# Patient Record
Sex: MALE | Race: BLACK OR AFRICAN AMERICAN | Employment: OTHER | URBAN - METROPOLITAN AREA
[De-identification: names, ages, dates, MRNs, and addresses within clinical notes are randomized per-mention and may not be internally consistent; named-entity substitution may affect disease eponyms.]

---

## 2017-01-01 ENCOUNTER — HOSPITAL ENCOUNTER (OUTPATIENT)
Dept: INFUSION THERAPY | Age: 77
Discharge: HOME | End: 2017-09-08
Payer: MEDICARE

## 2017-01-01 ENCOUNTER — HOSPITAL ENCOUNTER (OUTPATIENT)
Dept: CT IMAGING | Age: 77
Discharge: HOME | End: 2017-07-31
Attending: INTERNAL MEDICINE
Payer: MEDICARE

## 2017-01-01 ENCOUNTER — HOSPITAL ENCOUNTER (OUTPATIENT)
Dept: INFUSION THERAPY | Age: 77
Discharge: HOME | End: 2017-06-16
Payer: MEDICARE

## 2017-01-01 ENCOUNTER — HOSPITAL ENCOUNTER (OUTPATIENT)
Dept: INFUSION THERAPY | Age: 77
Discharge: HOME | End: 2017-10-06
Payer: MEDICARE

## 2017-01-01 ENCOUNTER — TELEPHONE (OUTPATIENT)
Dept: CARDIOLOGY CLINIC | Age: 77
End: 2017-01-01

## 2017-01-01 ENCOUNTER — HOSPITAL ENCOUNTER (OUTPATIENT)
Dept: INFUSION THERAPY | Age: 77
Discharge: HOME | End: 2017-09-01
Payer: MEDICARE

## 2017-01-01 ENCOUNTER — CLINICAL SUPPORT (OUTPATIENT)
Dept: CARDIOLOGY CLINIC | Age: 77
End: 2017-01-01

## 2017-01-01 ENCOUNTER — HOSPITAL ENCOUNTER (OUTPATIENT)
Dept: INFUSION THERAPY | Age: 77
Discharge: HOME | End: 2017-08-25
Payer: MEDICARE

## 2017-01-01 ENCOUNTER — HOSPITAL ENCOUNTER (EMERGENCY)
Age: 77
Discharge: HOME | End: 2017-05-22
Attending: EMERGENCY MEDICINE | Admitting: EMERGENCY MEDICINE
Payer: MEDICARE

## 2017-01-01 ENCOUNTER — HOSPITAL ENCOUNTER (OUTPATIENT)
Dept: INFUSION THERAPY | Age: 77
End: 2017-01-01
Payer: MEDICARE

## 2017-01-01 ENCOUNTER — HOSPITAL ENCOUNTER (OUTPATIENT)
Dept: INFUSION THERAPY | Age: 77
Discharge: HOME | End: 2017-08-18
Payer: MEDICARE

## 2017-01-01 ENCOUNTER — HOSPITAL ENCOUNTER (OUTPATIENT)
Dept: INFUSION THERAPY | Age: 77
Discharge: HOME | End: 2017-10-20
Payer: MEDICARE

## 2017-01-01 ENCOUNTER — HOSPITAL ENCOUNTER (OUTPATIENT)
Dept: GENERAL RADIOLOGY | Age: 77
Discharge: HOME | End: 2017-05-17
Payer: MEDICARE

## 2017-01-01 ENCOUNTER — OFFICE VISIT (OUTPATIENT)
Dept: CARDIOLOGY CLINIC | Age: 77
End: 2017-01-01

## 2017-01-01 ENCOUNTER — HOSPITAL ENCOUNTER (OUTPATIENT)
Dept: CT IMAGING | Age: 77
Discharge: HOME | End: 2017-11-01
Attending: INTERNAL MEDICINE
Payer: MEDICARE

## 2017-01-01 ENCOUNTER — APPOINTMENT (OUTPATIENT)
Dept: INFUSION THERAPY | Age: 77
End: 2017-01-01
Payer: MEDICARE

## 2017-01-01 ENCOUNTER — PATIENT OUTREACH (OUTPATIENT)
Dept: CARDIOLOGY CLINIC | Age: 77
End: 2017-01-01

## 2017-01-01 ENCOUNTER — HOSPITAL ENCOUNTER (OUTPATIENT)
Dept: INFUSION THERAPY | Age: 77
Discharge: HOME | End: 2017-07-28
Payer: MEDICARE

## 2017-01-01 ENCOUNTER — HOSPITAL ENCOUNTER (OUTPATIENT)
Dept: INFUSION THERAPY | Age: 77
Discharge: HOME | End: 2017-10-27
Payer: MEDICARE

## 2017-01-01 ENCOUNTER — HOSPITAL ENCOUNTER (OUTPATIENT)
Dept: ULTRASOUND IMAGING | Age: 77
Discharge: HOME | End: 2017-01-04
Attending: INTERNAL MEDICINE

## 2017-01-01 ENCOUNTER — APPOINTMENT (OUTPATIENT)
Dept: GENERAL RADIOLOGY | Age: 77
DRG: 291 | End: 2017-01-01
Attending: EMERGENCY MEDICINE
Payer: MEDICARE

## 2017-01-01 ENCOUNTER — HOSPITAL ENCOUNTER (OUTPATIENT)
Dept: INFUSION THERAPY | Age: 77
Discharge: HOME | End: 2017-08-11
Payer: MEDICARE

## 2017-01-01 ENCOUNTER — APPOINTMENT (OUTPATIENT)
Dept: GENERAL RADIOLOGY | Age: 77
End: 2017-01-01
Attending: EMERGENCY MEDICINE
Payer: MEDICARE

## 2017-01-01 ENCOUNTER — HOSPITAL ENCOUNTER (EMERGENCY)
Age: 77
Discharge: HOME | End: 2017-05-11
Attending: EMERGENCY MEDICINE
Payer: MEDICARE

## 2017-01-01 ENCOUNTER — HOSPITAL ENCOUNTER (OUTPATIENT)
Dept: INFUSION THERAPY | Age: 77
Discharge: HOME | End: 2017-09-29
Payer: MEDICARE

## 2017-01-01 ENCOUNTER — HOSPITAL ENCOUNTER (OUTPATIENT)
Dept: INFUSION THERAPY | Age: 77
Discharge: HOME | End: 2017-06-30
Payer: MEDICARE

## 2017-01-01 ENCOUNTER — APPOINTMENT (OUTPATIENT)
Dept: CT IMAGING | Age: 77
End: 2017-01-01
Attending: EMERGENCY MEDICINE
Payer: MEDICARE

## 2017-01-01 ENCOUNTER — HOSPITAL ENCOUNTER (OUTPATIENT)
Dept: CT IMAGING | Age: 77
Discharge: HOME | End: 2017-01-18
Attending: INTERNAL MEDICINE
Payer: MEDICARE

## 2017-01-01 ENCOUNTER — HOSPITAL ENCOUNTER (OUTPATIENT)
Dept: GENERAL RADIOLOGY | Age: 77
Discharge: HOME | End: 2017-10-27
Payer: MEDICARE

## 2017-01-01 ENCOUNTER — HOSPITAL ENCOUNTER (OUTPATIENT)
Dept: INFUSION THERAPY | Age: 77
Discharge: HOME | End: 2017-11-03
Payer: MEDICARE

## 2017-01-01 ENCOUNTER — HOSPITAL ENCOUNTER (INPATIENT)
Age: 77
LOS: 7 days | Discharge: HOSPICE | DRG: 291 | End: 2017-11-13
Attending: EMERGENCY MEDICINE | Admitting: INTERNAL MEDICINE
Payer: MEDICARE

## 2017-01-01 ENCOUNTER — HOSPITAL ENCOUNTER (OUTPATIENT)
Dept: INFUSION THERAPY | Age: 77
Discharge: HOME | End: 2017-09-15
Payer: MEDICARE

## 2017-01-01 ENCOUNTER — HOSPITAL ENCOUNTER (OUTPATIENT)
Dept: INFUSION THERAPY | Age: 77
Discharge: HOME | End: 2017-10-13
Payer: MEDICARE

## 2017-01-01 ENCOUNTER — ANESTHESIA (OUTPATIENT)
Dept: SURGERY | Age: 77
End: 2017-01-01
Payer: MEDICARE

## 2017-01-01 ENCOUNTER — HOSPITAL ENCOUNTER (OUTPATIENT)
Age: 77
Setting detail: OUTPATIENT SURGERY
Discharge: HOME | End: 2017-08-28
Attending: INTERNAL MEDICINE | Admitting: INTERNAL MEDICINE
Payer: MEDICARE

## 2017-01-01 ENCOUNTER — HOSPITAL ENCOUNTER (OUTPATIENT)
Dept: INFUSION THERAPY | Age: 77
Discharge: HOME | End: 2017-09-22
Payer: MEDICARE

## 2017-01-01 ENCOUNTER — HOSPITAL ENCOUNTER (EMERGENCY)
Age: 77
Discharge: HOME | End: 2017-03-21
Attending: EMERGENCY MEDICINE | Admitting: EMERGENCY MEDICINE
Payer: MEDICARE

## 2017-01-01 ENCOUNTER — HOSPITAL ENCOUNTER (OUTPATIENT)
Dept: INFUSION THERAPY | Age: 77
Discharge: HOME | End: 2017-07-14
Payer: MEDICARE

## 2017-01-01 ENCOUNTER — ANESTHESIA EVENT (OUTPATIENT)
Dept: SURGERY | Age: 77
End: 2017-01-01
Payer: MEDICARE

## 2017-01-01 VITALS
HEIGHT: 69 IN | DIASTOLIC BLOOD PRESSURE: 31 MMHG | TEMPERATURE: 97.8 F | WEIGHT: 208 LBS | OXYGEN SATURATION: 95 % | BODY MASS INDEX: 30.81 KG/M2 | HEART RATE: 71 BPM | RESPIRATION RATE: 16 BRPM | SYSTOLIC BLOOD PRESSURE: 100 MMHG

## 2017-01-01 VITALS
RESPIRATION RATE: 18 BRPM | HEIGHT: 68 IN | BODY MASS INDEX: 33.18 KG/M2 | WEIGHT: 207.6 LBS | SYSTOLIC BLOOD PRESSURE: 106 MMHG | DIASTOLIC BLOOD PRESSURE: 62 MMHG | WEIGHT: 218.9 LBS | HEART RATE: 69 BPM | TEMPERATURE: 97.2 F | RESPIRATION RATE: 18 BRPM | TEMPERATURE: 97 F | SYSTOLIC BLOOD PRESSURE: 110 MMHG | BODY MASS INDEX: 31.57 KG/M2 | OXYGEN SATURATION: 98 % | HEART RATE: 68 BPM | DIASTOLIC BLOOD PRESSURE: 62 MMHG

## 2017-01-01 VITALS
SYSTOLIC BLOOD PRESSURE: 123 MMHG | DIASTOLIC BLOOD PRESSURE: 65 MMHG | TEMPERATURE: 97.8 F | WEIGHT: 208 LBS | HEIGHT: 69 IN | OXYGEN SATURATION: 99 % | HEART RATE: 70 BPM | RESPIRATION RATE: 18 BRPM | BODY MASS INDEX: 30.81 KG/M2

## 2017-01-01 VITALS
DIASTOLIC BLOOD PRESSURE: 64 MMHG | BODY MASS INDEX: 30.81 KG/M2 | RESPIRATION RATE: 16 BRPM | SYSTOLIC BLOOD PRESSURE: 102 MMHG | HEIGHT: 69 IN | HEART RATE: 70 BPM | WEIGHT: 208 LBS | OXYGEN SATURATION: 98 %

## 2017-01-01 VITALS
SYSTOLIC BLOOD PRESSURE: 85 MMHG | HEART RATE: 71 BPM | OXYGEN SATURATION: 98 % | DIASTOLIC BLOOD PRESSURE: 52 MMHG | TEMPERATURE: 97.6 F | RESPIRATION RATE: 18 BRPM

## 2017-01-01 VITALS
WEIGHT: 223.5 LBS | SYSTOLIC BLOOD PRESSURE: 116 MMHG | RESPIRATION RATE: 16 BRPM | HEIGHT: 68 IN | OXYGEN SATURATION: 96 % | BODY MASS INDEX: 33.87 KG/M2 | DIASTOLIC BLOOD PRESSURE: 60 MMHG | HEART RATE: 70 BPM

## 2017-01-01 VITALS
HEART RATE: 68 BPM | DIASTOLIC BLOOD PRESSURE: 57 MMHG | SYSTOLIC BLOOD PRESSURE: 107 MMHG | TEMPERATURE: 97.5 F | RESPIRATION RATE: 18 BRPM

## 2017-01-01 VITALS
WEIGHT: 207.4 LBS | TEMPERATURE: 97.5 F | DIASTOLIC BLOOD PRESSURE: 60 MMHG | HEART RATE: 71 BPM | BODY MASS INDEX: 31.43 KG/M2 | SYSTOLIC BLOOD PRESSURE: 109 MMHG | HEIGHT: 68 IN | RESPIRATION RATE: 18 BRPM | OXYGEN SATURATION: 100 %

## 2017-01-01 VITALS
HEART RATE: 70 BPM | SYSTOLIC BLOOD PRESSURE: 111 MMHG | RESPIRATION RATE: 18 BRPM | DIASTOLIC BLOOD PRESSURE: 66 MMHG | TEMPERATURE: 97.9 F

## 2017-01-01 VITALS
TEMPERATURE: 97.6 F | OXYGEN SATURATION: 99 % | RESPIRATION RATE: 22 BRPM | HEART RATE: 70 BPM | DIASTOLIC BLOOD PRESSURE: 60 MMHG | SYSTOLIC BLOOD PRESSURE: 100 MMHG

## 2017-01-01 VITALS
DIASTOLIC BLOOD PRESSURE: 62 MMHG | OXYGEN SATURATION: 100 % | HEART RATE: 70 BPM | SYSTOLIC BLOOD PRESSURE: 119 MMHG | RESPIRATION RATE: 18 BRPM | TEMPERATURE: 98.1 F

## 2017-01-01 VITALS
SYSTOLIC BLOOD PRESSURE: 103 MMHG | HEIGHT: 68 IN | WEIGHT: 222.06 LBS | DIASTOLIC BLOOD PRESSURE: 61 MMHG | OXYGEN SATURATION: 99 % | TEMPERATURE: 97.5 F | BODY MASS INDEX: 33.65 KG/M2 | HEART RATE: 70 BPM | RESPIRATION RATE: 18 BRPM

## 2017-01-01 VITALS
DIASTOLIC BLOOD PRESSURE: 54 MMHG | RESPIRATION RATE: 18 BRPM | HEART RATE: 69 BPM | OXYGEN SATURATION: 99 % | SYSTOLIC BLOOD PRESSURE: 90 MMHG | TEMPERATURE: 97.2 F

## 2017-01-01 VITALS
OXYGEN SATURATION: 98 % | SYSTOLIC BLOOD PRESSURE: 107 MMHG | HEART RATE: 70 BPM | RESPIRATION RATE: 18 BRPM | DIASTOLIC BLOOD PRESSURE: 61 MMHG | TEMPERATURE: 97.5 F | WEIGHT: 204.56 LBS | BODY MASS INDEX: 31 KG/M2 | HEIGHT: 68 IN

## 2017-01-01 VITALS
WEIGHT: 207 LBS | HEART RATE: 70 BPM | RESPIRATION RATE: 19 BRPM | HEIGHT: 69 IN | BODY MASS INDEX: 30.66 KG/M2 | DIASTOLIC BLOOD PRESSURE: 68 MMHG | OXYGEN SATURATION: 98 % | SYSTOLIC BLOOD PRESSURE: 108 MMHG

## 2017-01-01 VITALS
DIASTOLIC BLOOD PRESSURE: 74 MMHG | BODY MASS INDEX: 30.51 KG/M2 | RESPIRATION RATE: 16 BRPM | WEIGHT: 206 LBS | HEIGHT: 69 IN | SYSTOLIC BLOOD PRESSURE: 126 MMHG | HEART RATE: 73 BPM | OXYGEN SATURATION: 100 %

## 2017-01-01 VITALS
OXYGEN SATURATION: 99 % | BODY MASS INDEX: 31.64 KG/M2 | TEMPERATURE: 98 F | DIASTOLIC BLOOD PRESSURE: 53 MMHG | RESPIRATION RATE: 18 BRPM | HEART RATE: 70 BPM | SYSTOLIC BLOOD PRESSURE: 104 MMHG | HEIGHT: 69 IN | WEIGHT: 213.63 LBS

## 2017-01-01 VITALS
BODY MASS INDEX: 34.55 KG/M2 | SYSTOLIC BLOOD PRESSURE: 111 MMHG | OXYGEN SATURATION: 97 % | RESPIRATION RATE: 20 BRPM | HEART RATE: 78 BPM | WEIGHT: 227.2 LBS | DIASTOLIC BLOOD PRESSURE: 63 MMHG | TEMPERATURE: 97.5 F

## 2017-01-01 VITALS — BODY MASS INDEX: 33.82 KG/M2 | WEIGHT: 229 LBS

## 2017-01-01 VITALS
DIASTOLIC BLOOD PRESSURE: 64 MMHG | OXYGEN SATURATION: 99 % | BODY MASS INDEX: 31.77 KG/M2 | TEMPERATURE: 98.3 F | HEART RATE: 70 BPM | RESPIRATION RATE: 18 BRPM | SYSTOLIC BLOOD PRESSURE: 109 MMHG | WEIGHT: 209.6 LBS | HEIGHT: 68 IN

## 2017-01-01 VITALS
OXYGEN SATURATION: 97 % | HEART RATE: 70 BPM | SYSTOLIC BLOOD PRESSURE: 112 MMHG | RESPIRATION RATE: 20 BRPM | DIASTOLIC BLOOD PRESSURE: 62 MMHG | TEMPERATURE: 97.4 F

## 2017-01-01 VITALS
WEIGHT: 205.3 LBS | RESPIRATION RATE: 20 BRPM | BODY MASS INDEX: 30.41 KG/M2 | HEIGHT: 69 IN | HEART RATE: 70 BPM | SYSTOLIC BLOOD PRESSURE: 110 MMHG | OXYGEN SATURATION: 97 % | DIASTOLIC BLOOD PRESSURE: 58 MMHG

## 2017-01-01 VITALS
HEIGHT: 68 IN | WEIGHT: 229.28 LBS | BODY MASS INDEX: 34.75 KG/M2 | DIASTOLIC BLOOD PRESSURE: 80 MMHG | SYSTOLIC BLOOD PRESSURE: 129 MMHG | HEART RATE: 70 BPM | RESPIRATION RATE: 19 BRPM | OXYGEN SATURATION: 97 %

## 2017-01-01 VITALS
OXYGEN SATURATION: 96 % | DIASTOLIC BLOOD PRESSURE: 58 MMHG | SYSTOLIC BLOOD PRESSURE: 116 MMHG | TEMPERATURE: 96.9 F | HEART RATE: 71 BPM | RESPIRATION RATE: 20 BRPM

## 2017-01-01 VITALS
RESPIRATION RATE: 16 BRPM | DIASTOLIC BLOOD PRESSURE: 60 MMHG | OXYGEN SATURATION: 98 % | HEIGHT: 68 IN | HEART RATE: 71 BPM | SYSTOLIC BLOOD PRESSURE: 112 MMHG | BODY MASS INDEX: 31.49 KG/M2 | WEIGHT: 207.8 LBS

## 2017-01-01 VITALS
DIASTOLIC BLOOD PRESSURE: 62 MMHG | SYSTOLIC BLOOD PRESSURE: 104 MMHG | TEMPERATURE: 97.8 F | HEART RATE: 70 BPM | RESPIRATION RATE: 18 BRPM

## 2017-01-01 VITALS
DIASTOLIC BLOOD PRESSURE: 58 MMHG | RESPIRATION RATE: 18 BRPM | TEMPERATURE: 97.7 F | OXYGEN SATURATION: 97 % | HEART RATE: 70 BPM | SYSTOLIC BLOOD PRESSURE: 95 MMHG

## 2017-01-01 DIAGNOSIS — I48.92 ATRIAL FLUTTER, UNSPECIFIED TYPE (HCC): ICD-10-CM

## 2017-01-01 DIAGNOSIS — I50.22 CHRONIC SYSTOLIC HEART FAILURE (HCC): Primary | ICD-10-CM

## 2017-01-01 DIAGNOSIS — R19.04 ABDOMINAL SWELLING, LEFT LOWER QUADRANT: ICD-10-CM

## 2017-01-01 DIAGNOSIS — I50.22 CHRONIC SYSTOLIC HEART FAILURE (HCC): ICD-10-CM

## 2017-01-01 DIAGNOSIS — Z95.1 POSTSURGICAL AORTOCORONARY BYPASS STATUS: ICD-10-CM

## 2017-01-01 DIAGNOSIS — C79.52 SECONDARY MALIGNANT NEOPLASM OF BONE AND BONE MARROW (HCC): ICD-10-CM

## 2017-01-01 DIAGNOSIS — R19.04 ABDOMINAL OR PELVIC SWELLING, MASS, OR LUMP, LEFT LOWER QUADRANT: ICD-10-CM

## 2017-01-01 DIAGNOSIS — Z95.810 PRESENCE OF BIVENTRICULAR AUTOMATIC CARDIOVERTER/DEFIBRILLATOR (AICD): Primary | ICD-10-CM

## 2017-01-01 DIAGNOSIS — E16.2 HYPOGLYCEMIA: Primary | ICD-10-CM

## 2017-01-01 DIAGNOSIS — R53.81 DEBILITY: ICD-10-CM

## 2017-01-01 DIAGNOSIS — E11.8 TYPE 2 DIABETES MELLITUS WITH COMPLICATION, UNSPECIFIED LONG TERM INSULIN USE STATUS: ICD-10-CM

## 2017-01-01 DIAGNOSIS — R22.2 SWELLING, MASS, OR LUMP IN CHEST: ICD-10-CM

## 2017-01-01 DIAGNOSIS — E86.0 DEHYDRATION: ICD-10-CM

## 2017-01-01 DIAGNOSIS — R06.09 DOE (DYSPNEA ON EXERTION): ICD-10-CM

## 2017-01-01 DIAGNOSIS — C79.9 METASTATIC CANCER (HCC): ICD-10-CM

## 2017-01-01 DIAGNOSIS — C77.5 SECONDARY AND UNSPECIFIED MALIGNANT NEOPLASM OF INTRAPELVIC LYMPH NODES (HCC): ICD-10-CM

## 2017-01-01 DIAGNOSIS — I10 ESSENTIAL HYPERTENSION, BENIGN: ICD-10-CM

## 2017-01-01 DIAGNOSIS — C79.51 SECONDARY MALIGNANT NEOPLASM OF BONE AND BONE MARROW (HCC): ICD-10-CM

## 2017-01-01 DIAGNOSIS — R22.2 MASS IN CHEST: ICD-10-CM

## 2017-01-01 DIAGNOSIS — C65.9 MALIGNANT NEOPLASM OF RENAL PELVIS (HCC): ICD-10-CM

## 2017-01-01 DIAGNOSIS — R06.02 SHORTNESS OF BREATH: ICD-10-CM

## 2017-01-01 DIAGNOSIS — E78.2 MIXED HYPERLIPIDEMIA: ICD-10-CM

## 2017-01-01 DIAGNOSIS — Z98.890 S/P ABLATION OF ATRIAL FIBRILLATION: ICD-10-CM

## 2017-01-01 DIAGNOSIS — Z95.810 PRESENCE OF BIVENTRICULAR AUTOMATIC CARDIOVERTER/DEFIBRILLATOR (AICD): ICD-10-CM

## 2017-01-01 DIAGNOSIS — Z71.89 COUNSELING REGARDING ADVANCED CARE PLANNING AND GOALS OF CARE: ICD-10-CM

## 2017-01-01 DIAGNOSIS — R06.02 SOB (SHORTNESS OF BREATH): ICD-10-CM

## 2017-01-01 DIAGNOSIS — Z86.79 S/P ABLATION OF ATRIAL FIBRILLATION: ICD-10-CM

## 2017-01-01 DIAGNOSIS — I50.22 CHRONIC SYSTOLIC HEART FAILURE (HCC): Primary | Chronic | ICD-10-CM

## 2017-01-01 DIAGNOSIS — I48.0 PAROXYSMAL ATRIAL FIBRILLATION (HCC): ICD-10-CM

## 2017-01-01 DIAGNOSIS — E87.70 HYPERVOLEMIA, UNSPECIFIED HYPERVOLEMIA TYPE: ICD-10-CM

## 2017-01-01 DIAGNOSIS — I50.22 CHRONIC SYSTOLIC HEART FAILURE (HCC): Chronic | ICD-10-CM

## 2017-01-01 DIAGNOSIS — R06.02 SHORTNESS OF BREATH: Primary | ICD-10-CM

## 2017-01-01 DIAGNOSIS — D50.9 NORMOCYTIC HYPOCHROMIC ANEMIA: ICD-10-CM

## 2017-01-01 DIAGNOSIS — N18.4 CHRONIC KIDNEY DISEASE, STAGE IV (SEVERE) (HCC): ICD-10-CM

## 2017-01-01 DIAGNOSIS — D63.0 ANEMIA IN NEOPLASTIC DISEASE: ICD-10-CM

## 2017-01-01 DIAGNOSIS — I48.91 ATRIAL FIBRILLATION, UNSPECIFIED TYPE (HCC): ICD-10-CM

## 2017-01-01 DIAGNOSIS — I48.3 TYPICAL ATRIAL FLUTTER (HCC): ICD-10-CM

## 2017-01-01 DIAGNOSIS — I25.10 ATHEROSCLEROSIS OF NATIVE CORONARY ARTERY OF NATIVE HEART WITHOUT ANGINA PECTORIS: ICD-10-CM

## 2017-01-01 DIAGNOSIS — E88.09 HYPOALBUMINEMIA: ICD-10-CM

## 2017-01-01 DIAGNOSIS — R05.9 COUGH: ICD-10-CM

## 2017-01-01 LAB
ALBUMIN SERPL BCP-MCNC: 3 G/DL (ref 3.5–5)
ALBUMIN SERPL BCP-MCNC: 3.1 G/DL (ref 3.5–5)
ALBUMIN SERPL BCP-MCNC: 3.2 G/DL (ref 3.5–5)
ALBUMIN SERPL BCP-MCNC: 3.2 G/DL (ref 3.5–5)
ALBUMIN SERPL BCP-MCNC: 3.3 G/DL (ref 3.5–5)
ALBUMIN SERPL-MCNC: 1.8 G/DL (ref 3.5–5)
ALBUMIN SERPL-MCNC: 2.2 G/DL (ref 3.5–5)
ALBUMIN SERPL-MCNC: 2.3 G/DL (ref 3.5–5)
ALBUMIN SERPL-MCNC: 2.6 G/DL (ref 3.5–5)
ALBUMIN SERPL-MCNC: 2.8 G/DL (ref 3.5–5)
ALBUMIN SERPL-MCNC: 3 G/DL (ref 3.5–5)
ALBUMIN SERPL-MCNC: 3.5 G/DL (ref 3.5–4.8)
ALBUMIN/GLOB SERPL: 0.5 {RATIO} (ref 1.1–2.2)
ALBUMIN/GLOB SERPL: 0.5 {RATIO} (ref 1.1–2.2)
ALBUMIN/GLOB SERPL: 0.6 {RATIO} (ref 1.1–2.2)
ALBUMIN/GLOB SERPL: 0.7 {RATIO} (ref 1.1–2.2)
ALBUMIN/GLOB SERPL: 0.8 {RATIO} (ref 1.1–2.2)
ALBUMIN/GLOB SERPL: 1.1 {RATIO} (ref 1.2–2.2)
ALP SERPL-CCNC: 153 U/L (ref 45–117)
ALP SERPL-CCNC: 175 U/L (ref 45–117)
ALP SERPL-CCNC: 178 IU/L (ref 39–117)
ALP SERPL-CCNC: 182 U/L (ref 45–117)
ALP SERPL-CCNC: 188 U/L (ref 45–117)
ALP SERPL-CCNC: 191 U/L (ref 45–117)
ALP SERPL-CCNC: 195 U/L (ref 45–117)
ALP SERPL-CCNC: 210 U/L (ref 45–117)
ALP SERPL-CCNC: 224 U/L (ref 45–117)
ALP SERPL-CCNC: 243 U/L (ref 45–117)
ALP SERPL-CCNC: 261 U/L (ref 45–117)
ALP SERPL-CCNC: 292 U/L (ref 45–117)
ALP SERPL-CCNC: 304 U/L (ref 45–117)
ALP SERPL-CCNC: 372 U/L (ref 45–117)
ALT SERPL-CCNC: 10 U/L (ref 12–78)
ALT SERPL-CCNC: 11 IU/L (ref 0–44)
ALT SERPL-CCNC: 11 U/L (ref 12–78)
ALT SERPL-CCNC: 12 U/L (ref 12–78)
ALT SERPL-CCNC: 13 U/L (ref 12–78)
ALT SERPL-CCNC: 14 U/L (ref 12–78)
ALT SERPL-CCNC: 14 U/L (ref 12–78)
ALT SERPL-CCNC: 16 U/L (ref 12–78)
ALT SERPL-CCNC: 19 U/L (ref 12–78)
ALT SERPL-CCNC: 28 U/L (ref 12–78)
ALT SERPL-CCNC: 9 U/L (ref 12–78)
ALT SERPL-CCNC: 9 U/L (ref 12–78)
ANION GAP BLD CALC-SCNC: 7 MMOL/L (ref 5–15)
ANION GAP BLD CALC-SCNC: 8 MMOL/L (ref 5–15)
ANION GAP BLD CALC-SCNC: 9 MMOL/L (ref 5–15)
ANION GAP SERPL CALC-SCNC: 10 MMOL/L (ref 5–15)
ANION GAP SERPL CALC-SCNC: 11 MMOL/L (ref 5–15)
ANION GAP SERPL CALC-SCNC: 6 MMOL/L (ref 5–15)
ANION GAP SERPL CALC-SCNC: 7 MMOL/L (ref 5–15)
ANION GAP SERPL CALC-SCNC: 8 MMOL/L (ref 5–15)
ANION GAP SERPL CALC-SCNC: 9 MMOL/L (ref 5–15)
APPEARANCE UR: ABNORMAL
APPEARANCE UR: CLEAR
AST SERPL W P-5'-P-CCNC: 11 U/L (ref 15–37)
AST SERPL W P-5'-P-CCNC: 14 U/L (ref 15–37)
AST SERPL W P-5'-P-CCNC: 15 U/L (ref 15–37)
AST SERPL W P-5'-P-CCNC: 15 U/L (ref 15–37)
AST SERPL W P-5'-P-CCNC: 16 U/L (ref 15–37)
AST SERPL W P-5'-P-CCNC: 16 U/L (ref 15–37)
AST SERPL W P-5'-P-CCNC: 20 U/L (ref 15–37)
AST SERPL-CCNC: 10 U/L (ref 15–37)
AST SERPL-CCNC: 11 U/L (ref 15–37)
AST SERPL-CCNC: 15 IU/L (ref 0–40)
AST SERPL-CCNC: 20 U/L (ref 15–37)
AST SERPL-CCNC: 23 U/L (ref 15–37)
ATRIAL RATE: 59 BPM
ATRIAL RATE: 90 BPM
BACTERIA URNS QL MICRO: NEGATIVE /HPF
BACTERIA URNS QL MICRO: NEGATIVE /HPF
BASO+EOS+MONOS # BLD AUTO: 0.9 K/UL (ref 0.2–1.2)
BASO+EOS+MONOS # BLD AUTO: 1.1 K/UL (ref 0.2–1.2)
BASO+EOS+MONOS # BLD AUTO: 1.1 K/UL (ref 0.2–1.2)
BASO+EOS+MONOS # BLD AUTO: 1.3 K/UL (ref 0.2–1.2)
BASO+EOS+MONOS # BLD AUTO: 15 % (ref 3.2–16.9)
BASO+EOS+MONOS # BLD AUTO: 5 % (ref 3.2–16.9)
BASO+EOS+MONOS # BLD AUTO: 5 % (ref 3.2–16.9)
BASO+EOS+MONOS # BLD AUTO: 9 % (ref 3.2–16.9)
BASOPHILS # BLD AUTO: 0 K/UL (ref 0–0.1)
BASOPHILS # BLD: 0 % (ref 0–1)
BASOPHILS # BLD: 0 K/UL (ref 0–0.1)
BASOPHILS NFR BLD: 0 % (ref 0–1)
BILIRUB SERPL-MCNC: 1.4 MG/DL (ref 0.2–1)
BILIRUB SERPL-MCNC: 1.5 MG/DL (ref 0.2–1)
BILIRUB SERPL-MCNC: 1.6 MG/DL (ref 0–1.2)
BILIRUB SERPL-MCNC: 1.7 MG/DL (ref 0.2–1)
BILIRUB SERPL-MCNC: 1.7 MG/DL (ref 0.2–1)
BILIRUB SERPL-MCNC: 1.8 MG/DL (ref 0.2–1)
BILIRUB SERPL-MCNC: 1.9 MG/DL (ref 0.2–1)
BILIRUB SERPL-MCNC: 1.9 MG/DL (ref 0.2–1)
BILIRUB SERPL-MCNC: 2.1 MG/DL (ref 0.2–1)
BILIRUB SERPL-MCNC: 2.1 MG/DL (ref 0.2–1)
BILIRUB SERPL-MCNC: 2.2 MG/DL (ref 0.2–1)
BILIRUB SERPL-MCNC: 2.4 MG/DL (ref 0.2–1)
BILIRUB SERPL-MCNC: 2.6 MG/DL (ref 0.2–1)
BILIRUB SERPL-MCNC: 2.7 MG/DL (ref 0.2–1)
BILIRUB UR QL CFM: NEGATIVE
BILIRUB UR QL: NEGATIVE
BNP SERPL-MCNC: 6973 PG/ML (ref 0–125)
BNP SERPL-MCNC: ABNORMAL PG/ML (ref 0–450)
BUN SERPL-MCNC: 15 MG/DL (ref 6–20)
BUN SERPL-MCNC: 18 MG/DL (ref 6–20)
BUN SERPL-MCNC: 18 MG/DL (ref 8–27)
BUN SERPL-MCNC: 21 MG/DL (ref 6–20)
BUN SERPL-MCNC: 23 MG/DL (ref 6–20)
BUN SERPL-MCNC: 25 MG/DL (ref 6–20)
BUN SERPL-MCNC: 28 MG/DL (ref 6–20)
BUN SERPL-MCNC: 30 MG/DL (ref 8–27)
BUN SERPL-MCNC: 33 MG/DL (ref 6–20)
BUN SERPL-MCNC: 33 MG/DL (ref 6–20)
BUN SERPL-MCNC: 34 MG/DL (ref 6–20)
BUN SERPL-MCNC: 38 MG/DL (ref 6–20)
BUN SERPL-MCNC: 44 MG/DL (ref 6–20)
BUN SERPL-MCNC: 44 MG/DL (ref 6–20)
BUN SERPL-MCNC: 47 MG/DL (ref 6–20)
BUN SERPL-MCNC: 49 MG/DL (ref 6–20)
BUN SERPL-MCNC: 49 MG/DL (ref 6–20)
BUN SERPL-MCNC: 52 MG/DL (ref 6–20)
BUN SERPL-MCNC: 54 MG/DL (ref 6–20)
BUN SERPL-MCNC: 54 MG/DL (ref 6–20)
BUN SERPL-MCNC: 56 MG/DL (ref 6–20)
BUN/CREAT SERPL: 10 (ref 10–24)
BUN/CREAT SERPL: 10 (ref 12–20)
BUN/CREAT SERPL: 10 (ref 12–20)
BUN/CREAT SERPL: 12 (ref 12–20)
BUN/CREAT SERPL: 13 (ref 12–20)
BUN/CREAT SERPL: 14 (ref 12–20)
BUN/CREAT SERPL: 15 (ref 12–20)
BUN/CREAT SERPL: 16 (ref 10–24)
BUN/CREAT SERPL: 19 (ref 12–20)
BUN/CREAT SERPL: 23 (ref 12–20)
BUN/CREAT SERPL: 25 (ref 12–20)
BUN/CREAT SERPL: 25 (ref 12–20)
BUN/CREAT SERPL: 26 (ref 12–20)
BUN/CREAT SERPL: 27 (ref 12–20)
BUN/CREAT SERPL: 9 (ref 12–20)
CALCIUM SERPL-MCNC: 8 MG/DL (ref 8.5–10.1)
CALCIUM SERPL-MCNC: 8.2 MG/DL (ref 8.5–10.1)
CALCIUM SERPL-MCNC: 8.3 MG/DL (ref 8.5–10.1)
CALCIUM SERPL-MCNC: 8.4 MG/DL (ref 8.5–10.1)
CALCIUM SERPL-MCNC: 8.4 MG/DL (ref 8.5–10.1)
CALCIUM SERPL-MCNC: 8.5 MG/DL (ref 8.5–10.1)
CALCIUM SERPL-MCNC: 8.5 MG/DL (ref 8.5–10.1)
CALCIUM SERPL-MCNC: 8.6 MG/DL (ref 8.5–10.1)
CALCIUM SERPL-MCNC: 8.7 MG/DL (ref 8.5–10.1)
CALCIUM SERPL-MCNC: 8.7 MG/DL (ref 8.5–10.1)
CALCIUM SERPL-MCNC: 8.8 MG/DL (ref 8.5–10.1)
CALCIUM SERPL-MCNC: 8.9 MG/DL (ref 8.5–10.1)
CALCIUM SERPL-MCNC: 9 MG/DL (ref 8.5–10.1)
CALCIUM SERPL-MCNC: 9.2 MG/DL (ref 8.5–10.1)
CALCIUM SERPL-MCNC: 9.4 MG/DL (ref 8.5–10.1)
CALCIUM SERPL-MCNC: 9.4 MG/DL (ref 8.6–10.2)
CALCIUM SERPL-MCNC: 9.6 MG/DL (ref 8.6–10.2)
CALCULATED R AXIS, ECG10: -110 DEGREES
CALCULATED R AXIS, ECG10: -24 DEGREES
CALCULATED T AXIS, ECG11: 60 DEGREES
CALCULATED T AXIS, ECG11: 63 DEGREES
CANCER AG19-9 SERPL-ACNC: 1436 U/ML (ref 0–35)
CHLORIDE SERPL-SCNC: 100 MMOL/L (ref 97–108)
CHLORIDE SERPL-SCNC: 92 MMOL/L (ref 96–106)
CHLORIDE SERPL-SCNC: 93 MMOL/L (ref 97–108)
CHLORIDE SERPL-SCNC: 94 MMOL/L (ref 96–106)
CHLORIDE SERPL-SCNC: 94 MMOL/L (ref 97–108)
CHLORIDE SERPL-SCNC: 95 MMOL/L (ref 97–108)
CHLORIDE SERPL-SCNC: 96 MMOL/L (ref 97–108)
CHLORIDE SERPL-SCNC: 96 MMOL/L (ref 97–108)
CHLORIDE SERPL-SCNC: 97 MMOL/L (ref 97–108)
CHLORIDE SERPL-SCNC: 98 MMOL/L (ref 97–108)
CHLORIDE SERPL-SCNC: 99 MMOL/L (ref 97–108)
CHOLEST SERPL-MCNC: 74 MG/DL
CK MB CFR SERPL CALC: NORMAL % (ref 0–2.5)
CK MB SERPL-MCNC: <1 NG/ML (ref 5–25)
CK SERPL-CCNC: 45 U/L (ref 39–308)
CO2 SERPL-SCNC: 23 MMOL/L (ref 21–32)
CO2 SERPL-SCNC: 24 MMOL/L (ref 18–29)
CO2 SERPL-SCNC: 24 MMOL/L (ref 21–32)
CO2 SERPL-SCNC: 24 MMOL/L (ref 21–32)
CO2 SERPL-SCNC: 26 MMOL/L (ref 21–32)
CO2 SERPL-SCNC: 27 MMOL/L (ref 21–32)
CO2 SERPL-SCNC: 28 MMOL/L (ref 18–29)
CO2 SERPL-SCNC: 28 MMOL/L (ref 21–32)
CO2 SERPL-SCNC: 29 MMOL/L (ref 21–32)
CO2 SERPL-SCNC: 31 MMOL/L (ref 21–32)
CO2 SERPL-SCNC: 31 MMOL/L (ref 21–32)
CO2 SERPL-SCNC: 34 MMOL/L (ref 21–32)
COLOR UR: ABNORMAL
COLOR UR: ABNORMAL
CREAT SERPL-MCNC: 1.64 MG/DL (ref 0.7–1.3)
CREAT SERPL-MCNC: 1.65 MG/DL (ref 0.7–1.3)
CREAT SERPL-MCNC: 1.73 MG/DL (ref 0.7–1.3)
CREAT SERPL-MCNC: 1.8 MG/DL (ref 0.7–1.3)
CREAT SERPL-MCNC: 1.81 MG/DL (ref 0.7–1.3)
CREAT SERPL-MCNC: 1.85 MG/DL (ref 0.76–1.27)
CREAT SERPL-MCNC: 1.88 MG/DL (ref 0.7–1.3)
CREAT SERPL-MCNC: 1.9 MG/DL (ref 0.7–1.3)
CREAT SERPL-MCNC: 1.91 MG/DL (ref 0.76–1.27)
CREAT SERPL-MCNC: 2.02 MG/DL (ref 0.7–1.3)
CREAT SERPL-MCNC: 2.06 MG/DL (ref 0.7–1.3)
CREAT SERPL-MCNC: 2.11 MG/DL (ref 0.7–1.3)
CREAT SERPL-MCNC: 2.22 MG/DL (ref 0.7–1.3)
CREAT SERPL-MCNC: 2.31 MG/DL (ref 0.7–1.3)
CREAT SERPL-MCNC: 2.34 MG/DL (ref 0.7–1.3)
CREAT SERPL-MCNC: 2.34 MG/DL (ref 0.7–1.3)
CREAT SERPL-MCNC: 2.44 MG/DL (ref 0.7–1.3)
CREAT SERPL-MCNC: 2.55 MG/DL (ref 0.7–1.3)
CREAT SERPL-MCNC: 2.63 MG/DL (ref 0.7–1.3)
CREAT SERPL-MCNC: 3.24 MG/DL (ref 0.7–1.3)
CREAT SERPL-MCNC: 3.43 MG/DL (ref 0.7–1.3)
DIAGNOSIS, 93000: NORMAL
DIAGNOSIS, 93000: NORMAL
DIFFERENTIAL METHOD BLD: ABNORMAL
EOSINOPHIL # BLD: 0 K/UL (ref 0–0.4)
EOSINOPHIL # BLD: 0.1 K/UL (ref 0–0.4)
EOSINOPHIL NFR BLD: 0 % (ref 0–7)
EOSINOPHIL NFR BLD: 1 % (ref 0–7)
EPITH CASTS URNS QL MICRO: ABNORMAL /LPF
EPITH CASTS URNS QL MICRO: ABNORMAL /LPF
ERYTHROCYTE [DISTWIDTH] IN BLOOD BY AUTOMATED COUNT: 15.7 % (ref 11.8–15.8)
ERYTHROCYTE [DISTWIDTH] IN BLOOD BY AUTOMATED COUNT: 16.1 % (ref 11.5–14.5)
ERYTHROCYTE [DISTWIDTH] IN BLOOD BY AUTOMATED COUNT: 16.1 % (ref 11.5–14.5)
ERYTHROCYTE [DISTWIDTH] IN BLOOD BY AUTOMATED COUNT: 16.1 % (ref 11.8–15.8)
ERYTHROCYTE [DISTWIDTH] IN BLOOD BY AUTOMATED COUNT: 16.4 % (ref 11.5–14.5)
ERYTHROCYTE [DISTWIDTH] IN BLOOD BY AUTOMATED COUNT: 16.6 % (ref 11.8–15.8)
ERYTHROCYTE [DISTWIDTH] IN BLOOD BY AUTOMATED COUNT: 16.7 % (ref 11.5–14.5)
ERYTHROCYTE [DISTWIDTH] IN BLOOD BY AUTOMATED COUNT: 16.9 % (ref 11.5–14.5)
ERYTHROCYTE [DISTWIDTH] IN BLOOD BY AUTOMATED COUNT: 17.1 % (ref 11.5–14.5)
ERYTHROCYTE [DISTWIDTH] IN BLOOD BY AUTOMATED COUNT: 17.2 % (ref 11.5–14.5)
ERYTHROCYTE [DISTWIDTH] IN BLOOD BY AUTOMATED COUNT: 17.3 % (ref 11.5–14.5)
ERYTHROCYTE [DISTWIDTH] IN BLOOD BY AUTOMATED COUNT: 17.5 % (ref 11.5–14.5)
ERYTHROCYTE [DISTWIDTH] IN BLOOD BY AUTOMATED COUNT: 17.5 % (ref 11.5–14.5)
ERYTHROCYTE [DISTWIDTH] IN BLOOD BY AUTOMATED COUNT: 17.6 % (ref 11.5–14.5)
ERYTHROCYTE [DISTWIDTH] IN BLOOD BY AUTOMATED COUNT: 17.6 % (ref 11.5–14.5)
ERYTHROCYTE [DISTWIDTH] IN BLOOD BY AUTOMATED COUNT: 17.7 % (ref 11.5–14.5)
ERYTHROCYTE [DISTWIDTH] IN BLOOD BY AUTOMATED COUNT: 17.9 % (ref 11.8–15.8)
ERYTHROCYTE [DISTWIDTH] IN BLOOD BY AUTOMATED COUNT: 18 % (ref 11.5–14.5)
ERYTHROCYTE [DISTWIDTH] IN BLOOD BY AUTOMATED COUNT: 18.2 % (ref 11.5–14.5)
ERYTHROCYTE [DISTWIDTH] IN BLOOD BY AUTOMATED COUNT: 18.3 % (ref 11.5–14.5)
FERRITIN SERPL-MCNC: 173 NG/ML (ref 26–388)
FERRITIN SERPL-MCNC: 92 NG/ML (ref 26–388)
FERRITIN SERPL-MCNC: 98 NG/ML (ref 26–388)
GLOBULIN SER CALC-MCNC: 3.3 G/DL (ref 1.5–4.5)
GLOBULIN SER CALC-MCNC: 3.5 G/DL (ref 2–4)
GLOBULIN SER CALC-MCNC: 3.6 G/DL (ref 2–4)
GLOBULIN SER CALC-MCNC: 3.7 G/DL (ref 2–4)
GLOBULIN SER CALC-MCNC: 3.9 G/DL (ref 2–4)
GLOBULIN SER CALC-MCNC: 4 G/DL (ref 2–4)
GLOBULIN SER CALC-MCNC: 4 G/DL (ref 2–4)
GLOBULIN SER CALC-MCNC: 4.1 G/DL (ref 2–4)
GLOBULIN SER CALC-MCNC: 4.1 G/DL (ref 2–4)
GLOBULIN SER CALC-MCNC: 4.2 G/DL (ref 2–4)
GLOBULIN SER CALC-MCNC: 4.3 G/DL (ref 2–4)
GLOBULIN SER CALC-MCNC: 4.4 G/DL (ref 2–4)
GLOBULIN SER CALC-MCNC: 4.4 G/DL (ref 2–4)
GLOBULIN SER CALC-MCNC: 4.7 G/DL (ref 2–4)
GLUCOSE BLD STRIP.AUTO-MCNC: 102 MG/DL (ref 65–100)
GLUCOSE BLD STRIP.AUTO-MCNC: 108 MG/DL (ref 65–100)
GLUCOSE BLD STRIP.AUTO-MCNC: 109 MG/DL (ref 65–100)
GLUCOSE BLD STRIP.AUTO-MCNC: 113 MG/DL (ref 65–100)
GLUCOSE BLD STRIP.AUTO-MCNC: 114 MG/DL (ref 65–100)
GLUCOSE BLD STRIP.AUTO-MCNC: 114 MG/DL (ref 65–100)
GLUCOSE BLD STRIP.AUTO-MCNC: 119 MG/DL (ref 65–100)
GLUCOSE BLD STRIP.AUTO-MCNC: 121 MG/DL (ref 65–100)
GLUCOSE BLD STRIP.AUTO-MCNC: 124 MG/DL (ref 65–100)
GLUCOSE BLD STRIP.AUTO-MCNC: 128 MG/DL (ref 65–100)
GLUCOSE BLD STRIP.AUTO-MCNC: 131 MG/DL (ref 65–100)
GLUCOSE BLD STRIP.AUTO-MCNC: 131 MG/DL (ref 65–100)
GLUCOSE BLD STRIP.AUTO-MCNC: 142 MG/DL (ref 65–100)
GLUCOSE BLD STRIP.AUTO-MCNC: 143 MG/DL (ref 65–100)
GLUCOSE BLD STRIP.AUTO-MCNC: 146 MG/DL (ref 65–100)
GLUCOSE BLD STRIP.AUTO-MCNC: 146 MG/DL (ref 65–100)
GLUCOSE BLD STRIP.AUTO-MCNC: 159 MG/DL (ref 65–100)
GLUCOSE BLD STRIP.AUTO-MCNC: 161 MG/DL (ref 65–100)
GLUCOSE BLD STRIP.AUTO-MCNC: 175 MG/DL (ref 65–100)
GLUCOSE BLD STRIP.AUTO-MCNC: 187 MG/DL (ref 65–100)
GLUCOSE BLD STRIP.AUTO-MCNC: 194 MG/DL (ref 65–100)
GLUCOSE BLD STRIP.AUTO-MCNC: 204 MG/DL (ref 65–100)
GLUCOSE BLD STRIP.AUTO-MCNC: 249 MG/DL (ref 65–100)
GLUCOSE BLD STRIP.AUTO-MCNC: 420 MG/DL (ref 65–100)
GLUCOSE BLD STRIP.AUTO-MCNC: 427 MG/DL (ref 65–100)
GLUCOSE BLD STRIP.AUTO-MCNC: 63 MG/DL (ref 65–100)
GLUCOSE BLD STRIP.AUTO-MCNC: 66 MG/DL (ref 65–100)
GLUCOSE BLD STRIP.AUTO-MCNC: 70 MG/DL (ref 65–100)
GLUCOSE BLD STRIP.AUTO-MCNC: 78 MG/DL (ref 65–100)
GLUCOSE BLD STRIP.AUTO-MCNC: 81 MG/DL (ref 65–100)
GLUCOSE BLD STRIP.AUTO-MCNC: 82 MG/DL (ref 65–100)
GLUCOSE BLD STRIP.AUTO-MCNC: 85 MG/DL (ref 65–100)
GLUCOSE BLD STRIP.AUTO-MCNC: 86 MG/DL (ref 65–100)
GLUCOSE BLD STRIP.AUTO-MCNC: 87 MG/DL (ref 65–100)
GLUCOSE BLD STRIP.AUTO-MCNC: 89 MG/DL (ref 65–100)
GLUCOSE BLD STRIP.AUTO-MCNC: 93 MG/DL (ref 65–100)
GLUCOSE BLD STRIP.AUTO-MCNC: 95 MG/DL (ref 65–100)
GLUCOSE BLD STRIP.AUTO-MCNC: 96 MG/DL (ref 65–100)
GLUCOSE BLD STRIP.AUTO-MCNC: 99 MG/DL (ref 65–100)
GLUCOSE BLD STRIP.AUTO-MCNC: 99 MG/DL (ref 65–100)
GLUCOSE SERPL-MCNC: 114 MG/DL (ref 65–100)
GLUCOSE SERPL-MCNC: 125 MG/DL (ref 65–100)
GLUCOSE SERPL-MCNC: 140 MG/DL (ref 65–100)
GLUCOSE SERPL-MCNC: 142 MG/DL (ref 65–100)
GLUCOSE SERPL-MCNC: 152 MG/DL (ref 65–100)
GLUCOSE SERPL-MCNC: 156 MG/DL (ref 65–100)
GLUCOSE SERPL-MCNC: 178 MG/DL (ref 65–100)
GLUCOSE SERPL-MCNC: 180 MG/DL (ref 65–100)
GLUCOSE SERPL-MCNC: 182 MG/DL (ref 65–99)
GLUCOSE SERPL-MCNC: 190 MG/DL (ref 65–100)
GLUCOSE SERPL-MCNC: 191 MG/DL (ref 65–100)
GLUCOSE SERPL-MCNC: 191 MG/DL (ref 65–100)
GLUCOSE SERPL-MCNC: 196 MG/DL (ref 65–100)
GLUCOSE SERPL-MCNC: 203 MG/DL (ref 65–99)
GLUCOSE SERPL-MCNC: 240 MG/DL (ref 65–100)
GLUCOSE SERPL-MCNC: 358 MG/DL (ref 65–100)
GLUCOSE SERPL-MCNC: 53 MG/DL (ref 65–100)
GLUCOSE SERPL-MCNC: 68 MG/DL (ref 65–100)
GLUCOSE SERPL-MCNC: 68 MG/DL (ref 65–100)
GLUCOSE SERPL-MCNC: 76 MG/DL (ref 65–100)
GLUCOSE SERPL-MCNC: 85 MG/DL (ref 65–100)
GLUCOSE UR STRIP.AUTO-MCNC: NEGATIVE MG/DL
GLUCOSE UR STRIP.AUTO-MCNC: NEGATIVE MG/DL
HCT VFR BLD AUTO: 24.5 % (ref 36.6–50.3)
HCT VFR BLD AUTO: 25.6 % (ref 36.6–50.3)
HCT VFR BLD AUTO: 25.8 % (ref 36.6–50.3)
HCT VFR BLD AUTO: 26.8 % (ref 36.6–50.3)
HCT VFR BLD AUTO: 27.1 % (ref 36.6–50.3)
HCT VFR BLD AUTO: 27.8 % (ref 36.6–50.3)
HCT VFR BLD AUTO: 28.6 % (ref 36.6–50.3)
HCT VFR BLD AUTO: 29 % (ref 36.6–50.3)
HCT VFR BLD AUTO: 29.1 % (ref 36.6–50.3)
HCT VFR BLD AUTO: 29.2 % (ref 36.6–50.3)
HCT VFR BLD AUTO: 29.3 % (ref 36.6–50.3)
HCT VFR BLD AUTO: 29.9 % (ref 36.6–50.3)
HCT VFR BLD AUTO: 30 % (ref 36.6–50.3)
HCT VFR BLD AUTO: 30.2 % (ref 36.6–50.3)
HCT VFR BLD AUTO: 30.4 % (ref 36.6–50.3)
HCT VFR BLD AUTO: 30.5 % (ref 36.6–50.3)
HCT VFR BLD AUTO: 31 % (ref 36.6–50.3)
HCT VFR BLD AUTO: 31.4 % (ref 36.6–50.3)
HCT VFR BLD AUTO: 34.9 % (ref 36.6–50.3)
HCT VFR BLD AUTO: 35.1 % (ref 36.6–50.3)
HDLC SERPL-MCNC: 32 MG/DL
HDLC SERPL: 2.3 {RATIO} (ref 0–5)
HGB BLD-MCNC: 10.5 G/DL (ref 12.1–17)
HGB BLD-MCNC: 11 G/DL (ref 12.1–17)
HGB BLD-MCNC: 8.1 G/DL (ref 12.1–17)
HGB BLD-MCNC: 8.4 G/DL (ref 12.1–17)
HGB BLD-MCNC: 8.5 G/DL (ref 12.1–17)
HGB BLD-MCNC: 8.5 G/DL (ref 12.1–17)
HGB BLD-MCNC: 8.7 G/DL (ref 12.1–17)
HGB BLD-MCNC: 8.8 G/DL (ref 12.1–17)
HGB BLD-MCNC: 8.9 G/DL (ref 12.1–17)
HGB BLD-MCNC: 9.1 G/DL (ref 12.1–17)
HGB BLD-MCNC: 9.2 G/DL (ref 12.1–17)
HGB BLD-MCNC: 9.4 G/DL (ref 12.1–17)
HGB BLD-MCNC: 9.5 G/DL (ref 12.1–17)
HGB BLD-MCNC: 9.6 G/DL (ref 12.1–17)
HGB BLD-MCNC: 9.7 G/DL (ref 12.1–17)
HGB UR QL STRIP: ABNORMAL
HGB UR QL STRIP: NEGATIVE
HYALINE CASTS URNS QL MICRO: ABNORMAL /LPF (ref 0–5)
HYALINE CASTS URNS QL MICRO: ABNORMAL /LPF (ref 0–5)
INR PPP: 1.5 (ref 0.9–1.1)
INR PPP: 1.6 (ref 0.9–1.1)
INTERPRETATION: NORMAL
INTERPRETATION: NORMAL
IRON SATN MFR SERPL: 10 % (ref 20–50)
IRON SATN MFR SERPL: 11 % (ref 20–50)
IRON SATN MFR SERPL: 15 % (ref 20–50)
IRON SERPL-MCNC: 20 UG/DL (ref 35–150)
IRON SERPL-MCNC: 24 UG/DL (ref 35–150)
IRON SERPL-MCNC: 43 UG/DL (ref 35–150)
KETONES UR QL STRIP.AUTO: NEGATIVE MG/DL
KETONES UR QL STRIP.AUTO: NEGATIVE MG/DL
LDLC SERPL CALC-MCNC: 26.4 MG/DL (ref 0–100)
LEUKOCYTE ESTERASE UR QL STRIP.AUTO: ABNORMAL
LEUKOCYTE ESTERASE UR QL STRIP.AUTO: NEGATIVE
LIPID PROFILE,FLP: NORMAL
LYMPHOCYTES # BLD AUTO: 17 % (ref 12–49)
LYMPHOCYTES # BLD AUTO: 17 % (ref 12–49)
LYMPHOCYTES # BLD AUTO: 24 % (ref 12–49)
LYMPHOCYTES # BLD AUTO: 25 % (ref 12–49)
LYMPHOCYTES # BLD AUTO: 26 % (ref 12–49)
LYMPHOCYTES # BLD AUTO: 27 % (ref 12–49)
LYMPHOCYTES # BLD AUTO: 28 % (ref 12–49)
LYMPHOCYTES # BLD: 1.1 K/UL (ref 0.8–3.5)
LYMPHOCYTES # BLD: 1.1 K/UL (ref 0.8–3.5)
LYMPHOCYTES # BLD: 1.2 K/UL (ref 0.8–3.5)
LYMPHOCYTES # BLD: 1.4 K/UL (ref 0.8–3.5)
LYMPHOCYTES # BLD: 1.7 K/UL (ref 0.8–3.5)
LYMPHOCYTES # BLD: 1.8 K/UL (ref 0.8–3.5)
LYMPHOCYTES # BLD: 1.8 K/UL (ref 0.8–3.5)
LYMPHOCYTES # BLD: 1.9 K/UL (ref 0.8–3.5)
LYMPHOCYTES # BLD: 2 K/UL (ref 0.8–3.5)
LYMPHOCYTES # BLD: 2.2 K/UL (ref 0.8–3.5)
LYMPHOCYTES # BLD: 2.3 K/UL (ref 0.8–3.5)
LYMPHOCYTES NFR BLD: 15 % (ref 12–49)
LYMPHOCYTES NFR BLD: 17 % (ref 12–49)
LYMPHOCYTES NFR BLD: 21 % (ref 12–49)
LYMPHOCYTES NFR BLD: 4 % (ref 12–49)
LYMPHOCYTES NFR BLD: 5 % (ref 12–49)
LYMPHOCYTES NFR BLD: 7 % (ref 12–49)
LYMPHOCYTES NFR BLD: 8 % (ref 12–49)
LYMPHOCYTES NFR BLD: 8 % (ref 12–49)
MAGNESIUM SERPL-MCNC: 2.1 MG/DL (ref 1.6–2.4)
MAGNESIUM SERPL-MCNC: 2.2 MG/DL (ref 1.6–2.3)
MAGNESIUM SERPL-MCNC: 2.3 MG/DL (ref 1.6–2.4)
MCH RBC QN AUTO: 24.2 PG (ref 26–34)
MCH RBC QN AUTO: 24.2 PG (ref 26–34)
MCH RBC QN AUTO: 24.3 PG (ref 26–34)
MCH RBC QN AUTO: 24.7 PG (ref 26–34)
MCH RBC QN AUTO: 24.8 PG (ref 26–34)
MCH RBC QN AUTO: 24.9 PG (ref 26–34)
MCH RBC QN AUTO: 25.6 PG (ref 26–34)
MCH RBC QN AUTO: 25.7 PG (ref 26–34)
MCH RBC QN AUTO: 25.8 PG (ref 26–34)
MCH RBC QN AUTO: 26 PG (ref 26–34)
MCH RBC QN AUTO: 26.3 PG (ref 26–34)
MCH RBC QN AUTO: 26.4 PG (ref 26–34)
MCH RBC QN AUTO: 26.6 PG (ref 26–34)
MCH RBC QN AUTO: 26.8 PG (ref 26–34)
MCH RBC QN AUTO: 26.9 PG (ref 26–34)
MCH RBC QN AUTO: 27.1 PG (ref 26–34)
MCH RBC QN AUTO: 27.9 PG (ref 26–34)
MCHC RBC AUTO-ENTMCNC: 30.1 G/DL (ref 30–36.5)
MCHC RBC AUTO-ENTMCNC: 30.1 G/DL (ref 30–36.5)
MCHC RBC AUTO-ENTMCNC: 30.2 G/DL (ref 30–36.5)
MCHC RBC AUTO-ENTMCNC: 30.3 G/DL (ref 30–36.5)
MCHC RBC AUTO-ENTMCNC: 30.3 G/DL (ref 30–36.5)
MCHC RBC AUTO-ENTMCNC: 30.4 G/DL (ref 30–36.5)
MCHC RBC AUTO-ENTMCNC: 30.6 G/DL (ref 30–36.5)
MCHC RBC AUTO-ENTMCNC: 30.6 G/DL (ref 30–36.5)
MCHC RBC AUTO-ENTMCNC: 30.7 G/DL (ref 30–36.5)
MCHC RBC AUTO-ENTMCNC: 30.8 G/DL (ref 30–36.5)
MCHC RBC AUTO-ENTMCNC: 30.9 G/DL (ref 30–36.5)
MCHC RBC AUTO-ENTMCNC: 31 G/DL (ref 30–36.5)
MCHC RBC AUTO-ENTMCNC: 31.1 G/DL (ref 30–36.5)
MCHC RBC AUTO-ENTMCNC: 31.1 G/DL (ref 30–36.5)
MCHC RBC AUTO-ENTMCNC: 31.2 G/DL (ref 30–36.5)
MCHC RBC AUTO-ENTMCNC: 31.3 G/DL (ref 30–36.5)
MCHC RBC AUTO-ENTMCNC: 31.4 G/DL (ref 30–36.5)
MCHC RBC AUTO-ENTMCNC: 31.6 G/DL (ref 30–36.5)
MCHC RBC AUTO-ENTMCNC: 32.4 G/DL (ref 30–36.5)
MCHC RBC AUTO-ENTMCNC: 33.1 G/DL (ref 30–36.5)
MCV RBC AUTO: 77.7 FL (ref 80–99)
MCV RBC AUTO: 77.7 FL (ref 80–99)
MCV RBC AUTO: 78.3 FL (ref 80–99)
MCV RBC AUTO: 78.5 FL (ref 80–99)
MCV RBC AUTO: 79.4 FL (ref 80–99)
MCV RBC AUTO: 79.9 FL (ref 80–99)
MCV RBC AUTO: 80.3 FL (ref 80–99)
MCV RBC AUTO: 82 FL (ref 80–99)
MCV RBC AUTO: 83.3 FL (ref 80–99)
MCV RBC AUTO: 84.2 FL (ref 80–99)
MCV RBC AUTO: 84.3 FL (ref 80–99)
MCV RBC AUTO: 84.7 FL (ref 80–99)
MCV RBC AUTO: 85 FL (ref 80–99)
MCV RBC AUTO: 85.5 FL (ref 80–99)
MCV RBC AUTO: 85.8 FL (ref 80–99)
MCV RBC AUTO: 85.9 FL (ref 80–99)
MCV RBC AUTO: 85.9 FL (ref 80–99)
MCV RBC AUTO: 86.3 FL (ref 80–99)
MCV RBC AUTO: 86.4 FL (ref 80–99)
MCV RBC AUTO: 86.9 FL (ref 80–99)
MCV RBC AUTO: 86.9 FL (ref 80–99)
MCV RBC AUTO: 87.5 FL (ref 80–99)
MONOCYTES # BLD: 0.4 K/UL (ref 0–1)
MONOCYTES # BLD: 0.5 K/UL (ref 0–1)
MONOCYTES # BLD: 0.7 K/UL (ref 0–1)
MONOCYTES # BLD: 0.8 K/UL (ref 0–1)
MONOCYTES # BLD: 0.8 K/UL (ref 0–1)
MONOCYTES # BLD: 0.9 K/UL (ref 0–1)
MONOCYTES # BLD: 1 K/UL (ref 0–1)
MONOCYTES # BLD: 1.1 K/UL (ref 0–1)
MONOCYTES # BLD: 1.2 K/UL (ref 0–1)
MONOCYTES # BLD: 1.4 K/UL (ref 0–1)
MONOCYTES # BLD: 1.4 K/UL (ref 0–1)
MONOCYTES # BLD: 1.5 K/UL (ref 0–1)
MONOCYTES # BLD: 1.7 K/UL (ref 0–1)
MONOCYTES NFR BLD AUTO: 5 % (ref 5–13)
MONOCYTES NFR BLD AUTO: 7 % (ref 5–13)
MONOCYTES NFR BLD AUTO: 9 % (ref 5–13)
MONOCYTES NFR BLD: 10 % (ref 5–13)
MONOCYTES NFR BLD: 4 % (ref 5–13)
MONOCYTES NFR BLD: 5 % (ref 5–13)
MONOCYTES NFR BLD: 6 % (ref 5–13)
NEUTS SEG # BLD: 13.7 K/UL (ref 1.8–8)
NEUTS SEG # BLD: 15.3 K/UL (ref 1.8–8)
NEUTS SEG # BLD: 22.6 K/UL (ref 1.8–8)
NEUTS SEG # BLD: 24.8 K/UL (ref 1.8–8)
NEUTS SEG # BLD: 25.6 K/UL (ref 1.8–8)
NEUTS SEG # BLD: 26.5 K/UL (ref 1.8–8)
NEUTS SEG # BLD: 26.9 K/UL (ref 1.8–8)
NEUTS SEG # BLD: 30.9 K/UL (ref 1.8–8)
NEUTS SEG # BLD: 4.6 K/UL (ref 1.8–8)
NEUTS SEG # BLD: 4.6 K/UL (ref 1.8–8)
NEUTS SEG # BLD: 5 K/UL (ref 1.8–8)
NEUTS SEG # BLD: 5.3 K/UL (ref 1.8–8)
NEUTS SEG # BLD: 5.3 K/UL (ref 1.8–8)
NEUTS SEG # BLD: 5.4 K/UL (ref 1.8–8)
NEUTS SEG # BLD: 5.9 K/UL (ref 1.8–8)
NEUTS SEG # BLD: 6.1 K/UL (ref 1.8–8)
NEUTS SEG # BLD: 7.2 K/UL (ref 1.8–8)
NEUTS SEG # BLD: 7.8 K/UL (ref 1.8–8)
NEUTS SEG # BLD: 7.9 K/UL (ref 1.8–8)
NEUTS SEG # BLD: 9.3 K/UL (ref 1.8–8)
NEUTS SEG NFR BLD AUTO: 60 % (ref 32–75)
NEUTS SEG NFR BLD AUTO: 63 % (ref 32–75)
NEUTS SEG NFR BLD AUTO: 64 % (ref 32–75)
NEUTS SEG NFR BLD AUTO: 65 % (ref 32–75)
NEUTS SEG NFR BLD AUTO: 70 % (ref 32–75)
NEUTS SEG NFR BLD AUTO: 73 % (ref 32–75)
NEUTS SEG NFR BLD AUTO: 74 % (ref 32–75)
NEUTS SEG NFR BLD: 68 % (ref 32–75)
NEUTS SEG NFR BLD: 72 % (ref 32–75)
NEUTS SEG NFR BLD: 76 % (ref 32–75)
NEUTS SEG NFR BLD: 86 % (ref 32–75)
NEUTS SEG NFR BLD: 87 % (ref 32–75)
NEUTS SEG NFR BLD: 88 % (ref 32–75)
NEUTS SEG NFR BLD: 90 % (ref 32–75)
NEUTS SEG NFR BLD: 91 % (ref 32–75)
NEUTS SEG NFR BLD: 92 % (ref 32–75)
NITRITE UR QL STRIP.AUTO: NEGATIVE
NITRITE UR QL STRIP.AUTO: NEGATIVE
NRBC # BLD: 0 K/UL (ref 0–0.01)
NRBC BLD-RTO: 0 PER 100 WBC
PH UR STRIP: 5.5 [PH] (ref 5–8)
PH UR STRIP: 7.5 [PH] (ref 5–8)
PHOSPHATE SERPL-MCNC: 3.8 MG/DL (ref 2.6–4.7)
PLATELET # BLD AUTO: 249 K/UL (ref 150–400)
PLATELET # BLD AUTO: 253 K/UL (ref 150–400)
PLATELET # BLD AUTO: 253 K/UL (ref 150–400)
PLATELET # BLD AUTO: 279 K/UL (ref 150–400)
PLATELET # BLD AUTO: 279 K/UL (ref 150–400)
PLATELET # BLD AUTO: 280 K/UL (ref 150–400)
PLATELET # BLD AUTO: 286 K/UL (ref 150–400)
PLATELET # BLD AUTO: 296 K/UL (ref 150–400)
PLATELET # BLD AUTO: 300 K/UL (ref 150–400)
PLATELET # BLD AUTO: 304 K/UL (ref 150–400)
PLATELET # BLD AUTO: 306 K/UL (ref 150–400)
PLATELET # BLD AUTO: 311 K/UL (ref 150–400)
PLATELET # BLD AUTO: 313 K/UL (ref 150–400)
PLATELET # BLD AUTO: 314 K/UL (ref 150–400)
PLATELET # BLD AUTO: 314 K/UL (ref 150–400)
PLATELET # BLD AUTO: 317 K/UL (ref 150–400)
PLATELET # BLD AUTO: 320 K/UL (ref 150–400)
PLATELET # BLD AUTO: 320 K/UL (ref 150–400)
PLATELET # BLD AUTO: 321 K/UL (ref 150–400)
PLATELET # BLD AUTO: 322 K/UL (ref 150–400)
PLATELET # BLD AUTO: 342 K/UL (ref 150–400)
PLATELET # BLD AUTO: 359 K/UL (ref 150–400)
PLATELET # BLD AUTO: 395 K/UL (ref 150–400)
PLATELET # BLD AUTO: 410 K/UL (ref 150–400)
POTASSIUM SERPL-SCNC: 3.3 MMOL/L (ref 3.5–5.1)
POTASSIUM SERPL-SCNC: 3.3 MMOL/L (ref 3.5–5.1)
POTASSIUM SERPL-SCNC: 3.4 MMOL/L (ref 3.5–5.1)
POTASSIUM SERPL-SCNC: 3.6 MMOL/L (ref 3.5–5.1)
POTASSIUM SERPL-SCNC: 3.7 MMOL/L (ref 3.5–5.1)
POTASSIUM SERPL-SCNC: 3.7 MMOL/L (ref 3.5–5.1)
POTASSIUM SERPL-SCNC: 3.9 MMOL/L (ref 3.5–5.1)
POTASSIUM SERPL-SCNC: 4 MMOL/L (ref 3.5–5.1)
POTASSIUM SERPL-SCNC: 4 MMOL/L (ref 3.5–5.1)
POTASSIUM SERPL-SCNC: 4.1 MMOL/L (ref 3.5–5.1)
POTASSIUM SERPL-SCNC: 4.1 MMOL/L (ref 3.5–5.1)
POTASSIUM SERPL-SCNC: 4.3 MMOL/L (ref 3.5–5.1)
POTASSIUM SERPL-SCNC: 4.4 MMOL/L (ref 3.5–5.2)
POTASSIUM SERPL-SCNC: 4.6 MMOL/L (ref 3.5–5.2)
POTASSIUM SERPL-SCNC: 4.7 MMOL/L (ref 3.5–5.1)
POTASSIUM SERPL-SCNC: 4.7 MMOL/L (ref 3.5–5.1)
POTASSIUM SERPL-SCNC: 5 MMOL/L (ref 3.5–5.1)
PROT SERPL-MCNC: 5.5 G/DL (ref 6.4–8.2)
PROT SERPL-MCNC: 5.9 G/DL (ref 6.4–8.2)
PROT SERPL-MCNC: 6.1 G/DL (ref 6.4–8.2)
PROT SERPL-MCNC: 6.6 G/DL (ref 6.4–8.2)
PROT SERPL-MCNC: 6.8 G/DL (ref 6–8.5)
PROT SERPL-MCNC: 6.9 G/DL (ref 6.4–8.2)
PROT SERPL-MCNC: 6.9 G/DL (ref 6.4–8.2)
PROT SERPL-MCNC: 7 G/DL (ref 6.4–8.2)
PROT SERPL-MCNC: 7.2 G/DL (ref 6.4–8.2)
PROT SERPL-MCNC: 7.2 G/DL (ref 6.4–8.2)
PROT SERPL-MCNC: 7.3 G/DL (ref 6.4–8.2)
PROT SERPL-MCNC: 7.3 G/DL (ref 6.4–8.2)
PROT SERPL-MCNC: 7.5 G/DL (ref 6.4–8.2)
PROT SERPL-MCNC: 8 G/DL (ref 6.4–8.2)
PROT UR STRIP-MCNC: ABNORMAL MG/DL
PROT UR STRIP-MCNC: ABNORMAL MG/DL
PROTHROMBIN TIME: 14.9 SEC (ref 9–11.1)
PROTHROMBIN TIME: 16.7 SEC (ref 9–11.1)
Q-T INTERVAL, ECG07: 492 MS
Q-T INTERVAL, ECG07: 492 MS
QRS DURATION, ECG06: 144 MS
QRS DURATION, ECG06: 156 MS
QTC CALCULATION (BEZET), ECG08: 531 MS
QTC CALCULATION (BEZET), ECG08: 531 MS
RBC # BLD AUTO: 3.12 M/UL (ref 4.1–5.7)
RBC # BLD AUTO: 3.2 M/UL (ref 4.1–5.7)
RBC # BLD AUTO: 3.2 M/UL (ref 4.1–5.7)
RBC # BLD AUTO: 3.25 M/UL (ref 4.1–5.7)
RBC # BLD AUTO: 3.27 M/UL (ref 4.1–5.7)
RBC # BLD AUTO: 3.31 M/UL (ref 4.1–5.7)
RBC # BLD AUTO: 3.31 M/UL (ref 4.1–5.7)
RBC # BLD AUTO: 3.36 M/UL (ref 4.1–5.7)
RBC # BLD AUTO: 3.38 M/UL (ref 4.1–5.7)
RBC # BLD AUTO: 3.39 M/UL (ref 4.1–5.7)
RBC # BLD AUTO: 3.4 M/UL (ref 4.1–5.7)
RBC # BLD AUTO: 3.41 M/UL (ref 4.1–5.7)
RBC # BLD AUTO: 3.45 M/UL (ref 4.1–5.7)
RBC # BLD AUTO: 3.5 M/UL (ref 4.1–5.7)
RBC # BLD AUTO: 3.59 M/UL (ref 4.1–5.7)
RBC # BLD AUTO: 3.66 M/UL (ref 4.1–5.7)
RBC # BLD AUTO: 3.68 M/UL (ref 4.1–5.7)
RBC # BLD AUTO: 3.73 M/UL (ref 4.1–5.7)
RBC # BLD AUTO: 3.8 M/UL (ref 4.1–5.7)
RBC # BLD AUTO: 3.88 M/UL (ref 4.1–5.7)
RBC # BLD AUTO: 4.04 M/UL (ref 4.1–5.7)
RBC # BLD AUTO: 4.13 M/UL (ref 4.1–5.7)
RBC #/AREA URNS HPF: ABNORMAL /HPF (ref 0–5)
RBC #/AREA URNS HPF: ABNORMAL /HPF (ref 0–5)
RBC MORPH BLD: ABNORMAL
SERVICE CMNT-IMP: ABNORMAL
SERVICE CMNT-IMP: NORMAL
SODIUM SERPL-SCNC: 129 MMOL/L (ref 136–145)
SODIUM SERPL-SCNC: 129 MMOL/L (ref 136–145)
SODIUM SERPL-SCNC: 131 MMOL/L (ref 136–145)
SODIUM SERPL-SCNC: 132 MMOL/L (ref 136–145)
SODIUM SERPL-SCNC: 133 MMOL/L (ref 136–145)
SODIUM SERPL-SCNC: 134 MMOL/L (ref 136–145)
SODIUM SERPL-SCNC: 135 MMOL/L (ref 136–145)
SODIUM SERPL-SCNC: 135 MMOL/L (ref 136–145)
SODIUM SERPL-SCNC: 136 MMOL/L (ref 136–145)
SODIUM SERPL-SCNC: 137 MMOL/L (ref 134–144)
SODIUM SERPL-SCNC: 137 MMOL/L (ref 136–145)
SODIUM SERPL-SCNC: 137 MMOL/L (ref 136–145)
SODIUM SERPL-SCNC: 139 MMOL/L (ref 134–144)
SP GR UR REFRACTOMETRY: 1.01 (ref 1–1.03)
SP GR UR REFRACTOMETRY: 1.02 (ref 1–1.03)
T4 FREE SERPL-MCNC: 1.8 NG/DL (ref 0.8–1.5)
T4 FREE SERPL-MCNC: 1.9 NG/DL (ref 0.8–1.5)
T4 FREE SERPL-MCNC: 2 NG/DL (ref 0.8–1.5)
T4 FREE SERPL-MCNC: 2 NG/DL (ref 0.8–1.5)
TIBC SERPL-MCNC: 184 UG/DL (ref 250–450)
TIBC SERPL-MCNC: 249 UG/DL (ref 250–450)
TIBC SERPL-MCNC: 291 UG/DL (ref 250–450)
TRIGL SERPL-MCNC: 78 MG/DL (ref ?–150)
TROPONIN I SERPL-MCNC: 0.04 NG/ML
TROPONIN I SERPL-MCNC: 0.07 NG/ML
TROPONIN I SERPL-MCNC: 0.08 NG/ML
TSH SERPL DL<=0.05 MIU/L-ACNC: 1.04 UIU/ML (ref 0.36–3.74)
TSH SERPL DL<=0.05 MIU/L-ACNC: 1.27 UIU/ML (ref 0.36–3.74)
TSH SERPL DL<=0.05 MIU/L-ACNC: 1.27 UIU/ML (ref 0.36–3.74)
TSH SERPL DL<=0.05 MIU/L-ACNC: 1.45 UIU/ML (ref 0.36–3.74)
TSH SERPL DL<=0.05 MIU/L-ACNC: 1.46 UIU/ML (ref 0.36–3.74)
UA: UC IF INDICATED,UAUC: ABNORMAL
UA: UC IF INDICATED,UAUC: ABNORMAL
UROBILINOGEN UR QL STRIP.AUTO: 1 EU/DL (ref 0.2–1)
UROBILINOGEN UR QL STRIP.AUTO: 1 EU/DL (ref 0.2–1)
VENTRICULAR RATE, ECG03: 70 BPM
VENTRICULAR RATE, ECG03: 70 BPM
VLDLC SERPL CALC-MCNC: 15.6 MG/DL
WBC # BLD AUTO: 10.6 K/UL (ref 4.1–11.1)
WBC # BLD AUTO: 10.7 K/UL (ref 4.1–11.1)
WBC # BLD AUTO: 10.8 K/UL (ref 4.1–11.1)
WBC # BLD AUTO: 12.3 K/UL (ref 4.1–11.1)
WBC # BLD AUTO: 14.8 K/UL (ref 4.1–11.1)
WBC # BLD AUTO: 15.8 K/UL (ref 4.1–11.1)
WBC # BLD AUTO: 17.8 K/UL (ref 4.1–11.1)
WBC # BLD AUTO: 25.6 K/UL (ref 4.1–11.1)
WBC # BLD AUTO: 25.9 K/UL (ref 4.1–11.1)
WBC # BLD AUTO: 27.3 K/UL (ref 4.1–11.1)
WBC # BLD AUTO: 28.1 K/UL (ref 4.1–11.1)
WBC # BLD AUTO: 28.9 K/UL (ref 4.1–11.1)
WBC # BLD AUTO: 29.6 K/UL (ref 4.1–11.1)
WBC # BLD AUTO: 30.6 K/UL (ref 4.1–11.1)
WBC # BLD AUTO: 34.3 K/UL (ref 4.1–11.1)
WBC # BLD AUTO: 7.2 K/UL (ref 4.1–11.1)
WBC # BLD AUTO: 7.7 K/UL (ref 4.1–11.1)
WBC # BLD AUTO: 7.7 K/UL (ref 4.1–11.1)
WBC # BLD AUTO: 7.8 K/UL (ref 4.1–11.1)
WBC # BLD AUTO: 7.9 K/UL (ref 4.1–11.1)
WBC # BLD AUTO: 8.5 K/UL (ref 4.1–11.1)
WBC # BLD AUTO: 8.8 K/UL (ref 4.1–11.1)
WBC # BLD AUTO: 9 K/UL (ref 4.1–11.1)
WBC # BLD AUTO: 9.9 K/UL (ref 4.1–11.1)
WBC MORPH BLD: ABNORMAL
WBC URNS QL MICRO: ABNORMAL /HPF (ref 0–4)
WBC URNS QL MICRO: ABNORMAL /HPF (ref 0–4)

## 2017-01-01 PROCEDURE — 84443 ASSAY THYROID STIM HORMONE: CPT | Performed by: INTERNAL MEDICINE

## 2017-01-01 PROCEDURE — 80053 COMPREHEN METABOLIC PANEL: CPT | Performed by: INTERNAL MEDICINE

## 2017-01-01 PROCEDURE — 74011250637 HC RX REV CODE- 250/637: Performed by: NURSE PRACTITIONER

## 2017-01-01 PROCEDURE — 93005 ELECTROCARDIOGRAM TRACING: CPT

## 2017-01-01 PROCEDURE — 74011250636 HC RX REV CODE- 250/636: Performed by: INTERNAL MEDICINE

## 2017-01-01 PROCEDURE — 82962 GLUCOSE BLOOD TEST: CPT

## 2017-01-01 PROCEDURE — 74011000258 HC RX REV CODE- 258: Performed by: INTERNAL MEDICINE

## 2017-01-01 PROCEDURE — 71020 XR CHEST PA LAT: CPT

## 2017-01-01 PROCEDURE — 74011636320 HC RX REV CODE- 636/320: Performed by: EMERGENCY MEDICINE

## 2017-01-01 PROCEDURE — 83540 ASSAY OF IRON: CPT | Performed by: INTERNAL MEDICINE

## 2017-01-01 PROCEDURE — 83880 ASSAY OF NATRIURETIC PEPTIDE: CPT | Performed by: EMERGENCY MEDICINE

## 2017-01-01 PROCEDURE — 36415 COLL VENOUS BLD VENIPUNCTURE: CPT | Performed by: INTERNAL MEDICINE

## 2017-01-01 PROCEDURE — 74011636637 HC RX REV CODE- 636/637: Performed by: INTERNAL MEDICINE

## 2017-01-01 PROCEDURE — 85025 COMPLETE CBC W/AUTO DIFF WBC: CPT | Performed by: INTERNAL MEDICINE

## 2017-01-01 PROCEDURE — 80053 COMPREHEN METABOLIC PANEL: CPT | Performed by: EMERGENCY MEDICINE

## 2017-01-01 PROCEDURE — 88342 IMHCHEM/IMCYTCHM 1ST ANTB: CPT | Performed by: INTERNAL MEDICINE

## 2017-01-01 PROCEDURE — 96413 CHEMO IV INFUSION 1 HR: CPT

## 2017-01-01 PROCEDURE — 88173 CYTOPATH EVAL FNA REPORT: CPT | Performed by: RADIOLOGY

## 2017-01-01 PROCEDURE — 71250 CT THORAX DX C-: CPT

## 2017-01-01 PROCEDURE — 88341 IMHCHEM/IMCYTCHM EA ADD ANTB: CPT | Performed by: RADIOLOGY

## 2017-01-01 PROCEDURE — 74011000250 HC RX REV CODE- 250: Performed by: UROLOGY

## 2017-01-01 PROCEDURE — 85025 COMPLETE CBC W/AUTO DIFF WBC: CPT | Performed by: EMERGENCY MEDICINE

## 2017-01-01 PROCEDURE — 74011250637 HC RX REV CODE- 250/637: Performed by: INTERNAL MEDICINE

## 2017-01-01 PROCEDURE — 74011250636 HC RX REV CODE- 250/636: Performed by: NURSE PRACTITIONER

## 2017-01-01 PROCEDURE — 74011000250 HC RX REV CODE- 250

## 2017-01-01 PROCEDURE — 65660000000 HC RM CCU STEPDOWN

## 2017-01-01 PROCEDURE — 49180 BIOPSY ABDOMINAL MASS: CPT

## 2017-01-01 PROCEDURE — C1726 CATH, BAL DIL, NON-VASCULAR: HCPCS | Performed by: INTERNAL MEDICINE

## 2017-01-01 PROCEDURE — 84439 ASSAY OF FREE THYROXINE: CPT | Performed by: INTERNAL MEDICINE

## 2017-01-01 PROCEDURE — 51798 US URINE CAPACITY MEASURE: CPT

## 2017-01-01 PROCEDURE — 82728 ASSAY OF FERRITIN: CPT | Performed by: INTERNAL MEDICINE

## 2017-01-01 PROCEDURE — 77030010936 HC CLP LIG BSC -C: Performed by: INTERNAL MEDICINE

## 2017-01-01 PROCEDURE — 74011000250 HC RX REV CODE- 250: Performed by: EMERGENCY MEDICINE

## 2017-01-01 PROCEDURE — 77030020255 HC SOL INJ LR 1000ML BG: Performed by: INTERNAL MEDICINE

## 2017-01-01 PROCEDURE — 74011250636 HC RX REV CODE- 250/636: Performed by: ANESTHESIOLOGY

## 2017-01-01 PROCEDURE — 84100 ASSAY OF PHOSPHORUS: CPT | Performed by: INTERNAL MEDICINE

## 2017-01-01 PROCEDURE — 96372 THER/PROPH/DIAG INJ SC/IM: CPT

## 2017-01-01 PROCEDURE — 81001 URINALYSIS AUTO W/SCOPE: CPT | Performed by: UROLOGY

## 2017-01-01 PROCEDURE — 77030037878 HC DRSG MEPILEX >48IN BORD MOLN -B

## 2017-01-01 PROCEDURE — 83735 ASSAY OF MAGNESIUM: CPT | Performed by: INTERNAL MEDICINE

## 2017-01-01 PROCEDURE — 36415 COLL VENOUS BLD VENIPUNCTURE: CPT | Performed by: EMERGENCY MEDICINE

## 2017-01-01 PROCEDURE — P9047 ALBUMIN (HUMAN), 25%, 50ML: HCPCS | Performed by: INTERNAL MEDICINE

## 2017-01-01 PROCEDURE — 71010 XR CHEST PORT: CPT

## 2017-01-01 PROCEDURE — 74011250636 HC RX REV CODE- 250/636: Performed by: EMERGENCY MEDICINE

## 2017-01-01 PROCEDURE — 77030011256 HC DRSG MEPILEX <16IN NO BORD MOLN -A

## 2017-01-01 PROCEDURE — 84484 ASSAY OF TROPONIN QUANT: CPT | Performed by: EMERGENCY MEDICINE

## 2017-01-01 PROCEDURE — 74176 CT ABD & PELVIS W/O CONTRAST: CPT

## 2017-01-01 PROCEDURE — 77030018781

## 2017-01-01 PROCEDURE — 97110 THERAPEUTIC EXERCISES: CPT

## 2017-01-01 PROCEDURE — 77030009426 HC FCPS BIOP ENDOSC BSC -B: Performed by: INTERNAL MEDICINE

## 2017-01-01 PROCEDURE — 85610 PROTHROMBIN TIME: CPT | Performed by: EMERGENCY MEDICINE

## 2017-01-01 PROCEDURE — 97165 OT EVAL LOW COMPLEX 30 MIN: CPT | Performed by: OCCUPATIONAL THERAPIST

## 2017-01-01 PROCEDURE — 74011250637 HC RX REV CODE- 250/637: Performed by: EMERGENCY MEDICINE

## 2017-01-01 PROCEDURE — 74011250636 HC RX REV CODE- 250/636: Performed by: RADIOLOGY

## 2017-01-01 PROCEDURE — 77030003497 HC NDL BIOP TISS BARD -B

## 2017-01-01 PROCEDURE — 96375 TX/PRO/DX INJ NEW DRUG ADDON: CPT

## 2017-01-01 PROCEDURE — 88305 TISSUE EXAM BY PATHOLOGIST: CPT | Performed by: RADIOLOGY

## 2017-01-01 PROCEDURE — 77030013992 HC SNR POLYP ENDOSC BSC -B: Performed by: INTERNAL MEDICINE

## 2017-01-01 PROCEDURE — G8987 SELF CARE CURRENT STATUS: HCPCS | Performed by: OCCUPATIONAL THERAPIST

## 2017-01-01 PROCEDURE — 99284 EMERGENCY DEPT VISIT MOD MDM: CPT

## 2017-01-01 PROCEDURE — 81001 URINALYSIS AUTO W/SCOPE: CPT | Performed by: EMERGENCY MEDICINE

## 2017-01-01 PROCEDURE — 80061 LIPID PANEL: CPT | Performed by: INTERNAL MEDICINE

## 2017-01-01 PROCEDURE — 80048 BASIC METABOLIC PNL TOTAL CA: CPT | Performed by: INTERNAL MEDICINE

## 2017-01-01 PROCEDURE — 83735 ASSAY OF MAGNESIUM: CPT | Performed by: EMERGENCY MEDICINE

## 2017-01-01 PROCEDURE — 94762 N-INVAS EAR/PLS OXIMTRY CONT: CPT

## 2017-01-01 PROCEDURE — 36415 COLL VENOUS BLD VENIPUNCTURE: CPT

## 2017-01-01 PROCEDURE — 71275 CT ANGIOGRAPHY CHEST: CPT

## 2017-01-01 PROCEDURE — 86301 IMMUNOASSAY TUMOR CA 19-9: CPT | Performed by: INTERNAL MEDICINE

## 2017-01-01 PROCEDURE — 74011250636 HC RX REV CODE- 250/636

## 2017-01-01 PROCEDURE — 85027 COMPLETE CBC AUTOMATED: CPT | Performed by: INTERNAL MEDICINE

## 2017-01-01 PROCEDURE — 88305 TISSUE EXAM BY PATHOLOGIST: CPT | Performed by: INTERNAL MEDICINE

## 2017-01-01 PROCEDURE — G8988 SELF CARE GOAL STATUS: HCPCS | Performed by: OCCUPATIONAL THERAPIST

## 2017-01-01 PROCEDURE — 74011000250 HC RX REV CODE- 250: Performed by: INTERNAL MEDICINE

## 2017-01-01 PROCEDURE — 99285 EMERGENCY DEPT VISIT HI MDM: CPT

## 2017-01-01 PROCEDURE — 82550 ASSAY OF CK (CPK): CPT | Performed by: EMERGENCY MEDICINE

## 2017-01-01 PROCEDURE — 97530 THERAPEUTIC ACTIVITIES: CPT | Performed by: OCCUPATIONAL THERAPIST

## 2017-01-01 PROCEDURE — 77030034849

## 2017-01-01 PROCEDURE — 77030014179 HC APPL CLP RESOL BSC -C: Performed by: INTERNAL MEDICINE

## 2017-01-01 PROCEDURE — 97530 THERAPEUTIC ACTIVITIES: CPT

## 2017-01-01 PROCEDURE — 77030003481 HC NDL BIOP GUN BARD -B

## 2017-01-01 PROCEDURE — 77010033678 HC OXYGEN DAILY

## 2017-01-01 PROCEDURE — 76060000061 HC AMB SURG ANES 0.5 TO 1 HR: Performed by: INTERNAL MEDICINE

## 2017-01-01 PROCEDURE — 96374 THER/PROPH/DIAG INJ IV PUSH: CPT

## 2017-01-01 PROCEDURE — P9047 ALBUMIN (HUMAN), 25%, 50ML: HCPCS | Performed by: EMERGENCY MEDICINE

## 2017-01-01 PROCEDURE — 77030003666 HC NDL SPINAL BD -A

## 2017-01-01 PROCEDURE — 97116 GAIT TRAINING THERAPY: CPT

## 2017-01-01 PROCEDURE — 96365 THER/PROPH/DIAG IV INF INIT: CPT

## 2017-01-01 PROCEDURE — 88342 IMHCHEM/IMCYTCHM 1ST ANTB: CPT | Performed by: RADIOLOGY

## 2017-01-01 PROCEDURE — 74150 CT ABDOMEN W/O CONTRAST: CPT

## 2017-01-01 PROCEDURE — 76450000000

## 2017-01-01 PROCEDURE — 76030000000 HC AMB SURG OR TIME 0.5 TO 1: Performed by: INTERNAL MEDICINE

## 2017-01-01 PROCEDURE — 93306 TTE W/DOPPLER COMPLETE: CPT

## 2017-01-01 PROCEDURE — 97161 PT EVAL LOW COMPLEX 20 MIN: CPT

## 2017-01-01 PROCEDURE — 74011000250 HC RX REV CODE- 250: Performed by: RADIOLOGY

## 2017-01-01 PROCEDURE — 99211 OFF/OP EST MAY X REQ PHY/QHP: CPT

## 2017-01-01 PROCEDURE — 76210000050 HC AMBSU PH II REC 0.5 TO 1 HR: Performed by: INTERNAL MEDICINE

## 2017-01-01 RX ORDER — SODIUM CHLORIDE 9 MG/ML
10 INJECTION INTRAMUSCULAR; INTRAVENOUS; SUBCUTANEOUS AS NEEDED
Status: ACTIVE | OUTPATIENT
Start: 2017-01-01 | End: 2017-01-01

## 2017-01-01 RX ORDER — PROPOFOL 10 MG/ML
INJECTION, EMULSION INTRAVENOUS AS NEEDED
Status: DISCONTINUED | OUTPATIENT
Start: 2017-01-01 | End: 2017-01-01 | Stop reason: HOSPADM

## 2017-01-01 RX ORDER — AMIODARONE HYDROCHLORIDE 200 MG/1
TABLET ORAL
Qty: 30 TAB | Refills: 6 | Status: SHIPPED | OUTPATIENT
Start: 2017-01-01

## 2017-01-01 RX ORDER — CARVEDILOL 25 MG/1
25 TABLET ORAL 2 TIMES DAILY WITH MEALS
COMMUNITY

## 2017-01-01 RX ORDER — SODIUM CHLORIDE 9 MG/ML
25 INJECTION, SOLUTION INTRAVENOUS CONTINUOUS
Status: DISPENSED | OUTPATIENT
Start: 2017-01-01 | End: 2017-01-01

## 2017-01-01 RX ORDER — LIDOCAINE HYDROCHLORIDE 10 MG/ML
0.1 INJECTION, SOLUTION EPIDURAL; INFILTRATION; INTRACAUDAL; PERINEURAL AS NEEDED
Status: DISCONTINUED | OUTPATIENT
Start: 2017-01-01 | End: 2017-01-01 | Stop reason: HOSPADM

## 2017-01-01 RX ORDER — FACIAL-BODY WIPES
10 EACH TOPICAL DAILY PRN
Status: DISCONTINUED | OUTPATIENT
Start: 2017-01-01 | End: 2017-01-01 | Stop reason: HOSPADM

## 2017-01-01 RX ORDER — ATORVASTATIN CALCIUM 20 MG/1
20 TABLET, FILM COATED ORAL
COMMUNITY
End: 2017-01-01

## 2017-01-01 RX ORDER — LISINOPRIL 5 MG/1
TABLET ORAL
COMMUNITY
Start: 2017-01-01 | End: 2017-01-01 | Stop reason: SDUPTHER

## 2017-01-01 RX ORDER — SPIRONOLACTONE 25 MG/1
25 TABLET ORAL DAILY
COMMUNITY
End: 2017-01-01

## 2017-01-01 RX ORDER — LISINOPRIL 2.5 MG/1
2.5 TABLET ORAL DAILY
Qty: 30 TAB | Refills: 2 | Status: SHIPPED | OUTPATIENT
Start: 2017-01-01 | End: 2017-01-01

## 2017-01-01 RX ORDER — FUROSEMIDE 10 MG/ML
80 INJECTION INTRAMUSCULAR; INTRAVENOUS
Status: COMPLETED | OUTPATIENT
Start: 2017-01-01 | End: 2017-01-01

## 2017-01-01 RX ORDER — ACETAMINOPHEN 325 MG/1
650 TABLET ORAL
Status: DISCONTINUED | OUTPATIENT
Start: 2017-01-01 | End: 2017-01-01 | Stop reason: HOSPADM

## 2017-01-01 RX ORDER — SODIUM CHLORIDE 0.9 % (FLUSH) 0.9 %
10-40 SYRINGE (ML) INJECTION AS NEEDED
Status: DISCONTINUED | OUTPATIENT
Start: 2017-01-01 | End: 2017-01-01 | Stop reason: HOSPADM

## 2017-01-01 RX ORDER — POTASSIUM CHLORIDE 750 MG/1
40 TABLET, FILM COATED, EXTENDED RELEASE ORAL
Status: COMPLETED | OUTPATIENT
Start: 2017-01-01 | End: 2017-01-01

## 2017-01-01 RX ORDER — SODIUM CHLORIDE 9 MG/ML
50 INJECTION, SOLUTION INTRAVENOUS ONCE
Status: DISPENSED | OUTPATIENT
Start: 2017-01-01 | End: 2017-01-01

## 2017-01-01 RX ORDER — SODIUM CHLORIDE 0.9 % (FLUSH) 0.9 %
10-40 SYRINGE (ML) INJECTION AS NEEDED
Status: ACTIVE | OUTPATIENT
Start: 2017-01-01 | End: 2017-01-01

## 2017-01-01 RX ORDER — FENTANYL CITRATE 50 UG/ML
100 INJECTION, SOLUTION INTRAMUSCULAR; INTRAVENOUS
Status: DISCONTINUED | OUTPATIENT
Start: 2017-01-01 | End: 2017-01-01 | Stop reason: HOSPADM

## 2017-01-01 RX ORDER — SODIUM CHLORIDE 0.9 % (FLUSH) 0.9 %
5-10 SYRINGE (ML) INJECTION EVERY 8 HOURS
Status: DISCONTINUED | OUTPATIENT
Start: 2017-01-01 | End: 2017-01-01 | Stop reason: HOSPADM

## 2017-01-01 RX ORDER — MIDAZOLAM HYDROCHLORIDE 1 MG/ML
5 INJECTION, SOLUTION INTRAMUSCULAR; INTRAVENOUS
Status: DISCONTINUED | OUTPATIENT
Start: 2017-01-01 | End: 2017-01-01 | Stop reason: HOSPADM

## 2017-01-01 RX ORDER — INSULIN LISPRO 100 [IU]/ML
5 INJECTION, SOLUTION INTRAVENOUS; SUBCUTANEOUS
Status: DISCONTINUED | OUTPATIENT
Start: 2017-01-01 | End: 2017-01-01

## 2017-01-01 RX ORDER — NITROFURANTOIN 25; 75 MG/1; MG/1
100 CAPSULE ORAL 2 TIMES DAILY
COMMUNITY
End: 2017-01-01

## 2017-01-01 RX ORDER — MAGNESIUM SULFATE 100 %
4 CRYSTALS MISCELLANEOUS AS NEEDED
Status: DISCONTINUED | OUTPATIENT
Start: 2017-01-01 | End: 2017-01-01 | Stop reason: HOSPADM

## 2017-01-01 RX ORDER — LISINOPRIL 5 MG/1
5 TABLET ORAL DAILY
COMMUNITY
End: 2017-01-01

## 2017-01-01 RX ORDER — BUMETANIDE 1 MG/1
1 TABLET ORAL 2 TIMES DAILY
Qty: 180 TAB | Refills: 3 | Status: SHIPPED | OUTPATIENT
Start: 2017-01-01 | End: 2017-01-01 | Stop reason: SDUPTHER

## 2017-01-01 RX ORDER — HEPARIN SODIUM 5000 [USP'U]/ML
5000 INJECTION, SOLUTION INTRAVENOUS; SUBCUTANEOUS EVERY 8 HOURS
Status: DISCONTINUED | OUTPATIENT
Start: 2017-01-01 | End: 2017-01-01

## 2017-01-01 RX ORDER — ONDANSETRON 2 MG/ML
4 INJECTION INTRAMUSCULAR; INTRAVENOUS
Status: DISCONTINUED | OUTPATIENT
Start: 2017-01-01 | End: 2017-01-01 | Stop reason: HOSPADM

## 2017-01-01 RX ORDER — HEPARIN 100 UNIT/ML
500 SYRINGE INTRAVENOUS AS NEEDED
Status: ACTIVE | OUTPATIENT
Start: 2017-01-01 | End: 2017-01-01

## 2017-01-01 RX ORDER — SODIUM CHLORIDE, SODIUM LACTATE, POTASSIUM CHLORIDE, CALCIUM CHLORIDE 600; 310; 30; 20 MG/100ML; MG/100ML; MG/100ML; MG/100ML
25 INJECTION, SOLUTION INTRAVENOUS CONTINUOUS
Status: DISCONTINUED | OUTPATIENT
Start: 2017-01-01 | End: 2017-01-01 | Stop reason: HOSPADM

## 2017-01-01 RX ORDER — BUMETANIDE 0.25 MG/ML
1 INJECTION INTRAMUSCULAR; INTRAVENOUS
Status: COMPLETED | OUTPATIENT
Start: 2017-01-01 | End: 2017-01-01

## 2017-01-01 RX ORDER — SODIUM CHLORIDE 0.9 % (FLUSH) 0.9 %
5-10 SYRINGE (ML) INJECTION AS NEEDED
Status: DISCONTINUED | OUTPATIENT
Start: 2017-01-01 | End: 2017-01-01 | Stop reason: HOSPADM

## 2017-01-01 RX ORDER — LANOLIN ALCOHOL/MO/W.PET/CERES
3 CREAM (GRAM) TOPICAL
Status: DISCONTINUED | OUTPATIENT
Start: 2017-01-01 | End: 2017-01-01 | Stop reason: HOSPADM

## 2017-01-01 RX ORDER — INSULIN GLARGINE 100 [IU]/ML
15 INJECTION, SOLUTION SUBCUTANEOUS DAILY
Status: DISCONTINUED | OUTPATIENT
Start: 2017-01-01 | End: 2017-01-01 | Stop reason: HOSPADM

## 2017-01-01 RX ORDER — AMOXICILLIN 500 MG/1
500 CAPSULE ORAL 2 TIMES DAILY
COMMUNITY
End: 2017-01-01

## 2017-01-01 RX ORDER — ONDANSETRON 2 MG/ML
4 INJECTION INTRAMUSCULAR; INTRAVENOUS AS NEEDED
Status: DISCONTINUED | OUTPATIENT
Start: 2017-01-01 | End: 2017-01-01 | Stop reason: HOSPADM

## 2017-01-01 RX ORDER — TAMSULOSIN HYDROCHLORIDE 0.4 MG/1
0.4 CAPSULE ORAL DAILY
COMMUNITY
End: 2017-01-01

## 2017-01-01 RX ORDER — HEPARIN SODIUM 5000 [USP'U]/ML
5000 INJECTION, SOLUTION INTRAVENOUS; SUBCUTANEOUS EVERY 12 HOURS
Status: DISCONTINUED | OUTPATIENT
Start: 2017-01-01 | End: 2017-01-01

## 2017-01-01 RX ORDER — ALBUMIN HUMAN 250 G/1000ML
12.5 SOLUTION INTRAVENOUS ONCE
Status: COMPLETED | OUTPATIENT
Start: 2017-01-01 | End: 2017-01-01

## 2017-01-01 RX ORDER — INSULIN LISPRO 100 [IU]/ML
INJECTION, SOLUTION INTRAVENOUS; SUBCUTANEOUS
Status: DISCONTINUED | OUTPATIENT
Start: 2017-01-01 | End: 2017-01-01 | Stop reason: HOSPADM

## 2017-01-01 RX ORDER — LISINOPRIL 5 MG/1
5 TABLET ORAL DAILY
Qty: 30 TAB | Refills: 6 | Status: SHIPPED | OUTPATIENT
Start: 2017-01-01 | End: 2017-01-01 | Stop reason: SDUPTHER

## 2017-01-01 RX ORDER — TAMSULOSIN HYDROCHLORIDE 0.4 MG/1
0.4 CAPSULE ORAL DAILY
Status: DISCONTINUED | OUTPATIENT
Start: 2017-01-01 | End: 2017-01-01

## 2017-01-01 RX ORDER — DOBUTAMINE HYDROCHLORIDE 400 MG/100ML
0-10 INJECTION INTRAVENOUS
Status: DISCONTINUED | OUTPATIENT
Start: 2017-01-01 | End: 2017-01-01 | Stop reason: HOSPADM

## 2017-01-01 RX ORDER — LIDOCAINE HYDROCHLORIDE 20 MG/ML
INJECTION, SOLUTION EPIDURAL; INFILTRATION; INTRACAUDAL; PERINEURAL AS NEEDED
Status: DISCONTINUED | OUTPATIENT
Start: 2017-01-01 | End: 2017-01-01 | Stop reason: HOSPADM

## 2017-01-01 RX ORDER — SODIUM CHLORIDE 9 MG/ML
25 INJECTION, SOLUTION INTRAVENOUS AS NEEDED
Status: DISPENSED | OUTPATIENT
Start: 2017-01-01 | End: 2017-01-01

## 2017-01-01 RX ORDER — SODIUM CHLORIDE 9 MG/ML
50 INJECTION, SOLUTION INTRAVENOUS ONCE
Status: COMPLETED | OUTPATIENT
Start: 2017-01-01 | End: 2017-01-01

## 2017-01-01 RX ORDER — SODIUM CHLORIDE 0.9 % (FLUSH) 0.9 %
5-10 SYRINGE (ML) INJECTION EVERY 8 HOURS
Status: DISCONTINUED | OUTPATIENT
Start: 2017-01-01 | End: 2017-01-01 | Stop reason: SDUPTHER

## 2017-01-01 RX ORDER — SODIUM CHLORIDE 9 MG/ML
250 INJECTION, SOLUTION INTRAVENOUS ONCE
Status: COMPLETED | OUTPATIENT
Start: 2017-01-01 | End: 2017-01-01

## 2017-01-01 RX ORDER — FENTANYL CITRATE 50 UG/ML
25 INJECTION, SOLUTION INTRAMUSCULAR; INTRAVENOUS
Status: DISCONTINUED | OUTPATIENT
Start: 2017-01-01 | End: 2017-01-01 | Stop reason: HOSPADM

## 2017-01-01 RX ORDER — DIPHENHYDRAMINE HYDROCHLORIDE 50 MG/ML
12.5 INJECTION, SOLUTION INTRAMUSCULAR; INTRAVENOUS AS NEEDED
Status: DISCONTINUED | OUTPATIENT
Start: 2017-01-01 | End: 2017-01-01 | Stop reason: HOSPADM

## 2017-01-01 RX ORDER — LISINOPRIL 10 MG/1
TABLET ORAL
Qty: 90 TAB | Refills: 2 | Status: SHIPPED | OUTPATIENT
Start: 2017-01-01 | End: 2017-01-01 | Stop reason: SDUPTHER

## 2017-01-01 RX ORDER — ATORVASTATIN CALCIUM 20 MG/1
20 TABLET, FILM COATED ORAL
Status: DISCONTINUED | OUTPATIENT
Start: 2017-01-01 | End: 2017-01-01

## 2017-01-01 RX ORDER — SODIUM CHLORIDE 9 MG/ML
10 INJECTION INTRAMUSCULAR; INTRAVENOUS; SUBCUTANEOUS AS NEEDED
Status: DISCONTINUED | OUTPATIENT
Start: 2017-01-01 | End: 2017-01-01

## 2017-01-01 RX ORDER — INSULIN GLARGINE 100 [IU]/ML
20 INJECTION, SOLUTION SUBCUTANEOUS DAILY
Status: DISCONTINUED | OUTPATIENT
Start: 2017-01-01 | End: 2017-01-01

## 2017-01-01 RX ORDER — BUMETANIDE 1 MG/1
TABLET ORAL
Qty: 180 TAB | Refills: 3
Start: 2017-01-01

## 2017-01-01 RX ORDER — GUAIFENESIN 100 MG/5ML
81 LIQUID (ML) ORAL DAILY
Status: DISCONTINUED | OUTPATIENT
Start: 2017-01-01 | End: 2017-01-01

## 2017-01-01 RX ORDER — LIDOCAINE HYDROCHLORIDE 20 MG/ML
JELLY TOPICAL ONCE
Status: COMPLETED | OUTPATIENT
Start: 2017-01-01 | End: 2017-01-01

## 2017-01-01 RX ORDER — DEXTROSE 50 % IN WATER (D50W) INTRAVENOUS SYRINGE
12.5-25 AS NEEDED
Status: DISCONTINUED | OUTPATIENT
Start: 2017-01-01 | End: 2017-01-01 | Stop reason: HOSPADM

## 2017-01-01 RX ORDER — INSULIN GLARGINE 100 [IU]/ML
36 INJECTION, SOLUTION SUBCUTANEOUS DAILY
Status: DISCONTINUED | OUTPATIENT
Start: 2017-01-01 | End: 2017-01-01

## 2017-01-01 RX ORDER — SODIUM CHLORIDE 0.9 % (FLUSH) 0.9 %
5-10 SYRINGE (ML) INJECTION AS NEEDED
Status: DISCONTINUED | OUTPATIENT
Start: 2017-01-01 | End: 2017-01-01 | Stop reason: SDUPTHER

## 2017-01-01 RX ORDER — PHENYLEPHRINE HCL IN 0.9% NACL 0.4MG/10ML
SYRINGE (ML) INTRAVENOUS AS NEEDED
Status: DISCONTINUED | OUTPATIENT
Start: 2017-01-01 | End: 2017-01-01 | Stop reason: HOSPADM

## 2017-01-01 RX ORDER — LOPERAMIDE HYDROCHLORIDE 2 MG/1
2 CAPSULE ORAL AS NEEDED
Status: DISCONTINUED | OUTPATIENT
Start: 2017-01-01 | End: 2017-01-01 | Stop reason: HOSPADM

## 2017-01-01 RX ORDER — SODIUM CHLORIDE 0.9 % (FLUSH) 0.9 %
5-10 SYRINGE (ML) INJECTION AS NEEDED
Status: ACTIVE | OUTPATIENT
Start: 2017-01-01 | End: 2017-01-01

## 2017-01-01 RX ORDER — INSULIN LISPRO 100 [IU]/ML
10 INJECTION, SOLUTION INTRAVENOUS; SUBCUTANEOUS ONCE
Status: COMPLETED | OUTPATIENT
Start: 2017-01-01 | End: 2017-01-01

## 2017-01-01 RX ORDER — AMOXICILLIN 250 MG/1
500 CAPSULE ORAL EVERY 12 HOURS
Status: COMPLETED | OUTPATIENT
Start: 2017-01-01 | End: 2017-01-01

## 2017-01-01 RX ORDER — TAMSULOSIN HYDROCHLORIDE 0.4 MG/1
0.4 CAPSULE ORAL
Status: DISCONTINUED | OUTPATIENT
Start: 2017-01-01 | End: 2017-01-01

## 2017-01-01 RX ORDER — AMIODARONE HYDROCHLORIDE 200 MG/1
200 TABLET ORAL DAILY
Status: DISCONTINUED | OUTPATIENT
Start: 2017-01-01 | End: 2017-01-01 | Stop reason: HOSPADM

## 2017-01-01 RX ORDER — FUROSEMIDE 10 MG/ML
40 INJECTION INTRAMUSCULAR; INTRAVENOUS 2 TIMES DAILY
Status: DISCONTINUED | OUTPATIENT
Start: 2017-01-01 | End: 2017-01-01

## 2017-01-01 RX ORDER — SPIRONOLACTONE 25 MG/1
TABLET ORAL
Qty: 60 TAB | Refills: 0 | Status: SHIPPED | OUTPATIENT
Start: 2017-01-01 | End: 2017-01-01 | Stop reason: SDUPTHER

## 2017-01-01 RX ORDER — SODIUM CHLORIDE 9 MG/ML
50 INJECTION, SOLUTION INTRAVENOUS
Status: COMPLETED | OUTPATIENT
Start: 2017-01-01 | End: 2017-01-01

## 2017-01-01 RX ORDER — SODIUM CHLORIDE 0.9 % (FLUSH) 0.9 %
10 SYRINGE (ML) INJECTION
Status: COMPLETED | OUTPATIENT
Start: 2017-01-01 | End: 2017-01-01

## 2017-01-01 RX ORDER — DOBUTAMINE HYDROCHLORIDE 400 MG/100ML
5 INJECTION INTRAVENOUS
Status: DISCONTINUED | OUTPATIENT
Start: 2017-01-01 | End: 2017-01-01

## 2017-01-01 RX ORDER — CARVEDILOL 25 MG/1
TABLET ORAL
Qty: 60 TAB | Refills: 0 | Status: SHIPPED | OUTPATIENT
Start: 2017-01-01 | End: 2017-01-01 | Stop reason: SDUPTHER

## 2017-01-01 RX ADMIN — Medication 10 ML: at 21:05

## 2017-01-01 RX ADMIN — Medication 10 ML: at 12:07

## 2017-01-01 RX ADMIN — DOBUTAMINE IN DEXTROSE 10 MCG/KG/MIN: 400 INJECTION, SOLUTION INTRAVENOUS at 06:11

## 2017-01-01 RX ADMIN — Medication 10 ML: at 06:36

## 2017-01-01 RX ADMIN — INSULIN LISPRO 5 UNITS: 100 INJECTION, SOLUTION INTRAVENOUS; SUBCUTANEOUS at 17:48

## 2017-01-01 RX ADMIN — AMOXICILLIN 500 MG: 250 CAPSULE ORAL at 08:04

## 2017-01-01 RX ADMIN — SODIUM CHLORIDE 50 ML/HR: 900 INJECTION, SOLUTION INTRAVENOUS at 09:25

## 2017-01-01 RX ADMIN — SODIUM BICARBONATE 1 ML: 0.2 INJECTION, SOLUTION INTRAVENOUS at 10:10

## 2017-01-01 RX ADMIN — FUROSEMIDE 10 MG/HR: 10 INJECTION INTRAVENOUS at 21:37

## 2017-01-01 RX ADMIN — INSULIN GLARGINE 20 UNITS: 100 INJECTION, SOLUTION SUBCUTANEOUS at 08:04

## 2017-01-01 RX ADMIN — INSULIN LISPRO 2 UNITS: 100 INJECTION, SOLUTION INTRAVENOUS; SUBCUTANEOUS at 12:46

## 2017-01-01 RX ADMIN — INSULIN LISPRO 5 UNITS: 100 INJECTION, SOLUTION INTRAVENOUS; SUBCUTANEOUS at 08:06

## 2017-01-01 RX ADMIN — IOPAMIDOL 80 ML: 755 INJECTION, SOLUTION INTRAVENOUS at 12:38

## 2017-01-01 RX ADMIN — Medication 10 ML: at 12:38

## 2017-01-01 RX ADMIN — MELATONIN TAB 3 MG 3 MG: 3 TAB at 21:05

## 2017-01-01 RX ADMIN — SODIUM CHLORIDE 500 ML: 900 INJECTION, SOLUTION INTRAVENOUS at 09:50

## 2017-01-01 RX ADMIN — ERYTHROPOIETIN 10000 UNITS: 10000 INJECTION, SOLUTION INTRAVENOUS; SUBCUTANEOUS at 08:30

## 2017-01-01 RX ADMIN — INSULIN GLARGINE 20 UNITS: 100 INJECTION, SOLUTION SUBCUTANEOUS at 08:00

## 2017-01-01 RX ADMIN — SODIUM CHLORIDE 240 MG: 900 INJECTION, SOLUTION INTRAVENOUS at 12:38

## 2017-01-01 RX ADMIN — INSULIN LISPRO 2 UNITS: 100 INJECTION, SOLUTION INTRAVENOUS; SUBCUTANEOUS at 17:44

## 2017-01-01 RX ADMIN — APIXABAN 5 MG: 5 TABLET, FILM COATED ORAL at 17:41

## 2017-01-01 RX ADMIN — FUROSEMIDE 40 MG: 10 INJECTION, SOLUTION INTRAMUSCULAR; INTRAVENOUS at 17:02

## 2017-01-01 RX ADMIN — Medication 120 MCG: at 09:43

## 2017-01-01 RX ADMIN — Medication 10 ML: at 05:06

## 2017-01-01 RX ADMIN — ONDANSETRON HYDROCHLORIDE 4 MG: 2 INJECTION, SOLUTION INTRAMUSCULAR; INTRAVENOUS at 22:43

## 2017-01-01 RX ADMIN — NITROGLYCERIN 0.5 INCH: 20 OINTMENT TOPICAL at 10:29

## 2017-01-01 RX ADMIN — APIXABAN 5 MG: 5 TABLET, FILM COATED ORAL at 06:19

## 2017-01-01 RX ADMIN — SODIUM CHLORIDE 250 ML: 900 INJECTION, SOLUTION INTRAVENOUS at 22:46

## 2017-01-01 RX ADMIN — SODIUM CHLORIDE 240 MG: 900 INJECTION, SOLUTION INTRAVENOUS at 09:02

## 2017-01-01 RX ADMIN — INSULIN LISPRO 5 UNITS: 100 INJECTION, SOLUTION INTRAVENOUS; SUBCUTANEOUS at 11:05

## 2017-01-01 RX ADMIN — TAMSULOSIN HYDROCHLORIDE 0.4 MG: 0.4 CAPSULE ORAL at 08:16

## 2017-01-01 RX ADMIN — INSULIN LISPRO 2 UNITS: 100 INJECTION, SOLUTION INTRAVENOUS; SUBCUTANEOUS at 11:19

## 2017-01-01 RX ADMIN — Medication 10 ML: at 15:13

## 2017-01-01 RX ADMIN — FUROSEMIDE 10 MG/HR: 10 INJECTION INTRAVENOUS at 21:59

## 2017-01-01 RX ADMIN — INSULIN LISPRO 5 UNITS: 100 INJECTION, SOLUTION INTRAVENOUS; SUBCUTANEOUS at 11:18

## 2017-01-01 RX ADMIN — ALBUMIN (HUMAN) 12.5 G: 0.25 INJECTION, SOLUTION INTRAVENOUS at 12:09

## 2017-01-01 RX ADMIN — HEPARIN SODIUM 5000 UNITS: 5000 INJECTION, SOLUTION INTRAVENOUS; SUBCUTANEOUS at 15:33

## 2017-01-01 RX ADMIN — SODIUM CHLORIDE 25 ML/HR: 900 INJECTION, SOLUTION INTRAVENOUS at 10:42

## 2017-01-01 RX ADMIN — Medication 10 ML: at 08:45

## 2017-01-01 RX ADMIN — Medication 80 MCG: at 09:39

## 2017-01-01 RX ADMIN — ERYTHROPOIETIN 10000 UNITS: 10000 INJECTION, SOLUTION INTRAVENOUS; SUBCUTANEOUS at 10:16

## 2017-01-01 RX ADMIN — APIXABAN 5 MG: 5 TABLET, FILM COATED ORAL at 05:13

## 2017-01-01 RX ADMIN — DOBUTAMINE IN DEXTROSE 10 MCG/KG/MIN: 400 INJECTION, SOLUTION INTRAVENOUS at 01:11

## 2017-01-01 RX ADMIN — SODIUM CHLORIDE 25 ML/HR: 900 INJECTION, SOLUTION INTRAVENOUS at 10:15

## 2017-01-01 RX ADMIN — Medication 10 ML: at 21:41

## 2017-01-01 RX ADMIN — AMOXICILLIN 500 MG: 250 CAPSULE ORAL at 20:39

## 2017-01-01 RX ADMIN — SODIUM CHLORIDE 240 MG: 900 INJECTION, SOLUTION INTRAVENOUS at 10:28

## 2017-01-01 RX ADMIN — INSULIN LISPRO 2 UNITS: 100 INJECTION, SOLUTION INTRAVENOUS; SUBCUTANEOUS at 08:18

## 2017-01-01 RX ADMIN — INSULIN GLARGINE 20 UNITS: 100 INJECTION, SOLUTION SUBCUTANEOUS at 08:17

## 2017-01-01 RX ADMIN — ERYTHROPOIETIN 10000 UNITS: 10000 INJECTION, SOLUTION INTRAVENOUS; SUBCUTANEOUS at 09:15

## 2017-01-01 RX ADMIN — HEPARIN SODIUM 5000 UNITS: 5000 INJECTION, SOLUTION INTRAVENOUS; SUBCUTANEOUS at 03:13

## 2017-01-01 RX ADMIN — Medication 10 ML: at 15:28

## 2017-01-01 RX ADMIN — Medication 10 ML: at 05:09

## 2017-01-01 RX ADMIN — ERYTHROPOIETIN 10000 UNITS: 10000 INJECTION, SOLUTION INTRAVENOUS; SUBCUTANEOUS at 13:58

## 2017-01-01 RX ADMIN — ASPIRIN 81 MG 81 MG: 81 TABLET ORAL at 08:04

## 2017-01-01 RX ADMIN — INSULIN GLARGINE 15 UNITS: 100 INJECTION, SOLUTION SUBCUTANEOUS at 08:30

## 2017-01-01 RX ADMIN — SODIUM CHLORIDE 250 ML/HR: 900 INJECTION, SOLUTION INTRAVENOUS at 22:00

## 2017-01-01 RX ADMIN — Medication 10 ML: at 23:17

## 2017-01-01 RX ADMIN — APIXABAN 5 MG: 5 TABLET, FILM COATED ORAL at 17:20

## 2017-01-01 RX ADMIN — AMOXICILLIN 500 MG: 250 CAPSULE ORAL at 17:02

## 2017-01-01 RX ADMIN — SODIUM CHLORIDE 240 MG: 900 INJECTION, SOLUTION INTRAVENOUS at 09:25

## 2017-01-01 RX ADMIN — INSULIN GLARGINE 15 UNITS: 100 INJECTION, SOLUTION SUBCUTANEOUS at 08:51

## 2017-01-01 RX ADMIN — AMOXICILLIN 500 MG: 250 CAPSULE ORAL at 08:16

## 2017-01-01 RX ADMIN — APIXABAN 5 MG: 5 TABLET, FILM COATED ORAL at 17:44

## 2017-01-01 RX ADMIN — POTASSIUM CHLORIDE 40 MEQ: 750 TABLET, FILM COATED, EXTENDED RELEASE ORAL at 11:00

## 2017-01-01 RX ADMIN — ERYTHROPOIETIN 10000 UNITS: 10000 INJECTION, SOLUTION INTRAVENOUS; SUBCUTANEOUS at 08:54

## 2017-01-01 RX ADMIN — FENTANYL CITRATE 25 MCG: 50 INJECTION, SOLUTION INTRAMUSCULAR; INTRAVENOUS at 09:51

## 2017-01-01 RX ADMIN — DOBUTAMINE HYDROCHLORIDE 2.5 MCG/KG/MIN: 400 INJECTION INTRAVENOUS at 15:29

## 2017-01-01 RX ADMIN — DEXTROSE MONOHYDRATE 25 G: 25 INJECTION, SOLUTION INTRAVENOUS at 04:40

## 2017-01-01 RX ADMIN — Medication 10 ML: at 22:00

## 2017-01-01 RX ADMIN — Medication 10 ML: at 10:34

## 2017-01-01 RX ADMIN — Medication 10 ML: at 13:18

## 2017-01-01 RX ADMIN — INSULIN LISPRO 5 UNITS: 100 INJECTION, SOLUTION INTRAVENOUS; SUBCUTANEOUS at 16:55

## 2017-01-01 RX ADMIN — Medication 10 ML: at 14:49

## 2017-01-01 RX ADMIN — Medication 10 ML: at 13:00

## 2017-01-01 RX ADMIN — PROPOFOL 30 MG: 10 INJECTION, EMULSION INTRAVENOUS at 09:26

## 2017-01-01 RX ADMIN — INSULIN LISPRO 2 UNITS: 100 INJECTION, SOLUTION INTRAVENOUS; SUBCUTANEOUS at 21:08

## 2017-01-01 RX ADMIN — ERYTHROPOIETIN 10000 UNITS: 10000 INJECTION, SOLUTION INTRAVENOUS; SUBCUTANEOUS at 09:45

## 2017-01-01 RX ADMIN — BUMETANIDE 1 MG: 0.25 INJECTION INTRAMUSCULAR; INTRAVENOUS at 10:29

## 2017-01-01 RX ADMIN — ACETAMINOPHEN 650 MG: 325 TABLET ORAL at 00:44

## 2017-01-01 RX ADMIN — AMIODARONE HYDROCHLORIDE 200 MG: 200 TABLET ORAL at 08:30

## 2017-01-01 RX ADMIN — FUROSEMIDE 80 MG: 10 INJECTION, SOLUTION INTRAMUSCULAR; INTRAVENOUS at 12:04

## 2017-01-01 RX ADMIN — SODIUM CHLORIDE 50 ML/HR: 900 INJECTION, SOLUTION INTRAVENOUS at 12:39

## 2017-01-01 RX ADMIN — Medication 10 ML: at 15:00

## 2017-01-01 RX ADMIN — PROPOFOL 30 MG: 10 INJECTION, EMULSION INTRAVENOUS at 09:18

## 2017-01-01 RX ADMIN — LOPERAMIDE HYDROCHLORIDE 2 MG: 2 CAPSULE ORAL at 17:34

## 2017-01-01 RX ADMIN — FENTANYL CITRATE 12.5 MCG: 50 INJECTION, SOLUTION INTRAMUSCULAR; INTRAVENOUS at 10:11

## 2017-01-01 RX ADMIN — Medication 10 ML: at 05:53

## 2017-01-01 RX ADMIN — PROPOFOL 30 MG: 10 INJECTION, EMULSION INTRAVENOUS at 09:31

## 2017-01-01 RX ADMIN — DOBUTAMINE IN DEXTROSE 10 MCG/KG/MIN: 400 INJECTION, SOLUTION INTRAVENOUS at 18:12

## 2017-01-01 RX ADMIN — AMIODARONE HYDROCHLORIDE 200 MG: 200 TABLET ORAL at 09:10

## 2017-01-01 RX ADMIN — INSULIN LISPRO 3 UNITS: 100 INJECTION, SOLUTION INTRAVENOUS; SUBCUTANEOUS at 23:18

## 2017-01-01 RX ADMIN — FUROSEMIDE 10 MG/HR: 10 INJECTION INTRAVENOUS at 17:56

## 2017-01-01 RX ADMIN — DOBUTAMINE IN DEXTROSE 10 MCG/KG/MIN: 400 INJECTION, SOLUTION INTRAVENOUS at 05:52

## 2017-01-01 RX ADMIN — INSULIN LISPRO 2 UNITS: 100 INJECTION, SOLUTION INTRAVENOUS; SUBCUTANEOUS at 12:22

## 2017-01-01 RX ADMIN — FUROSEMIDE 10 MG/HR: 10 INJECTION INTRAVENOUS at 06:11

## 2017-01-01 RX ADMIN — ONDANSETRON HYDROCHLORIDE 4 MG: 2 INJECTION, SOLUTION INTRAMUSCULAR; INTRAVENOUS at 15:13

## 2017-01-01 RX ADMIN — ERYTHROPOIETIN 10000 UNITS: 10000 INJECTION, SOLUTION INTRAVENOUS; SUBCUTANEOUS at 09:27

## 2017-01-01 RX ADMIN — SODIUM CHLORIDE 240 MG: 900 INJECTION, SOLUTION INTRAVENOUS at 10:42

## 2017-01-01 RX ADMIN — MIDAZOLAM HYDROCHLORIDE 1 MG: 1 INJECTION INTRAMUSCULAR; INTRAVENOUS at 10:07

## 2017-01-01 RX ADMIN — INSULIN LISPRO 2 UNITS: 100 INJECTION, SOLUTION INTRAVENOUS; SUBCUTANEOUS at 17:11

## 2017-01-01 RX ADMIN — INSULIN LISPRO 2 UNITS: 100 INJECTION, SOLUTION INTRAVENOUS; SUBCUTANEOUS at 22:16

## 2017-01-01 RX ADMIN — AMIODARONE HYDROCHLORIDE 200 MG: 200 TABLET ORAL at 08:22

## 2017-01-01 RX ADMIN — PROPOFOL 20 MG: 10 INJECTION, EMULSION INTRAVENOUS at 09:35

## 2017-01-01 RX ADMIN — SODIUM CHLORIDE 25 ML/HR: 900 INJECTION, SOLUTION INTRAVENOUS at 08:55

## 2017-01-01 RX ADMIN — INSULIN LISPRO 5 UNITS: 100 INJECTION, SOLUTION INTRAVENOUS; SUBCUTANEOUS at 12:22

## 2017-01-01 RX ADMIN — DOBUTAMINE HYDROCHLORIDE 9 MCG/KG/MIN: 400 INJECTION INTRAVENOUS at 21:46

## 2017-01-01 RX ADMIN — Medication 10 ML: at 22:15

## 2017-01-01 RX ADMIN — Medication 10 ML: at 08:25

## 2017-01-01 RX ADMIN — LIDOCAINE HYDROCHLORIDE 100 MG: 20 INJECTION, SOLUTION EPIDURAL; INFILTRATION; INTRACAUDAL; PERINEURAL at 09:18

## 2017-01-01 RX ADMIN — ASPIRIN 81 MG 81 MG: 81 TABLET ORAL at 08:16

## 2017-01-01 RX ADMIN — Medication 10 ML: at 22:16

## 2017-01-01 RX ADMIN — APIXABAN 5 MG: 5 TABLET, FILM COATED ORAL at 17:11

## 2017-01-01 RX ADMIN — AMIODARONE HYDROCHLORIDE 200 MG: 200 TABLET ORAL at 08:16

## 2017-01-01 RX ADMIN — MELATONIN TAB 3 MG 3 MG: 3 TAB at 21:35

## 2017-01-01 RX ADMIN — INSULIN LISPRO 5 UNITS: 100 INJECTION, SOLUTION INTRAVENOUS; SUBCUTANEOUS at 15:31

## 2017-01-01 RX ADMIN — APIXABAN 5 MG: 5 TABLET, FILM COATED ORAL at 05:09

## 2017-01-01 RX ADMIN — SODIUM CHLORIDE 25 ML/HR: 900 INJECTION, SOLUTION INTRAVENOUS at 11:00

## 2017-01-01 RX ADMIN — Medication 10 ML: at 14:05

## 2017-01-01 RX ADMIN — AMIODARONE HYDROCHLORIDE 200 MG: 200 TABLET ORAL at 08:00

## 2017-01-01 RX ADMIN — ERYTHROPOIETIN 10000 UNITS: 10000 INJECTION, SOLUTION INTRAVENOUS; SUBCUTANEOUS at 09:50

## 2017-01-01 RX ADMIN — INSULIN LISPRO 10 UNITS: 100 INJECTION, SOLUTION INTRAVENOUS; SUBCUTANEOUS at 16:54

## 2017-01-01 RX ADMIN — Medication 10 ML: at 08:20

## 2017-01-01 RX ADMIN — ERYTHROPOIETIN 10000 UNITS: 10000 INJECTION, SOLUTION INTRAVENOUS; SUBCUTANEOUS at 11:40

## 2017-01-01 RX ADMIN — ALBUMIN (HUMAN) 12.5 G: 0.25 INJECTION, SOLUTION INTRAVENOUS at 13:59

## 2017-01-01 RX ADMIN — SODIUM CHLORIDE 240 MG: 900 INJECTION, SOLUTION INTRAVENOUS at 09:26

## 2017-01-01 RX ADMIN — PROPOFOL 20 MG: 10 INJECTION, EMULSION INTRAVENOUS at 09:40

## 2017-01-01 RX ADMIN — Medication 10 ML: at 13:09

## 2017-01-01 RX ADMIN — SODIUM CHLORIDE 240 MG: 900 INJECTION, SOLUTION INTRAVENOUS at 13:34

## 2017-01-01 RX ADMIN — INSULIN GLARGINE 15 UNITS: 100 INJECTION, SOLUTION SUBCUTANEOUS at 09:10

## 2017-01-01 RX ADMIN — SODIUM CHLORIDE 25 ML/HR: 900 INJECTION, SOLUTION INTRAVENOUS at 09:26

## 2017-01-01 RX ADMIN — Medication 10 ML: at 10:13

## 2017-01-01 RX ADMIN — SODIUM CHLORIDE 10 MG: 9 INJECTION INTRAMUSCULAR; INTRAVENOUS; SUBCUTANEOUS at 10:08

## 2017-01-01 RX ADMIN — ERYTHROPOIETIN 10000 UNITS: 10000 INJECTION, SOLUTION INTRAVENOUS; SUBCUTANEOUS at 09:42

## 2017-01-01 RX ADMIN — Medication 120 MCG: at 09:50

## 2017-01-01 RX ADMIN — SODIUM CHLORIDE, POTASSIUM CHLORIDE, SODIUM LACTATE AND CALCIUM CHLORIDE 25 ML/HR: 600; 310; 30; 20 INJECTION, SOLUTION INTRAVENOUS at 08:33

## 2017-01-01 RX ADMIN — Medication 10 ML: at 08:31

## 2017-01-01 RX ADMIN — SODIUM CHLORIDE 500 ML: 900 INJECTION, SOLUTION INTRAVENOUS at 23:00

## 2017-01-01 RX ADMIN — DOBUTAMINE IN DEXTROSE 10 MCG/KG/MIN: 400 INJECTION, SOLUTION INTRAVENOUS at 13:09

## 2017-01-01 RX ADMIN — SODIUM CHLORIDE 240 MG: 900 INJECTION, SOLUTION INTRAVENOUS at 10:35

## 2017-01-01 RX ADMIN — ERYTHROPOIETIN 10000 UNITS: 10000 INJECTION, SOLUTION INTRAVENOUS; SUBCUTANEOUS at 10:33

## 2017-01-01 RX ADMIN — Medication 20 ML: at 09:01

## 2017-01-01 RX ADMIN — Medication 10 ML: at 06:11

## 2017-01-01 RX ADMIN — AMIODARONE HYDROCHLORIDE 200 MG: 200 TABLET ORAL at 08:04

## 2017-01-01 RX ADMIN — INSULIN LISPRO 5 UNITS: 100 INJECTION, SOLUTION INTRAVENOUS; SUBCUTANEOUS at 08:18

## 2017-01-01 RX ADMIN — Medication 10 ML: at 05:41

## 2017-01-01 RX ADMIN — APIXABAN 5 MG: 5 TABLET, FILM COATED ORAL at 06:11

## 2017-01-01 RX ADMIN — MIDAZOLAM HYDROCHLORIDE 1 MG: 1 INJECTION INTRAMUSCULAR; INTRAVENOUS at 09:51

## 2017-01-01 RX ADMIN — APIXABAN 5 MG: 5 TABLET, FILM COATED ORAL at 17:31

## 2017-01-01 RX ADMIN — PROPOFOL 30 MG: 10 INJECTION, EMULSION INTRAVENOUS at 09:28

## 2017-01-01 RX ADMIN — ONDANSETRON HYDROCHLORIDE 4 MG: 2 INJECTION, SOLUTION INTRAMUSCULAR; INTRAVENOUS at 08:25

## 2017-01-01 RX ADMIN — LIDOCAINE HYDROCHLORIDE: 20 JELLY TOPICAL at 22:00

## 2017-01-01 RX ADMIN — DOBUTAMINE IN DEXTROSE 10 MCG/KG/MIN: 400 INJECTION, SOLUTION INTRAVENOUS at 12:59

## 2017-01-01 RX ADMIN — AMIODARONE HYDROCHLORIDE 200 MG: 200 TABLET ORAL at 08:48

## 2017-01-01 RX ADMIN — SODIUM CHLORIDE 240 MG: 900 INJECTION, SOLUTION INTRAVENOUS at 11:02

## 2017-01-01 RX ADMIN — PROPOFOL 20 MG: 10 INJECTION, EMULSION INTRAVENOUS at 09:22

## 2017-01-01 RX ADMIN — APIXABAN 5 MG: 5 TABLET, FILM COATED ORAL at 05:41

## 2017-01-01 RX ADMIN — Medication 10 ML: at 15:33

## 2017-01-01 RX ADMIN — INSULIN LISPRO 5 UNITS: 100 INJECTION, SOLUTION INTRAVENOUS; SUBCUTANEOUS at 17:31

## 2017-01-01 RX ADMIN — TAMSULOSIN HYDROCHLORIDE 0.4 MG: 0.4 CAPSULE ORAL at 21:05

## 2017-01-01 RX ADMIN — DOBUTAMINE HYDROCHLORIDE 8 MCG/KG/MIN: 400 INJECTION INTRAVENOUS at 17:00

## 2017-01-01 RX ADMIN — INSULIN LISPRO 5 UNITS: 100 INJECTION, SOLUTION INTRAVENOUS; SUBCUTANEOUS at 08:10

## 2017-01-01 RX ADMIN — ACETAMINOPHEN 650 MG: 325 TABLET ORAL at 10:06

## 2017-01-01 RX ADMIN — ERYTHROPOIETIN 10000 UNITS: 10000 INJECTION, SOLUTION INTRAVENOUS; SUBCUTANEOUS at 11:56

## 2017-01-01 RX ADMIN — Medication 10 ML: at 21:35

## 2017-01-01 RX ADMIN — Medication 80 MCG: at 09:33

## 2017-01-18 NOTE — ROUTINE PROCESS
Pt reports pain free and declined po intake - reviewed discharge instructions as did prior to procedure - pt verbalized understanding of all reviewed & copy given to pt and placed in his blue folder - Casper CHRISTIAN removed saline lock and Melissa CHRISTIAN wheeled pt to entrance of [facility] where family drove pt home.

## 2017-01-18 NOTE — DISCHARGE INSTRUCTIONS
[facility]  Special Procedures/Radiology Department    Radiologist:    Dr. Dawson Arnett    Date:    1/18/2017     Biopsy Discharge Instructions    You may have an aching pain in the biopsy site tonight. You may Tylenol, as directed on the label, for pain or discomfort. Avoid ibuprofen (Advil, Motrin) and aspirin for the next 48 hours as these drugs may cause you to bleed. Watch for bleeding from the biopsy site. Hold pressure to the area for at least 15 minutes if bleeding does occur. If you have severe pain or swelling at the site, go directly to the nearest Emergency Room. Watch for signs of infection:  redness, pain, pus, drainage, fever or chills. If this occurs, call your doctor. Resume your previous diet and follow the medication reconciliation form. Rest the remainder of today. Do not lift anything heavier than a small grocery bag (10 pounds) for the next 5 days. It may take up to 3 days for your test results to become available to your physician. Call your physician if you have not heard anything after 3 business day. If you have any questions or concerns, please call [phone] and ask to speak to the nurse on-call.

## 2017-01-18 NOTE — IP AVS SNAPSHOT
[address] [facility] 
[phone] Patient: Retia Simmonds. MRN: [id number] You are allergic to the following No active allergies Recent Documentation Smoking Status Former Smoker Emergency Contacts Name Discharge Info Relation Home Work Mobile Arlet Hernandez  Spouse [3] [phone] About your hospitalization You were admitted on:  January 18, 2017 You last received care in the:  [facility] RAD CT You were discharged on:  January 18, 2017 Unit phone number:  [phone] Why you were hospitalized Your primary diagnosis was:  Not on File Providers Seen During Your Hospitalizations Provider Role Specialty Primary office phone Abbe Betancourt MD Attending Provider Hematology and Oncology [phone] Your Primary Care Physician (PCP) Primary Care Physician Office Phone Office Fax Javier Liz [phone] Follow-up Information None Your Appointments Wednesday February 15, 2017 COLONOSCOPY, ESOPHAGOGASTRODUODENOSCOPY (EGD) with Alejandro Bustamante MD  
[facility] ENDOSCOPY (RI OR PRE ASSESSMENT) [facility]  
[phone] Current Discharge Medication List  
  
ASK your doctor about these medications Dose & Instructions Dispensing Information Comments Morning Noon Evening Bedtime  
 amiodarone 200 mg tablet Commonly known as:  CORDARONE Your next dose is: Today, Tomorrow Other:  _________ Dose:  200 mg Take 1 Tab by mouth daily. Quantity:  30 Tab Refills:  6  
     
   
   
   
  
 apixaban 5 mg tablet Commonly known as:  Beth Rankin What changed:  Another medication with the same name was removed. Continue taking this medication, and follow the directions you see here. Your next dose is: Today, Tomorrow Other:  _________ Dose:  5 mg Take 1 Tab by mouth two (2) times a day. Quantity:  180 Tab Refills:  3  
     
   
   
   
  
 aspirin delayed-release 81 mg tablet Your next dose is: Today, Tomorrow Other:  _________ Dose:  81 mg Take 1 Tab by mouth daily. Quantity:  30 Tab Refills:  1  
     
   
   
   
  
 atorvastatin 20 mg tablet Commonly known as:  LIPITOR Your next dose is: Today, Tomorrow Other:  _________ TAKE 1 TABLET EVERY DAY Quantity:  90 Tab Refills:  2  
     
   
   
   
  
 bumetanide 1 mg tablet Commonly known as:  Malachy Bile Your next dose is: Today, Tomorrow Other:  _________ TAKE 1 TABLET EVERY DAY Quantity:  90 Tab Refills:  2  
     
   
   
   
  
 carvedilol 25 mg tablet Commonly known as:  Macel Darshan Your next dose is: Today, Tomorrow Other:  _________ TAKE 1 TABLET TWICE DAILY Quantity:  180 Tab Refills:  2 HumuLIN 70/30 100 unit/mL (70-30) injection Generic drug:  insulin NPH/insulin regular Your next dose is: Today, Tomorrow Other:  _________  
   
   
 by SubCUTAneous route Before breakfast and dinner. 50 units qam and 30 units qhs Refills:  0  
     
   
   
   
  
 lisinopril 10 mg tablet Commonly known as:  Shelley Needy Your next dose is: Today, Tomorrow Other:  _________ TAKE 1 TABLET EVERY DAY Quantity:  90 Tab Refills:  2  
     
   
   
   
  
 spironolactone 25 mg tablet Commonly known as:  ALDACTONE Your next dose is: Today, Tomorrow Other:  _________ TAKE 1 TABLET EVERY DAY Quantity:  90 Tab Refills:  2 Discharge Instructions Facundo Herrera [facility] Special Procedures/Radiology Department Radiologist:    Dr. Jeanne Mccormack Date:    1/18/2017 Biopsy Discharge Instructions You may have an aching pain in the biopsy site tonight. You may Tylenol, as directed on the label, for pain or discomfort. Avoid ibuprofen (Advil, Motrin) and aspirin for the next 48 hours as these drugs may cause you to bleed. Watch for bleeding from the biopsy site. Hold pressure to the area for at least 15 minutes if bleeding does occur. If you have severe pain or swelling at the site, go directly to the nearest Emergency Room. Watch for signs of infection:  redness, pain, pus, drainage, fever or chills. If this occurs, call your doctor. Resume your previous diet and follow the medication reconciliation form. Rest the remainder of today. Do not lift anything heavier than a small grocery bag (10 pounds) for the next 5 days. It may take up to 3 days for your test results to become available to your physician. Call your physician if you have not heard anything after 3 business day. If you have any questions or concerns, please call [phone] and ask to speak to the nurse on-call. Discharge Orders None Introducing [facility]! Tona London introduces GetApp patient portal. Now you can access parts of your medical record, email your doctor's office, and request medication refills online. 1. In your internet browser, go to [email or web address] 2. Click on the First Time User? Click Here link in the Sign In box. You will see the New Member Sign Up page. 3. Enter your GetApp Access Code exactly as it appears below. You will not need to use this code after youve completed the sign-up process. If you do not sign up before the expiration date, you must request a new code. · GetApp Access Code: [email or web address] Expires: 4/10/2017  8:12 AM 
 
4. Enter the last four digits of your Social Security Number (xxxx) and Date of Birth (mm/dd/yyyy) as indicated and click Submit.  You will be taken to the next sign-up page. 5. Create a [employer] ID. This will be your [employer] login ID and cannot be changed, so think of one that is secure and easy to remember. 6. Create a [employer] password. You can change your password at any time. 7. Enter your Password Reset Question and Answer. This can be used at a later time if you forget your password. 8. Enter your e-mail address. You will receive e-mail notification when new information is available in [address] 9. Click Sign Up. You can now view and download portions of your medical record. 10. Click the Download Summary menu link to download a portable copy of your medical information. If you have questions, please visit the Frequently Asked Questions section of the [employer] website. Remember, [employer] is NOT to be used for urgent needs. For medical emergencies, dial 911. Now available from your iPhone and Android! General Information Please provide this summary of care documentation to your next provider. Patient Signature:  ____________________________________________________________ Date:  ____________________________________________________________  
  
Geoff Montalvo Provider Signature:  ____________________________________________________________ Date:  ____________________________________________________________

## 2017-01-18 NOTE — IP AVS SNAPSHOT
Current Discharge Medication List  
  
ASK your doctor about these medications Dose & Instructions Dispensing Information Comments Morning Noon Evening Bedtime  
 amiodarone 200 mg tablet Commonly known as:  CORDARONE Your next dose is: Today, Tomorrow Other:  ____________ Dose:  200 mg Take 1 Tab by mouth daily. Quantity:  30 Tab Refills:  6  
     
   
   
   
  
 apixaban 5 mg tablet Commonly known as:  Araceli Ricksinstein Your next dose is: Today, Tomorrow Other:  ____________ Dose:  5 mg Take 1 Tab by mouth two (2) times a day. Quantity:  180 Tab Refills:  3  
     
   
   
   
  
 aspirin delayed-release 81 mg tablet Your next dose is: Today, Tomorrow Other:  ____________ Dose:  81 mg Take 1 Tab by mouth daily. Quantity:  30 Tab Refills:  1  
     
   
   
   
  
 atorvastatin 20 mg tablet Commonly known as:  LIPITOR Your next dose is: Today, Tomorrow Other:  ____________ TAKE 1 TABLET EVERY DAY Quantity:  90 Tab Refills:  2  
     
   
   
   
  
 bumetanide 1 mg tablet Commonly known as:  Marisa Oas Your next dose is: Today, Tomorrow Other:  ____________ TAKE 1 TABLET EVERY DAY Quantity:  90 Tab Refills:  2  
     
   
   
   
  
 carvedilol 25 mg tablet Commonly known as:  Sharolyn Pastrana Your next dose is: Today, Tomorrow Other:  ____________ TAKE 1 TABLET TWICE DAILY Quantity:  180 Tab Refills:  2 HumuLIN 70/30 100 unit/mL (70-30) injection Generic drug:  insulin NPH/insulin regular Your next dose is: Today, Tomorrow Other:  ____________  
   
   
 by SubCUTAneous route Before breakfast and dinner. 50 units qam and 30 units qhs Refills:  0  
     
   
   
   
  
 lisinopril 10 mg tablet Commonly known as:  Ora Bee Your next dose is: Today, Tomorrow Other:  ____________ TAKE 1 TABLET EVERY DAY Quantity:  90 Tab Refills:  2  
     
   
   
   
  
 spironolactone 25 mg tablet Commonly known as:  ALDACTONE Your next dose is: Today, Tomorrow Other:  ____________ TAKE 1 TABLET EVERY DAY Quantity:  90 Tab Refills:  2

## 2017-01-18 NOTE — H&P
Radiology History and Physical    Patient: Eden Kawasaki. 78 y.o. male       Chief Complaint: No chief complaint on file. History of Present Illness: ct guided retroperitoneal mass biopsy    History:    Past Medical History   Diagnosis Date    CAD (coronary artery disease)     Chronic systolic heart failure (HCC)     Coronary atherosclerosis of native coronary artery     Diabetes (HCC)     Heart failure (HCC)     Other acute and subacute form of ischemic heart disease     Other ill-defined conditions(799.89)      elevated cholesterol    Paroxysmal ventricular tachycardia (HCC)     Presence of biventricular automatic cardioverter/defibrillator (AICD) 5/2/2016 5/2/16 [employer] upgrade to biventricular AICD implant    S/P ablation of atrial fibrillation 1/8/2016     Family History   Problem Relation Age of Onset    Heart Disease Mother     Hypertension Mother     Diabetes Mother     Diabetes Father     Heart Disease Father     Hypertension Father      Social History     Social History    Marital status:      Spouse name: N/A    Number of children: N/A    Years of education: N/A     Occupational History    Not on file. Social History Main Topics    Smoking status: Former Smoker     Quit date: 1/8/2005    Smokeless tobacco: Former User     Quit date: 4/9/2011    Alcohol use No    Drug use: Not on file    Sexual activity: Yes     Partners: Female     Other Topics Concern    Not on file     Social History Narrative       Allergies: No Known Allergies    Current Medications:  Current Outpatient Prescriptions   Medication Sig    amiodarone (CORDARONE) 200 mg tablet Take 1 Tab by mouth daily.  bumetanide (BUMEX) 1 mg tablet TAKE 1 TABLET EVERY DAY    atorvastatin (LIPITOR) 20 mg tablet TAKE 1 TABLET EVERY DAY    carvedilol (COREG) 25 mg tablet TAKE 1 TABLET TWICE DAILY    apixaban (ELIQUIS) 5 mg tablet Take 1 Tab by mouth two (2) times a day.     spironolactone (ALDACTONE) 25 mg tablet TAKE 1 TABLET EVERY DAY    insulin (HUMULIN 70/30) 100 unit/mL (70-30) injection by SubCUTAneous route Before breakfast and dinner. 50 units qam and 30 units qhs    lisinopril (PRINIVIL, ZESTRIL) 10 mg tablet TAKE 1 TABLET EVERY DAY    aspirin delayed-release 81 mg tablet Take 1 Tab by mouth daily. Current Facility-Administered Medications   Medication Dose Route Frequency    fentaNYL citrate (PF) injection 100 mcg  100 mcg IntraVENous RAD PRN    midazolam (VERSED) injection 5 mg  5 mg IntraVENous RAD PRN        Physical Exam:  Blood pressure 98/48, pulse 70, temperature 97.8 °F (36.6 °C), resp. rate 16, height 5' 9\" (1.753 m), weight 94.3 kg (208 lb), SpO2 95 %. LUNG: clear to auscultation bilaterally, HEART: regular rate and rhythm      Alerts:    Hospital Problems  Date Reviewed: 11/10/2016    None          Laboratory:    No results for input(s): HGB, HCT, WBC, PLT, INR, BUN, CREA, K, CRCLT, HGBEXT, HCTEXT, PLTEXT in the last 72 hours. No lab exists for component: PTT, PT, INREXT      Plan of Care/Planned Procedure:  Risks, benefits, and alternatives reviewed with patient and he agrees to proceed with the procedure.        Claude Taveras MD

## 2017-03-21 NOTE — DISCHARGE INSTRUCTIONS
Hypoglycemia: Care Instructions  Your Care Instructions  Hypoglycemia means that your blood sugar is low and your body is not getting enough fuel. Some people get low blood sugar from not eating often enough. Some medicines to treat diabetes can cause low blood sugar. People who have had surgery on their stomachs or intestines may get hypoglycemia. Problems with the pancreas, kidneys, or liver also can cause low blood sugar. A snack or drink with sugar in it will raise your blood sugar and should ease your symptoms right away. Your doctor may recommend that you change or stop your medicines until you can get your blood sugar levels under control. In the long run, you may need to change your diet and eating habits so that you get enough fuel for your body throughout the day. Follow-up care is a key part of your treatment and safety. Be sure to make and go to all appointments, and call your doctor if you are having problems. It's also a good idea to know your test results and keep a list of the medicines you take. How can you care for yourself at home? · Learn to recognize the early signs of low blood sugar. Signs include:  ¨ Nausea. ¨ Hunger. ¨ Feeling nervous, irritable, or shaky. ¨ Cold, clammy, wet skin. ¨ Sweating (when you are not exercising). ¨ A fast heartbeat. ¨ Numbness or tingling of the fingertips or lips. · If you feel an episode of low blood sugar coming on, drink fruit juice or sugared (not diet) soda, or eat sugar in the form of candy, cubes, or tablets. Con[employer]ream are another [employer]. · Eat small, frequent meals so that you do not get too hungry between meals. · Balance extra exercise with eating more. · Keep a written record of your low blood sugar episodes, including when you last ate and what you ate, so that you can learn what causes your blood sugar to drop.   · Make sure your family, friends, and coworkers know the symptoms of low blood sugar and know what to do to get your sugar level up. · Wear medical alert jewelry that lists your condition. You can buy this at most drugstores. When should you call for help? Call 911 anytime you think you may need emergency care. For example, call if:  · You have a seizure. · You passed out (lost consciousness), or you suddenly become very sleepy or confused. (You may have very low blood sugar.)  Call your doctor now or seek immediate medical care if:  · You have symptoms of low blood sugar, such as:  ¨ Sweating. ¨ Feeling nervous, shaky, and weak. ¨ Extreme hunger and slight nausea. ¨ Dizziness and headache. ¨ Blurred vision. ¨ Confusion. Watch closely for changes in your health, and be sure to contact your doctor if:  · Your symptoms continue or return. Where can you learn more? Go to http://tooCerRxamber.info/. Enter H713 in the search box to learn more about \"Hypoglycemia: Care Instructions. \"  Current as of: May 23, 2016  Content Version: 11.1  © 1495-6379 [employer]. Care instructions adapted under license by [employer] (which disclaims liability or warranty for this information). If you have questions about a medical condition or this instruction, always ask your healthcare professional. [address] any warranty or liability for your use of this information. Learning About Low Blood Sugar (Hypoglycemia) in Diabetes  What is low blood sugar (hypoglycemia)? Hypoglycemia means that your blood sugar is low and your body (especially your brain) is not getting enough fuel. If you have diabetes, your blood sugar can go too low if you take too much of some diabetes medicines. It can also go too low if you miss a meal. And it can happen if you exercise too hard without eating enough food. Some medicines used to treat other health problems can cause low blood sugar too. What are the symptoms? Symptoms of low blood sugar can start quickly.  It may take just 10 to 15 minutes. If you have had diabetes for many years, you may not realize that your blood sugar is low until it drops very low. · If your blood sugar level drops below 70 (mild low blood sugar), you may feel tired, anxious, dizzy, weak, shaky, or sweaty. You may have a fast heartbeat or blurry vision. · If your blood sugar level continues to drop (usually below 40), your behavior may change. You may feel more irritable. You may find it hard to concentrate or talk. And you may feel unsteady when you stand or walk. You may become too weak or confused to eat something with sugar to raise your blood sugar level. · If your blood sugar level drops very low (usually below 20), you may pass out (lose consciousness). Or you may have a seizure or stroke. If you have symptoms of severe low blood sugar, you need to get medical care right away. If you had a low blood sugar level during the night, you may wake up tired or with a headache. Or you may sweat so much during the night that your pajamas or sheets are damp when you wake up. How is low blood sugar treated? You can treat low blood sugar by eating or drinking something that has 15 grams of carbohydrate. These should be quick-sugar foods. Check your blood sugar level again 15 minutes after having a quick-sugar food to make sure your level is getting back to your target range. Here are examples of quick-sugar foods that have 15 grams of carbohydrate:  · 3 to 4 glucose tablets  · 1 tube of glucose gel  · Hard candy (such as 3 Jolly Ranchers or 5 to 7 Life Savers)  · 1 tablespoon honey  · 2 tablespoons of raisins  · ½ cup to ¾ cup (4 to 6 ounces) of fruit juice or regular (not diet) soda  · 1 tablespoon of sugar  · 1 cup of fat-free milk  If you have problems with severe low blood sugar, someone else may have to give you a shot of glucagon. This is a hormone that raises blood sugar levels quickly. How can you prevent low blood sugar?   You can take steps to prevent low blood sugar. · Follow your treatment plan. Take your insulin or other diabetes medicine exactly as your doctor prescribed it. Talk with your doctor if you're having low blood sugar often. Your medicine may need to be adjusted if it's causing your low blood sugar. · Check your blood sugar levels often. This helps you find early changes before an emergency happens. · Keep a quick-sugar food with you in case your blood sugar level drops low. · Eat small meals more often so that you don't get too hungry between meals. Don't skip meals. · Balance extra exercise with eating more. Check your blood sugar and learn how it changes after exercise. If your blood sugar stays at a normal level, you may not need to eat after you exercise. · Limit how much alcohol you drink. Alcohol can make low blood sugar go even lower. Don't drink alcohol if you have problems recognizing the early signs of low blood sugar. · Keep a diary of your symptoms. This helps you learn when changes in your body may signal low blood sugar. And keep track of how often you have low blood sugar, including when you last ate and what you ate. This will help you learn what causes your blood sugar to drop. · Learn about diabetes and low blood sugar. Support groups or a diabetes education center can help you understand how medicines, diet, and exercise affect your blood sugar levels. Since low blood sugar levels can quickly become an emergency, be sure to wear medical alert jewelry, such as a medical alert bracelet. This is to let people know you have diabetes so they can get help for you. You can buy this at most drugstores. And make sure your family, friends, and coworkers know the symptoms of low blood sugar. Teach them what to do to get your sugar level up. Follow-up care is a key part of your treatment and safety. Be sure to make and go to all appointments, and call your doctor if you are having problems.  It's also a good idea to know your test results and keep a list of the medicines you take. Where can you learn more? Go to http://too-amber.info/. Enter L478 in the search box to learn more about \"Learning About Low Blood Sugar (Hypoglycemia) in Diabetes. \"  Current as of: May 23, 2016  Content Version: 11.1  © 6792-6192 [employer]. Care instructions adapted under license by [facility] (which disclaims liability or warranty for this information). If you have questions about a medical condition or this instruction, always ask your healthcare professional. [address] any warranty or liability for your use of this information.

## 2017-03-21 NOTE — ED PROVIDER NOTES
HPI Comments: Fletcher Alamo is a 78 y.o. male with PMhx significant for CAD, DM, paroxysmal ventricular tachycardia, CHF, atrial fibrillation (s/p ablation) and kidney CA who presents via EMS to the ED for evaluation of hypoglycemia. Pt states that he took his regularly prescribed dose of NPH this morning. He states that at 1100 he followed up with his PCP without eating prior to evaluation as he was supposed to be in a fasting state. He states at that time his blood glucose was tested and resulted in the 20's for which he was sent to the ED for evaluation. He was given OJ and oral glucose in addition to being started on IV D10 en route with EMS with glucose of 78. On exam, pt is without any physical complaints and specifically denies any CP, SOB, and cough. PCP: Andreas Kwong MD    There are no other complaints, changes or physical findings at this time. The history is provided by the patient. No  was used.         Past Medical History:   Diagnosis Date    CAD (coronary artery disease)     Cancer ([facility] [address]     kidney cancer diagnosed Feb 2017    Chronic systolic heart failure (HCC)     Coronary atherosclerosis of native coronary artery     Diabetes ([facility] [facility]     Heart failure ([facility] [facility]     Other acute and subacute form of ischemic heart disease     Other ill-defined conditions(799.89)     elevated cholesterol    Paroxysmal ventricular tachycardia (HCC)     Presence of biventricular automatic cardioverter/defibrillator (AICD) 5/2/2016 5/2/16 [employer] upgrade to biventricular AICD implant    S/P ablation of atrial fibrillation 1/8/2016       Past Surgical History:   Procedure Laterality Date    CARDIAC SURG PROCEDURE UNLIST  2005    CABG    HX PACEMAKER           Family History:   Problem Relation Age of Onset    Heart Disease Mother     Hypertension Mother     Diabetes Mother     Diabetes Father     Heart Disease Father     Hypertension Father        Social History Social History    Marital status:      Spouse name: N/A    Number of children: N/A    Years of education: N/A     Occupational History    Not on file. Social History Main Topics    Smoking status: Former Smoker     Quit date: 1/8/2005    Smokeless tobacco: Former User     Quit date: 4/9/2011    Alcohol use No    Drug use: No    Sexual activity: Yes     Partners: Female     Other Topics Concern    Not on file     Social History Narrative         ALLERGIES: Review of patient's allergies indicates no known allergies. Review of Systems   Constitutional: Negative. Negative for appetite change, chills, fatigue and fever. HENT: Negative. Negative for congestion, rhinorrhea, sinus pressure and sore throat. Eyes: Negative. Respiratory: Negative. Negative for cough, choking, chest tightness, shortness of breath and wheezing. Cardiovascular: Negative. Negative for chest pain, palpitations and leg swelling. Gastrointestinal: Negative for abdominal pain, constipation, diarrhea, nausea and vomiting. Endocrine: Negative. Genitourinary: Negative. Negative for difficulty urinating, dysuria, flank pain and urgency. Musculoskeletal: Negative. Skin: Negative. Neurological: Negative. Negative for dizziness, speech difficulty, weakness, light-headedness, numbness and headaches. Psychiatric/Behavioral: Negative. All other systems reviewed and are negative. Vitals:    03/21/17 1548   BP: 119/65   Pulse: 68   Resp: 16   SpO2: 100%   Weight: 93.4 kg (206 lb)   Height: 5' 9\" (1.753 m)            Physical Exam   Constitutional: He is oriented to person, place, and time. He appears well-developed and well-nourished. No distress. HENT:   Head: Normocephalic and atraumatic. Mouth/Throat: Oropharynx is clear and moist. No oropharyngeal exudate. Eyes: Conjunctivae and EOM are normal. Pupils are equal, round, and reactive to light. Neck: Normal range of motion.  Neck supple. No JVD present. No tracheal deviation present. Cardiovascular: Normal rate, regular rhythm, normal heart sounds and intact distal pulses. No murmur heard. Pulmonary/Chest: Effort normal and breath sounds normal. No stridor. No respiratory distress. He has no wheezes. He has no rales. He exhibits no tenderness. Abdominal: Soft. He exhibits no distension. There is no tenderness. There is no rebound and no guarding. Musculoskeletal: Normal range of motion. He exhibits no edema or tenderness. Neurological: He is alert and oriented to person, place, and time. No cranial nerve deficit. No focal motor or sensory deficits    Skin: Skin is warm and dry. He is not diaphoretic. Psychiatric: He has a normal mood and affect. His behavior is normal.   Nursing note and vitals reviewed. MDM  Number of Diagnoses or Management Options  Hypoglycemia:   Diagnosis management comments: DDx: hypoglycemic event        Amount and/or Complexity of Data Reviewed  Clinical lab tests: ordered and reviewed  Review and summarize past medical records: yes    Patient Progress  Patient progress: stable    ED Course       Procedures     PROGRESS NOTE:  5:10 PM  Pt's glucose 146 following PO in the ED. Anticipate discharge. LABORATORY TESTS:  Recent Results (from the past 12 hour(s))   GLUCOSE, POC    Collection Time: 03/21/17  3:39 PM   Result Value Ref Range    Glucose (POC) 113 (H) 65 - 100 mg/dL    Performed by Tara Christensen    GLUCOSE, POC    Collection Time: 03/21/17  5:05 PM   Result Value Ref Range    Glucose (POC) 146 (H) 65 - 100 mg/dL    Performed by [address]:  1. Hypoglycemia        PLAN:  1. Continue your prescriptions as prescribed.    2.   Follow-up Information     Follow up With Details Comments [address] Natalie Siddiqui MD  As needed [place] [address]  [phone]          Return to ED if worse     Discharge Note:  5:30 PM  The patient has been re-evaluated and is ready for discharge. Reviewed available results with patient. Counseled patient on diagnosis and care plan. Patient has expressed understanding, and all questions have been answered. Patient agrees with plan and agrees to follow up as recommended, or return to the ED if their symptoms worsen. Discharge instructions have been provided and explained to the patient, along with reasons to return to the ED. Attestation: This note is prepared by Aubrie Roman, acting as Scribe for Tommie Nicholas, [address] [place] [address], DO: The scribe's documentation has been prepared under my direction and personally reviewed by me in its entirety. I confirm that the note above accurately reflects all work, treatment, procedures, and medical decision making performed by me.

## 2017-03-21 NOTE — ED NOTES
Assisted pt to family, family here to take patient home, pt given discharge instructions by MD states feel better.

## 2017-03-21 NOTE — ED NOTES
Patient arrived via EMS. [facility] getting ready to do a \"bone treatment\" when patient was noted in waiting room to have altered mental status, diaphoretic. FSBS revealed sugar of 29. Patient given OJ and D5 and recheck sugar was 49. EMS started IV D10 and repeat sugar was 78. On arrival in ED, FSBS was 118. Patient alert and oriented at this time. Patient placed in bed and placed on monitor x 2. Patient denies any pain at this time. Patient states he took his 70/30 insulin today but has not eaten.

## 2017-05-11 NOTE — ED NOTES
Ambulated patient aprox 250 feet. Patient dis become slightly SOB at the end. Patients o2 saturation maintained between 93-97% while on room air.

## 2017-05-11 NOTE — PROGRESS NOTES
Pt presented for device check with noticeable WILSON with walking. Documented 20 lbs weight gain, decreased breath sounds posterior left lobe, fine rales upper lobes bilaterally. He brought trends of bps and weights from oncologist.  His bps are running 100-110s/60-80s. Weight has progressively increased over the last few weeks. Discussed our limited outpatient diuresis options given his low bps, elevated bun/creat and significant decompensation,  recommended he go to ED. Pt verbalizes understanding and is in agreement with the plan.

## 2017-05-11 NOTE — ED NOTES
Patient arrives to ED with complaints of SOB that has been worse than usual for the past 3 weeks. Patient states that it has gotten even worse the past few day. Patient also reports a 30 pound weight gain in the last 3 days. Bilateral lower extremity 3+ pitting edema noted. Will continue to monitor. Bed locked and in lowest position.

## 2017-05-11 NOTE — ED PROVIDER NOTES
HPI Comments: George Sheikh is a 78 y.o. M with PMHx significant for AICD / CAD / DM / Paroxysmal ventricular tachycardia / Chronic systolic heart failure / Coronary atherosclerosis / Kidney CA who presents ambulatory to ED [facility] ED c/o SOB x 3 weeks with abnormal weight gain (20 lbs) in the past 3 days. Pt was seen today by Dr. Josh Marsh who recommended that pt visit ED. He has routinely been compliant with his 1 mg Bumex at home including the last few days, but reports non-compliance today. Pt reports former tobacco use until 2005 and hx of CABG in 2005. He denies using any regular use of O2 at home or any allergies to medications. Pt specifically denies any pain, nausea, vomiting or any recent abnormal foods or drinks. PCP: Alycia Adorno MD  Cardiology: Huma Zuleta MD    There are no other complaints, changes, or physical findings at this time. The history is provided by the patient.         Past Medical History:   Diagnosis Date    CAD (coronary artery disease)     Cancer ([facility] [address]     kidney cancer diagnosed Feb 2017    Chronic systolic heart failure (HCC)     Coronary atherosclerosis of native coronary artery     Diabetes ([facility] [address]     Heart failure (HCC)     Other acute and subacute form of ischemic heart disease     Other ill-defined conditions     elevated cholesterol    Paroxysmal ventricular tachycardia (HCC)     Presence of biventricular automatic cardioverter/defibrillator (AICD) 5/2/2016 5/2/16 [employer] upgrade to biventricular AICD implant    S/P ablation of atrial fibrillation 1/8/2016       Past Surgical History:   Procedure Laterality Date    CARDIAC SURG PROCEDURE UNLIST  2005    CABG    HX PACEMAKER           Family History:   Problem Relation Age of Onset    Heart Disease Mother     Hypertension Mother     Diabetes Mother     Diabetes Father     Heart Disease Father     Hypertension Father        Social History     Social History    Marital status:  Spouse name: N/A    Number of children: N/A    Years of education: N/A     Occupational History    Not on file. Social History Main Topics    Smoking status: Former Smoker     Quit date: 1/8/2005    Smokeless tobacco: Former User     Quit date: 4/9/2011    Alcohol use No    Drug use: No    Sexual activity: Yes     Partners: Female     Other Topics Concern    Not on file     Social History Narrative         ALLERGIES: Review of patient's allergies indicates no known allergies. Review of Systems   Constitutional: Positive for unexpected weight change. Negative for activity change, appetite change, chills and fever. HENT: Negative for congestion. Eyes: Negative for pain and visual disturbance. Respiratory: Positive for shortness of breath. Negative for cough. Cardiovascular: Negative for chest pain. Gastrointestinal: Negative for abdominal pain, diarrhea, nausea and vomiting. Genitourinary: Negative for dysuria. Musculoskeletal: Negative for back pain. Skin: Negative for rash. Neurological: Negative for headaches. All other systems reviewed and are negative. Vitals:    05/11/17 1314 05/11/17 1330 05/11/17 1358 05/11/17 1400   BP: 124/67 133/80  129/80   Pulse: 70 70 70 70   Resp: 20 21 23 19   SpO2: 100% 97% 95% 97%   Weight:       Height:                Physical Exam   Constitutional: He is oriented to person, place, and time. He appears well-developed and well-nourished. HENT:   Head: Normocephalic and atraumatic. Mouth/Throat: Oropharynx is clear and moist.   Eyes: Conjunctivae and EOM are normal. Pupils are equal, round, and reactive to light. Right eye exhibits no discharge. Left eye exhibits no discharge. Neck: Normal range of motion. Neck supple. Cardiovascular: Normal rate and normal heart sounds. No murmur heard. Pitting 2+ edema up to lower abdomen   Pulmonary/Chest: Accessory muscle usage present. Tachypnea noted. No respiratory distress.  He has no wheezes. He has rales (BL). Abdominal: Soft. Bowel sounds are normal. He exhibits no distension. There is no tenderness. Musculoskeletal: Normal range of motion. Neurological: He is alert and oriented to person, place, and time. No cranial nerve deficit. He exhibits normal muscle tone. Skin: Skin is warm and dry. No rash noted. He is not diaphoretic. Nursing note and vitals reviewed. MDM  Number of Diagnoses or Management Options  Hypervolemia, unspecified hypervolemia type:   Shortness of breath:   Diagnosis management comments: Acute fluid overload, currently hemodynamically stable. Amount and/or Complexity of Data Reviewed  Clinical lab tests: ordered and reviewed  Tests in the radiology section of CPT®: ordered and reviewed  Tests in the medicine section of CPT®: ordered and reviewed  Review and summarize past medical records: yes  Discuss the patient with other providers: yes Twyla Mitchell MD, Claudia Amezcua MD)  Independent visualization of images, tracings, or specimens: yes    Patient Progress  Patient progress: stable    ED Course       Procedures    EKG interpretation: (Preliminary)  1043  Rhythm: 100% paced; and regular . Rate (approx.): 70 bpm; Axis: normal; UT interval: normal; QRS interval: normal ; ST/T wave: normal; Other findings: normal.    CONSULT NOTE:   1:24 PM  Riley Mars MD spoke with Twyla Mitchell MD,   Specialty: Cardiology  Discussed pt's hx, disposition, and available diagnostic and imaging results. Reviewed care plans. Consultant agrees with plans as outlined. Consultant recommends admission to hospitalist.   Written by Gabriel Mruphy. [facility] ED Scribe, as dictated by Riley Mars MD.    CONSULT NOTE:   1:28 PM  Riley Mars MD spoke with Claudia Amezcua MD,   Specialty: Hospitalist  Discussed pt's hx, disposition, and available diagnostic and imaging results. Reviewed care plans.  Consultant said patient does not meet criteria for admission. Written by Fadumo Delgado. Manisha Epps, ED Scribe, as dictated by Al Kaufman MD.    PROGRESS NOTE:  2:07 PM  Pt reevaluated. Pt's SpO2 was 97% and he put out 2 L of fluid. Ambulatory saturation remains normal at 97%. Will send patient home with increased Bumex for three days. Written by Fadumo Delgado. Manisha Epps, ED Scribe, as dictated by Al Kaufman MD    Patient Vitals for the past 12 hrs:   Pulse Resp BP SpO2   05/11/17 1358 70 23 - 95 %   05/11/17 1330 70 21 133/80 97 %   05/11/17 1314 70 20 124/67 100 %   05/11/17 1112 70 19 125/83 98 %   05/11/17 1100 70 18 122/68 95 %   05/11/17 1034 - - - 98 %   05/11/17 1029 70 - 128/64 -   05/11/17 1028 70 23 128/64 97 %   05/11/17 1011 70 19 (!) 128/107 100 %   05/11/17 1007 - - (!) 128/107 -       LABORATORY TESTS:  Recent Results (from the past 12 hour(s))   CBC WITH AUTOMATED DIFF    Collection Time: 05/11/17 10:37 AM   Result Value Ref Range    WBC 10.6 4.1 - 11.1 K/uL    RBC 4.13 4.10 - 5.70 M/uL    HGB 11.0 (L) 12.1 - 17.0 g/dL    HCT 35.1 (L) 36.6 - 50.3 %    MCV 85.0 80.0 - 99.0 FL    MCH 26.6 26.0 - 34.0 PG    MCHC 31.3 30.0 - 36.5 g/dL    RDW 18.3 (H) 11.5 - 14.5 %    PLATELET 155 668 - 038 K/uL    NEUTROPHILS 73 32 - 75 %    LYMPHOCYTES 17 12 - 49 %    MONOCYTES 9 5 - 13 %    EOSINOPHILS 1 0 - 7 %    BASOPHILS 0 0 - 1 %    ABS. NEUTROPHILS 7.8 1.8 - 8.0 K/UL    ABS. LYMPHOCYTES 1.8 0.8 - 3.5 K/UL    ABS. MONOCYTES 0.9 0.0 - 1.0 K/UL    ABS. EOSINOPHILS 0.1 0.0 - 0.4 K/UL    ABS.  BASOPHILS 0.0 0.0 - 0.1 K/UL   PROTHROMBIN TIME + INR    Collection Time: 05/11/17 10:37 AM   Result Value Ref Range    INR 1.5 (H) 0.9 - 1.1      Prothrombin time 14.9 (H) 9.0 - 51.4 sec   METABOLIC PANEL, COMPREHENSIVE    Collection Time: 05/11/17 11:11 AM   Result Value Ref Range    Sodium 137 136 - 145 mmol/L    Potassium 4.0 3.5 - 5.1 mmol/L    Chloride 98 97 - 108 mmol/L    CO2 31 21 - 32 mmol/L    Anion gap 8 5 - 15 mmol/L    Glucose 240 (H) 65 - 100 mg/dL    BUN 15 6 - 20 MG/DL    Creatinine 1.65 (H) 0.70 - 1.30 MG/DL    BUN/Creatinine ratio 9 (L) 12 - 20      GFR est AA 50 (L) >60 ml/min/1.73m2    GFR est non-AA 41 (L) >60 ml/min/1.73m2    Calcium 9.0 8.5 - 10.1 MG/DL    Bilirubin, total 2.1 (H) 0.2 - 1.0 MG/DL    ALT (SGPT) 16 12 - 78 U/L    AST (SGOT) 16 15 - 37 U/L    Alk. phosphatase 210 (H) 45 - 117 U/L    Protein, total 7.2 6.4 - 8.2 g/dL    Albumin 3.0 (L) 3.5 - 5.0 g/dL    Globulin 4.2 (H) 2.0 - 4.0 g/dL    A-G Ratio 0.7 (L) 1.1 - 2.2     PRO-BNP    Collection Time: 05/11/17 11:11 AM   Result Value Ref Range    NT pro-BNP 6973 (H) 0 - 125 PG/ML   TROPONIN I    Collection Time: 05/11/17 11:11 AM   Result Value Ref Range    Troponin-I, Qt. 0.08 (H) <0.05 ng/mL       IMAGING RESULTS:  CTA CHEST W OR W WO CONT   Final Result   EXAM: CTA CHEST W OR W WO CONT     INDICATION: chest mass, dyspnea, hypoxia. History of clear cell renal cell  carcinoma.     COMPARISON: Contemporaneous chest x-ray and CTA thorax 12/7/2016.     CONTRAST: 80 mL of Isovue-370.     TECHNIQUE:   Precontrast  images were obtained to localize the volume for acquisition. Multislice helical CT arteriography was performed from the diaphragm to the  thoracic inlet during uneventful rapid bolus intravenous contrast  administration. Lung and soft tissue windows were generated. Coronal and  sagittal images were generated and 3D post processing consisting of coronal  maximum intensity images was performed. CT dose reduction was achieved through  use of a standardized protocol tailored for this examination and automatic  exposure control for dose modulation.     FINDINGS:     There is mediastinal adenopathy. Corresponding with the density seen in the  medial left apex on chest x-ray there is a 3.2 x 3.9 cm prevascular node  previously measuring 2.7 x 3.2 cm (series 2, image 87). A right periesophageal  node measures 9 x 13 mm, previously 8 x 16 mm (2, 87).  A subcarinal node  measures 15 x 23 mm, previously 12 x 17 mm (2, 60). A periesophageal node just  above the hiatus measures 16 x 25 mm, previously 15 x 21 mm (2, 40). A  periesophageal node at the hiatus measures 19 x 21 mm, previously 20 x 21 mm (2,  17).    There are small bilateral pleural effusions with overlying atelectasis. There is  a 7 mm subpleural nodule in the right lower lobe (2, 41). There is a small area  of patchy airspace infiltrate in the left lower lobe. The lungs are otherwise  clear. .     The pulmonary arteries are well enhanced and no pulmonary emboli are identified. Note is made of some areas of mixed opacification of lower lobe pulmonary  arteries which do not conform to expected pattern of emboli.      There is no hilar adenopathy. The heart is enlarged with reflux seen into the  hepatic veins. Coronary artery calcifications are noted and possible changes of  CABG are seen. The aorta is atherosclerotic but otherwise enhances normally  without evidence of aneurysm or dissection.     Visualized upper abdomen shows ascites with no acute abnormality otherwise. Chest wall structures are unremarkable apart from degenerative spine change.     IMPRESSION  IMPRESSION:   1. No evident pulmonary emboli. 2. Cardiomegaly with contrast reflux into the hepatic veins likely reflecting  right-sided heart failure. 3. Ascites. 4. Additional incidental findings as above. XR CHEST PORT   Final Result   EXAM: XR CHEST PORT     INDICATION: Chest Pain, shortness of breath x3 weeks has gained 30 pounds     COMPARISON: 5-2-16     FINDINGS: A portable AP radiograph of the chest was obtained at 1044 hours. The  patient is on a cardiac monitor. Patient status post median sternotomy. There is  mild cardiomegaly. The ICD remains in place. The lungs are clear no pneumonia or  pulmonary edema.     Importantly, there is a mass lesion in the left side the superior mediastinum  above the level of aortic arch.  This has developed in the interval and measures  at least 5 cm in transverse diameter.     IMPRESSION  IMPRESSION:  1. Apparent new left superior mediastinal mass or adenopathy. This causes  obscuration of the aortic arch and widening of the superior mediastinum. This  could be better evaluated with CT of the chest is clinically warranted. 2. No pulmonary edema. MEDICATIONS GIVEN:  Medications   nitroglycerin (NITROBID) 2 % ointment 0.5 Inch (0.5 Inches Topical Given 5/11/17 1029)   bumetanide (BUMEX) injection 1 mg (1 mg IntraVENous Given 5/11/17 1029)   0.9% sodium chloride infusion (0 mL/hr IntraVENous IV Completed 5/11/17 1317)   sodium chloride (NS) flush 10 mL (10 mL IntraVENous Given 5/11/17 1238)   iopamidol (ISOVUE-370) 76 % injection 100 mL (80 mL IntraVENous Given 5/11/17 1238)       IMPRESSION:  1. Shortness of breath    2. Hypervolemia, unspecified hypervolemia type        PLAN:  1. Current Discharge Medication List        2. Follow-up Information     Follow up With Details Comments Contact Info    [facility] EMERGENCY DEPT  If symptoms worsen [address] [facility] [address] [facility], [address] [place] [address]  Raymon Calloway [address]  [phone]      Yuan Bacon MD Schedule an appointment as soon as possible for a visit in 3 days for symptom recheck. [address] [facility]  [phone]          Return to ED if worse     DISCHARGE NOTE:  2:18 PM  The patient's results have been reviewed with family and/or caregiver. They verbally convey their understanding and agreement of the patient's signs, symptoms, diagnosis, treatment, and prognosis. They additionally agree to follow up as recommended in the discharge instructions or to return to the Emergency Room should the patient's condition change prior to their follow-up appointment. The family and/or caregiver verbally agrees with the care-plan and all of their questions have been answered.  The discharge instructions have also been provided to the them along with educational information regarding the patient's diagnosis and a list of reasons why the patient would want to return to the ER prior to their follow-up appointment should their condition change. Written by Vanesa Stratton. Aniket Ramirez ED Scribe, as dictated by Charles Dwyer MD.        Attestations: This note is prepared by Vanesa Stratton. Candy, acting as Scribe for Charles Dwyer MD.    Charles Dwyer MD: The scribe's documentation has been prepared under my direction and personally reviewed by me in its entirety. I confirm that the note above accurately reflects all work, treatment, procedures, and medical decision making performed by me.

## 2017-05-11 NOTE — ED NOTES
Patient DC per Dr Ashley Sun. No questions or concerns at this time. Patient verbalized DC instructions. This nurse wheel chaired patient out to private vehicle where visitor was driving patient home.

## 2017-05-11 NOTE — PROGRESS NOTES
See device report - BSC BiV ICD in office device check, next check in office with Dr. Brittny Ortiz on 5/23/17. Weight up 20lbs, SOB with exertion, 2+ pitting edema on BLE, decreased lung sounds on left lower lung fields.   Sent to ED for evaluation per Codie Parry NP.

## 2017-05-11 NOTE — PROGRESS NOTES
Pharmacy Clarification of Prior to Admission Medication Regimen     The patient was interviewed regarding clarification of the prior to admission medication regimen. Son was present in room and obtained permission from patient to discuss drug regimen with visitor(s) present. Patient was questioned regarding use of any other inhalers, topical products, over the counter medications, herbal medications, vitamin products or ophthalmic/nasal/otic medication use. Information Obtained From: Patient, RX Query, Prior medication list from discharge papers, Outpatient pharmacy, PCP's office    Pertinent Pharmacy Findings: NONE    PTA medication list was corrected to the following:     Prior to Admission Medications   Prescriptions Last Dose Informant Patient Reported? Taking?   amiodarone (CORDARONE) 200 mg tablet 5/10/2017 at Unknown time Transfer Papers No Yes   Sig: Take 1 Tab by mouth daily. apixaban (ELIQUIS) 5 mg tablet 5/10/2017 at Unknown time Other No Yes   Sig: Take 1 Tab by mouth two (2) times a day. atorvastatin (LIPITOR) 20 mg tablet 5/10/2017 at Unknown time Transfer Papers Yes Yes   Sig: Take 20 mg by mouth nightly. bumetanide (BUMEX) 1 mg tablet 5/10/2017 at Unknown time Transfer Papers No Yes   Sig: TAKE 1 TABLET EVERY DAY   carvedilol (COREG) 25 mg tablet 5/10/2017 at Unknown time Transfer Papers No Yes   Sig: TAKE 1 TABLET TWICE DAILY   insulin NPH/insulin regular (NOVOLIN 70/30, HUMULIN 70/30) 100 unit/mL (70-30) injection 5/10/2017 at Unknown time Other Yes Yes   Si Units by SubCUTAneous route Daily (before breakfast). Patients takes 50 units before breakfast and 35 units before dinner   insulin NPH/insulin regular (NOVOLIN 70/30, HUMULIN 70/30) 100 unit/mL (70-30) injection 5/10/2017 at Unknown time Other Yes Yes   Si Units by SubCUTAneous route Daily (before dinner).  Patients takes 50 units before breakfast and 35 units before dinner   lisinopril (PRINIVIL, ZESTRIL) 10 mg tablet 5/10/2017 at Unknown time Transfer Papers No Yes   Sig: TAKE 1 TABLET EVERY DAY   spironolactone (ALDACTONE) 25 mg tablet 5/10/2017 at Unknown time Transfer Papers No Yes   Sig: TAKE 1 TABLET EVERY DAY      Facility-Administered Medications: None          Thank you,  Cara Syed, [facility]  Medication History Pharmacy Technician

## 2017-05-11 NOTE — DISCHARGE INSTRUCTIONS
Continue your bumex every morning. For the next three days, take a double dose (2mg) every morning. If your symptoms worsen, or you develop any new symptoms, please return to the ER. Learning About Fluid Overload  What is fluid overload? Fluid overload means that your body has too much water. The extra fluid in your body can raise your blood pressure and force your heart to work harder. It can also make it hard for you to breathe. Most of your body is made up of water. The body uses minerals like sodium and potassium to help organs such as your heart, kidneys, and liver balance how much water you need. For example, the heart pumps blood to move water around the body. And the kidneys work to get rid of the water that the body doesn't need. Health conditions like kidney disease, heart failure, and cirrhosis can cause fluid overload. Other things can cause extra fluid to build up. IV fluids, some medicines, too much salt (sodium) from food, and certain medical treatments can sometimes cause this fluid increase. What are the symptoms? Some of the most common symptoms are:  · Gaining weight over a short period of time. · Swelling in the ankles or legs. · Shortness of breath. · More frequent urination. How is it treated? The goal of treatment is to remove the extra fluid in your body. Your treatment will depend on the cause. Your doctor may:  · Give you medicines, such as diuretics (also called \"water pills\"). They help your body get rid of the extra fluid. · Restrict your fluid or salt intake. Follow-up care is a key part of your treatment and safety. Be sure to make and go to all appointments, and call your doctor if you are having problems. It's also a good idea to know your test results and keep a list of the medicines you take. Where can you learn more? Go to http://too-amber.info/.   Enter O110 in the search box to learn more about \"Learning About Fluid Overload. \"  Current as of: October 19, 2016  Content Version: 11.2  © 3623-5635 [employer], [facility]. Care instructions adapted under license by [employer] (which disclaims liability or warranty for this information). If you have questions about a medical condition or this instruction, always ask your healthcare professional. [place] [address] any warranty or liability for your use of this information.

## 2017-05-12 NOTE — PROGRESS NOTES
Patient called in regards to recent ED visit, states he was in the ED yesterday. Patient states he was told he needed to be seen in the office on Monday 5/15/17 d/t a 30lb weight gain in 3 days. Send to [facility] for an appointment. Advised patient to call this NN back by the end of the day if he did not receive a return call today for an appointment today . Patient verbalized understanding.

## 2017-05-15 NOTE — PATIENT INSTRUCTIONS
1. Increase Bumex to 1mg twice a day    2. Reduce Lisinopril to 5mg daily (You can break your 10mg tabs in 1/2 to use them up)    3. I will see you Thursday 5/18/17 when you are here with Dr Lissy Steele    4. I want you to go for blood work and chest x ray on 5/24/17 for your visit with me on Friday 5/26/17    5. Keep a daily log of your weight, in the AM at the same time after urinating. CALL IF YOUR WEIGHT IS GOING UP    6. Call me if your having any muscle cramping or dizziness. Low Sodium Diet (2,000 Milligram): Care Instructions  Your Care Instructions  Too much sodium causes your body to hold on to extra water. This can raise your blood pressure and force your heart and kidneys to work harder. In very serious cases, this could cause you to be put in the hospital. It might even be life-threatening. By limiting sodium, you will feel better and lower your risk of serious problems. The most common source of sodium is salt. People get most of the salt in their diet from canned, prepared, and packaged foods. Fast food and restaurant meals also are very high in sodium. Your doctor will probably limit your sodium to less than 2,000 milligrams (mg) a day. This limit counts all the sodium in prepared and packaged foods and any salt you add to your food. Follow-up care is a key part of your treatment and safety. Be sure to make and go to all appointments, and call your doctor if you are having problems. It's also a good idea to know your test results and keep a list of the medicines you take. How can you care for yourself at home? Read food labels  · Read labels on cans and food packages. The labels tell you how much sodium is in each serving. Make sure that you look at the serving size. If you eat more than the serving size, you have eaten more sodium. · Food labels also tell you the Percent Daily Value for sodium. Choose products with low Percent Daily Values for sodium.   · Be aware that sodium can come in forms other than salt, including monosodium glutamate (MSG), sodium citrate, and sodium bicarbonate (baking soda). MSG is often added to Asian food. When you eat out, you can sometimes ask for food without MSG or added salt. Buy low-sodium foods  · Buy foods that are labeled \"unsalted\" (no salt added), \"sodium-free\" (less than 5 mg of sodium per serving), or \"low-sodium\" (less than 140 mg of sodium per serving). Foods labeled \"reduced-sodium\" and \"light sodium\" may still have too much sodium. Be sure to read the label to see how much sodium you are getting. · Buy fresh vegetables, or frozen vegetables without added sauces. Buy low-sodium versions of canned vegetables, soups, and other canned goods. Prepare low-sodium meals  · Cut back on the amount of salt you use in cooking. This will help you adjust to the taste. Do not add salt after cooking. One teaspoon of salt has about 2,300 mg of sodium. · Take the salt shaker off the table. · Flavor your food with garlic, lemon juice, onion, vinegar, herbs, and spices. Do not use soy sauce, lite soy sauce, steak sauce, onion salt, garlic salt, celery salt, mustard, or ketchup on your food. · Use low-sodium salad dressings, sauces, and ketchup. Or make your own salad dressings and sauces without adding salt. · Use less salt (or none) when recipes call for it. You can often use half the salt a recipe calls for without losing flavor. Other foods such as rice, pasta, and grains do not need added salt. · Rinse canned vegetables, and cook them in fresh water. This removes some--but not all--of the salt. · Avoid water that is naturally high in sodium or that has been treated with water softeners, which add sodium. Call your local water company to find out the sodium content of your water supply. If you buy bottled water, read the label and choose a sodium-free brand. Avoid high-sodium foods  · Avoid eating:  ¨ Smoked, cured, salted, and canned meat, fish, and poultry.   ¨ Ham, vasquez, hot dogs, and luncheon meats. ¨ Regular, hard, and processed cheese and regular peanut butter. ¨ Crackers with salted tops, and other salted snack foods such as pretzels, chips, and salted popcorn. ¨ Frozen prepared meals, unless labeled low-sodium. ¨ Canned and dried soups, broths, and bouillon, unless labeled sodium-free or low-sodium. ¨ Canned vegetables, unless labeled sodium-free or low-sodium. ¨ [employer] fries, pizza, tacos, and other fast foods. ¨ Pickles, olives, ketchup, and other condiments, especially soy sauce, unless labeled sodium-free or low-sodium. Where can you learn more? Go to http://too-amber.info/. Enter E063 in the search box to learn more about \"Low Sodium Diet (2,000 Milligram): Care Instructions. \"  Current as of: July 26, 2016  Content Version: 11.2  © 4800-6941 [employer]. Care instructions adapted under license by [employer] (which disclaims liability or warranty for this information). If you have questions about a medical condition or this instruction, always ask your healthcare professional. [place] [address] any warranty or liability for your use of this information. DASH Diet: Care Instructions  Your Care Instructions  The DASH diet is an eating plan that can help lower your blood pressure. DASH stands for Dietary Approaches to Stop Hypertension. Hypertension is high blood pressure. The DASH diet focuses on eating foods that are high in calcium, potassium, and magnesium. These nutrients can lower blood pressure. The foods that are highest in these nutrients are fruits, vegetables, low-fat dairy products, nuts, seeds, and legumes. But taking calcium, potassium, and magnesium supplements instead of eating foods that are high in those nutrients does not have the same effect. The DASH diet also includes whole grains, fish, and poultry.   The DASH diet is one of several lifestyle changes your doctor may recommend to lower your high blood pressure. Your doctor may also want you to decrease the amount of sodium in your diet. Lowering sodium while following the DASH diet can lower blood pressure even further than just the DASH diet alone. Follow-up care is a key part of your treatment and safety. Be sure to make and go to all appointments, and call your doctor if you are having problems. It's also a good idea to know your test results and keep a list of the medicines you take. How can you care for yourself at home? Following the DASH diet  · Eat 4 to 5 servings of fruit each day. A serving is 1 medium-sized piece of fruit, ½ cup chopped or canned fruit, 1/4 cup dried fruit, or 4 ounces (½ cup) of fruit juice. Choose fruit more often than fruit juice. · Eat 4 to 5 servings of vegetables each day. A serving is 1 cup of lettuce or raw leafy vegetables, ½ cup of chopped or cooked vegetables, or 4 ounces (½ cup) of vegetable juice. Choose vegetables more often than vegetable juice. · Get 2 to 3 servings of low-fat and fat-free dairy each day. A serving is 8 ounces of milk, 1 cup of yogurt, or 1 ½ ounces of cheese. · Eat 6 to 8 servings of grains each day. A serving is 1 slice of bread, 1 ounce of dry cereal, or ½ cup of cooked rice, pasta, or cooked cereal. Try to choose whole-grain products as much as possible. · Limit lean meat, poultry, and fish to 2 servings each day. A serving is 3 ounces, about the size of a deck of cards. · Eat 4 to 5 servings of nuts, seeds, and legumes (cooked dried beans, lentils, and split peas) each week. A serving is 1/3 cup of nuts, 2 tablespoons of seeds, or ½ cup of cooked beans or peas. · Limit fats and oils to 2 to 3 servings each day. A serving is 1 teaspoon of vegetable oil or 2 tablespoons of salad dressing. · Limit sweets and added sugars to 5 servings or less a week. A serving is 1 tablespoon jelly or jam, ½ cup sorbet, or 1 cup of lemonade.   · Eat less than 2,300 milligrams (mg) of sodium a day. If you limit your sodium to 1,500 mg a day, you can lower your blood pressure even more. Tips for success  · Start small. Do not try to make dramatic changes to your diet all at once. You might feel that you are missing out on your favorite foods and then be more likely to not follow the plan. Make small changes, and stick with them. Once those changes become habit, add a few more changes. · Try some of the following:  ¨ Make it a goal to eat a fruit or vegetable at every meal and at snacks. This will make it easy to get the recommended amount of fruits and vegetables each day. ¨ Try yogurt topped with fruit and nuts for a snack or healthy dessert. ¨ Add lettuce, tomato, cucumber, and onion to sandwiches. ¨ Combine a ready-made pizza crust with low-fat mozzarella cheese and lots of vegetable toppings. Try using tomatoes, squash, spinach, broccoli, carrots, cauliflower, and onions. ¨ Have a variety of cut-up vegetables with a low-fat dip as an appetizer instead of chips and dip. ¨ Sprinkle sunflower seeds or chopped almonds over salads. Or try adding chopped walnuts or almonds to cooked vegetables. ¨ Try some vegetarian meals using beans and peas. Add garbanzo or kidney beans to salads. Make burritos and tacos with mashed hopkins beans or black beans. Where can you learn more? Go to http://too-amber.info/. Enter O861 in the search box to learn more about \"DASH Diet: Care Instructions. \"  Current as of: March 23, 2016  Content Version: 11.2  © 1280-6867 [employer]. Care instructions adapted under license by [employer] (which disclaims liability or warranty for this information). If you have questions about a medical condition or this instruction, always ask your healthcare professional. [address]  any warranty or liability for your use of this information. 7. Follow low sodium diet.

## 2017-05-15 NOTE — MR AVS SNAPSHOT
Visit Information Date & Time Provider Department Dept. Phone Encounter #  
 5/15/2017  9:00 AM Vicente Park NP [facility] [phone] Your Appointments 5/25/2017  9:45 AM  
6 MONTH with Candy Cui MD  
[facility] [address] [facility]) Appt Note: . $40 cash; . $40 cash [id number] [facility]  
[phone] [id number] [facility] Upcoming Health Maintenance Date Due HEMOGLOBIN A1C Q6M 1942 LIPID PANEL Q1 1942 FOOT EXAM Q1 7/14/1952 MICROALBUMIN Q1 7/14/1952 EYE EXAM RETINAL OR DILATED Q1 7/14/1952 DTaP/Tdap/Td series (1 - Tdap) 7/14/1963 FOBT Q 1 YEAR AGE 50-75 7/14/1992 ZOSTER VACCINE AGE 60> 7/14/2002 GLAUCOMA SCREENING Q2Y 7/14/2007 Pneumococcal 65+ Low/Medium Risk (1 of 2 - PCV13) 7/14/2007 MEDICARE YEARLY EXAM 7/14/2007 INFLUENZA AGE 9 TO ADULT 8/1/2017 Allergies as of 5/15/2017  Review Complete On: 5/15/2017 By: Dennis Flaherty LPN No Known Allergies Current Immunizations  Never Reviewed No immunizations on file. Not reviewed this visit You Were Diagnosed With   
  
 Codes Comments Chronic systolic heart failure (HCC)    -  Primary ICD-10-CM: B97.33 ICD-9-CM: 428.22 Essential hypertension, benign     ICD-10-CM: I10 
ICD-9-CM: 401.1 WILSON (dyspnea on exertion)     ICD-10-CM: R06.09 
ICD-9-CM: 786.09 Vitals BP Pulse Resp Height(growth percentile) Weight(growth percentile) SpO2  
 116/60 (BP 1 Location: Right arm, BP Patient Position: Sitting) 70 16 5' 8\" (1.727 m) 223 lb 8 oz (101.4 kg) 96% BMI Smoking Status 33.98 kg/m2 Former Smoker Vitals History BMI and BSA Data Body Mass Index Body Surface Area 33.98 kg/m 2 2.21 m 2 Preferred Pharmacy Pharmacy Name Phone [facility] [address] [place] [address] [phone] Your Updated Medication List  
  
   
This list is accurate as of: 5/15/17  9:40 AM.  Always use your most recent med list.  
  
  
  
  
 amiodarone 200 mg tablet Commonly known as:  CORDARONE Take 1 Tab by mouth daily. apixaban 5 mg tablet Commonly known as:  Prasanna Purchase Take 1 Tab by mouth two (2) times a day. atorvastatin 20 mg tablet Commonly known as:  LIPITOR Take 20 mg by mouth nightly. bumetanide 1 mg tablet Commonly known as:  Doc Hagan Take 1 Tab by mouth two (2) times a day. carvedilol 25 mg tablet Commonly known as:  COREG  
TAKE 1 TABLET TWICE DAILY  
  
 * insulin NPH/insulin regular 100 unit/mL (70-30) injection Commonly known as:  NOVOLIN 70/30, HUMULIN 70/30  
50 Units by SubCUTAneous route Daily (before breakfast). Patients takes 50 units before breakfast and 35 units before dinner * insulin NPH/insulin regular 100 unit/mL (70-30) injection Commonly known as:  NOVOLIN 70/30, HUMULIN 70/30  
35 Units by SubCUTAneous route Daily (before dinner). Patients takes 50 units before breakfast and 35 units before dinner  
  
 lisinopril 5 mg tablet Commonly known as:  Minus Salk Take 1 Tab by mouth daily. Dose reduced azotemia and hypotension  
  
 spironolactone 25 mg tablet Commonly known as:  ALDACTONE  
TAKE 1 TABLET EVERY DAY  
  
 * Notice: This list has 2 medication(s) that are the same as other medications prescribed for you. Read the directions carefully, and ask your doctor or other care provider to review them with you. Prescriptions Sent to Pharmacy Refills  
 lisinopril (PRINIVIL, ZESTRIL) 5 mg tablet 6 Sig: Take 1 Tab by mouth daily. Dose reduced azotemia and hypotension Class: Normal  
 Pharmacy: [address] Ph #: [phone]  Route: Oral  
 bumetanide (BUMEX) 1 mg tablet 3  
 Sig: Take 1 Tab by mouth two (2) times a day. Class: Normal  
 Pharmacy: [address] Ph #: [phone] Route: Oral  
  
We Performed the Following AMB POC EKG ROUTINE W/ 12 LEADS, INTER & REP [77409 CPT(R)] To-Do List   
 05/25/2017 Lab:  MAGNESIUM   
  
 05/25/2017 Lab:  METABOLIC PANEL, BASIC   
  
 05/25/2017 Imaging:  XR CHEST PA LAT Patient Instructions 1. Increase Bumex to 1mg twice a day 2. Reduce Lisinopril to 5mg daily (You can break your 10mg tabs in 1/2 to use them up) 3. I will see you Thursday 5/18/17 when you are here with Dr Shira Martin 
 
4. I want you to go for blood work and chest x ray on 5/24/17 for your visit with me on Friday 5/26/17 
 
5. Keep a daily log of your weight, in the AM at the same time after urinating. CALL IF YOUR WEIGHT IS GOING UP 
 
6. Call me if your having any muscle cramping or dizziness. Low Sodium Diet (2,000 Milligram): Care Instructions Your Care Instructions Too much sodium causes your body to hold on to extra water. This can raise your blood pressure and force your heart and kidneys to work harder. In very serious cases, this could cause you to be put in the hospital. It might even be life-threatening. By limiting sodium, you will feel better and lower your risk of serious problems. The most common source of sodium is salt. People get most of the salt in their diet from canned, prepared, and packaged foods. Fast food and restaurant meals also are very high in sodium. Your doctor will probably limit your sodium to less than 2,000 milligrams (mg) a day. This limit counts all the sodium in prepared and packaged foods and any salt you add to your food. Follow-up care is a key part of your treatment and safety. Be sure to make and go to all appointments, and call your doctor if you are having problems.  It's also a good idea to know your test results and keep a list of the medicines you take. How can you care for yourself at home? Read food labels · Read labels on cans and food packages. The labels tell you how much sodium is in each serving. Make sure that you look at the serving size. If you eat more than the serving size, you have eaten more sodium. · Food labels also tell you the Percent Daily Value for sodium. Choose products with low Percent Daily Values for sodium. · Be aware that sodium can come in forms other than salt, including monosodium glutamate (MSG), sodium citrate, and sodium bicarbonate (baking soda). MSG is often added to Asian food. When you eat out, you can sometimes ask for food without MSG or added salt. Buy low-sodium foods · Buy foods that are labeled \"unsalted\" (no salt added), \"sodium-free\" (less than 5 mg of sodium per serving), or \"low-sodium\" (less than 140 mg of sodium per serving). Foods labeled \"reduced-sodium\" and \"light sodium\" may still have too much sodium. Be sure to read the label to see how much sodium you are getting. · Buy fresh vegetables, or frozen vegetables without added sauces. Buy low-sodium versions of canned vegetables, soups, and other canned goods. Prepare low-sodium meals · Cut back on the amount of salt you use in cooking. This will help you adjust to the taste. Do not add salt after cooking. One teaspoon of salt has about 2,300 mg of sodium. · Take the salt shaker off the table. · Flavor your food with garlic, lemon juice, onion, vinegar, herbs, and spices. Do not use soy sauce, lite soy sauce, steak sauce, onion salt, garlic salt, celery salt, mustard, or ketchup on your food. · Use low-sodium salad dressings, sauces, and ketchup. Or make your own salad dressings and sauces without adding salt. · Use less salt (or none) when recipes call for it. You can often use half the salt a recipe calls for without losing flavor. Other foods such as rice, pasta, and grains do not need added salt. · Rinse canned vegetables, and cook them in fresh water. This removes somebut not allof the salt. · Avoid water that is naturally high in sodium or that has been treated with water softeners, which add sodium. Call your local water company to find out the sodium content of your water supply. If you buy bottled water, read the label and choose a sodium-free brand. Avoid high-sodium foods · Avoid eating: ¨ Smoked, cured, salted, and canned meat, fish, and poultry. ¨ Ham, vasquez, hot dogs, and luncheon meats. ¨ Regular, hard, and processed cheese and regular peanut butter. ¨ Crackers with salted tops, and other salted snack foods such as pretzels, chips, and salted popcorn. ¨ Frozen prepared meals, unless labeled low-sodium. ¨ Canned and dried soups, broths, and bouillon, unless labeled sodium-free or low-sodium. ¨ Canned vegetables, unless labeled sodium-free or low-sodium. ¨ [employer] fries, pizza, tacos, and other fast foods. ¨ Pickles, olives, ketchup, and other condiments, especially soy sauce, unless labeled sodium-free or low-sodium. Where can you learn more? Go to http://too-amber.info/. Enter M570 in the search box to learn more about \"Low Sodium Diet (2,000 Milligram): Care Instructions. \" Current as of: July 26, 2016 Content Version: 11.2 © 2595-5307 [employer]. Care instructions adapted under license by [employer] (which disclaims liability or warranty for this information). If you have questions about a medical condition or this instruction, always ask your healthcare professional. [place] [address] any warranty or liability for your use of this information. DASH Diet: Care Instructions Your Care Instructions The DASH diet is an eating plan that can help lower your blood pressure. DASH stands for Dietary Approaches to Stop Hypertension. Hypertension is high blood pressure. The DASH diet focuses on eating foods that are high in calcium, potassium, and magnesium. These nutrients can lower blood pressure. The foods that are highest in these nutrients are fruits, vegetables, low-fat dairy products, nuts, seeds, and legumes. But taking calcium, potassium, and magnesium supplements instead of eating foods that are high in those nutrients does not have the same effect. The DASH diet also includes whole grains, fish, and poultry. The DASH diet is one of several lifestyle changes your doctor may recommend to lower your high blood pressure. Your doctor may also want you to decrease the amount of sodium in your diet. Lowering sodium while following the DASH diet can lower blood pressure even further than just the DASH diet alone. Follow-up care is a key part of your treatment and safety. Be sure to make and go to all appointments, and call your doctor if you are having problems. It's also a good idea to know your test results and keep a list of the medicines you take. How can you care for yourself at home? Following the DASH diet · Eat 4 to 5 servings of fruit each day. A serving is 1 medium-sized piece of fruit, ½ cup chopped or canned fruit, 1/4 cup dried fruit, or 4 ounces (½ cup) of fruit juice. Choose fruit more often than fruit juice. · Eat 4 to 5 servings of vegetables each day. A serving is 1 cup of lettuce or raw leafy vegetables, ½ cup of chopped or cooked vegetables, or 4 ounces (½ cup) of vegetable juice. Choose vegetables more often than vegetable juice. · Get 2 to 3 servings of low-fat and fat-free dairy each day. A serving is 8 ounces of milk, 1 cup of yogurt, or 1 ½ ounces of cheese. · Eat 6 to 8 servings of grains each day. A serving is 1 slice of bread, 1 ounce of dry cereal, or ½ cup of cooked rice, pasta, or cooked cereal. Try to choose whole-grain products as much as possible. · Limit lean meat, poultry, and fish to 2 servings each day.  A serving is 3 ounces, about the size of a deck of cards. · Eat 4 to 5 servings of nuts, seeds, and legumes (cooked dried beans, lentils, and split peas) each week. A serving is 1/3 cup of nuts, 2 tablespoons of seeds, or ½ cup of cooked beans or peas. · Limit fats and oils to 2 to 3 servings each day. A serving is 1 teaspoon of vegetable oil or 2 tablespoons of salad dressing. · Limit sweets and added sugars to 5 servings or less a week. A serving is 1 tablespoon jelly or jam, ½ cup sorbet, or 1 cup of lemonade. · Eat less than 2,300 milligrams (mg) of sodium a day. If you limit your sodium to 1,500 mg a day, you can lower your blood pressure even more. Tips for success · Start small. Do not try to make dramatic changes to your diet all at once. You might feel that you are missing out on your favorite foods and then be more likely to not follow the plan. Make small changes, and stick with them. Once those changes become habit, add a few more changes. · Try some of the following: ¨ Make it a goal to eat a fruit or vegetable at every meal and at snacks. This will make it easy to get the recommended amount of fruits and vegetables each day. ¨ Try yogurt topped with fruit and nuts for a snack or healthy dessert. ¨ Add lettuce, tomato, cucumber, and onion to sandwiches. ¨ Combine a ready-made pizza crust with low-fat mozzarella cheese and lots of vegetable toppings. Try using tomatoes, squash, spinach, broccoli, carrots, cauliflower, and onions. ¨ Have a variety of cut-up vegetables with a low-fat dip as an appetizer instead of chips and dip. ¨ Sprinkle sunflower seeds or chopped almonds over salads. Or try adding chopped walnuts or almonds to cooked vegetables. ¨ Try some vegetarian meals using beans and peas. Add garbanzo or kidney beans to salads. Make burritos and tacos with mashed hopkins beans or black beans. Where can you learn more? Go to http://gage-amber.info/. Enter Z039 in the search box to learn more about \"DASH Diet: Care Instructions. \" Current as of: March 23, 2016 Content Version: 11.2 © 9094-2153 [employer]. Care instructions adapted under license by [employer] (which disclaims liability or warranty for this information). If you have questions about a medical condition or this instruction, always ask your healthcare professional. [address] any warranty or liability for your use of this information. 7. Follow low sodium diet. Introducing [facility]! Alexandra Sotelo introduces [employer] patient portal. Now you can access parts of your medical record, email your doctor's office, and request medication refills online. 1. In your internet browser, go to [email or web address] 2. Click on the First Time User? Click Here link in the Sign In box. You will see the New Member Sign Up page. 3. Enter your [employer] Access Code exactly as it appears below. You will not need to use this code after youve completed the sign-up process. If you do not sign up before the expiration date, you must request a new code. · [employer] Access Code: [email or web address] Expires: 7/8/2017  6:52 PM 
 
4. Enter the last four digits of your Social Security Number (xxxx) and Date of Birth (mm/dd/yyyy) as indicated and click Submit. You will be taken to the next sign-up page. 5. Create a [employer] ID. This will be your [employer] login ID and cannot be changed, so think of one that is secure and easy to remember. 6. Create a [employer] password. You can change your password at any time. 7. Enter your Password Reset Question and Answer. This can be used at a later time if you forget your password. 8. Enter your e-mail address. You will receive e-mail notification when new information is available in [address] 9. Click Sign Up. You can now view and download portions of your medical record. 10. Click the Download Summary menu link to download a portable copy of your medical information. If you have questions, please visit the Frequently Asked Questions section of the [employer] website. Remember, [employer] is NOT to be used for urgent needs. For medical emergencies, dial 911. Now available from your iPhone and Android! Please provide this summary of care documentation to your next provider. Your primary care clinician is listed as [address]. If you have any questions after today's visit, please call [phone].

## 2017-05-15 NOTE — PROGRESS NOTES
Alissa Mcgill DNP, ANP-BC  Subjective/HPI:     Edvin Licona is a 78 y.o. male is here for heart failure follow-up, was sent to the emergency room last week during a device check with reported at least 20 pound weight gain in short period of time with dyspnea. Was presented to hospitalist for admission and denied as not meeting admitting criteria. Was diuresed in the emergency room and overall patient reports she is feeling better, edema has decreased and less dyspnea on exertion. He has been on Bumex 1 mg twice a day for the last 3 days. Hypotension has been an issue according the patient. PCP Provider  Earnesteen Dance, MD  Past Medical History:   Diagnosis Date    CAD (coronary artery disease)     Cancer ([facility]     kidney cancer diagnosed Feb 2017    Chronic systolic heart failure ([facility] [facility]     Coronary atherosclerosis of native coronary artery     Diabetes ([facility] [facility]     Heart failure ([facility] [address] [facility]     Other acute and subacute form of ischemic heart disease     Other ill-defined conditions     elevated cholesterol    Paroxysmal ventricular tachycardia (HCC)     Presence of biventricular automatic cardioverter/defibrillator (AICD) 5/2/2016 5/2/16 [employer] upgrade to biventricular AICD implant    S/P ablation of atrial fibrillation 1/8/2016      Past Surgical History:   Procedure Laterality Date    CARDIAC SURG PROCEDURE UNLIST  2005    CABG    HX PACEMAKER       No Known Allergies   Family History   Problem Relation Age of Onset    Heart Disease Mother     Hypertension Mother     Diabetes Mother     Diabetes Father     Heart Disease Father     Hypertension Father       Current Outpatient Prescriptions   Medication Sig    lisinopril (PRINIVIL, ZESTRIL) 5 mg tablet Take 1 Tab by mouth daily. Dose reduced azotemia and hypotension    bumetanide (BUMEX) 1 mg tablet Take 1 Tab by mouth two (2) times a day.  atorvastatin (LIPITOR) 20 mg tablet Take 20 mg by mouth nightly.     insulin NPH/insulin regular (NOVOLIN 70/30, HUMULIN 70/30) 100 unit/mL (70-30) injection 50 Units by SubCUTAneous route Daily (before breakfast). Patients takes 50 units before breakfast and 35 units before dinner    insulin NPH/insulin regular (NOVOLIN 70/30, HUMULIN 70/30) 100 unit/mL (70-30) injection 35 Units by SubCUTAneous route Daily (before dinner). Patients takes 50 units before breakfast and 35 units before dinner    amiodarone (CORDARONE) 200 mg tablet Take 1 Tab by mouth daily.  carvedilol (COREG) 25 mg tablet TAKE 1 TABLET TWICE DAILY    apixaban (ELIQUIS) 5 mg tablet Take 1 Tab by mouth two (2) times a day.  spironolactone (ALDACTONE) 25 mg tablet TAKE 1 TABLET EVERY DAY     No current facility-administered medications for this visit. Vitals:    05/15/17 0907 05/15/17 0915   BP: 120/60 116/60   Pulse: 70    Resp: 16    SpO2: 96%    Weight: 223 lb 8 oz (101.4 kg)    Height: 5' 8\" (1.727 m)      Social History     Social History    Marital status:      Spouse name: N/A    Number of children: N/A    Years of education: N/A     Occupational History    Not on file. Social History Main Topics    Smoking status: Former Smoker     Quit date: 1/8/2005    Smokeless tobacco: Former User     Quit date: 4/9/2011    Alcohol use No    Drug use: No    Sexual activity: Yes     Partners: Female     Other Topics Concern    Not on file     Social History Narrative       I have reviewed the nurses notes, vitals, problem list, allergy list, medical history, family, social history and medications. Review of Symptoms:    General: Pt denies excessive weight gain or loss. Pt is able to conduct ADL's  HEENT: Denies blurred vision, headaches, epistaxis and difficulty swallowing. Respiratory: Denies shortness of breath, + WILSON, wheezing or stridor.   Cardiovascular: Denies precordial pain, palpitations, + edema or PND  Gastrointestinal: Denies poor appetite, indigestion, abdominal pain or blood in stool  Musculoskeletal: Denies pain or swelling from muscles or joints  Neurologic: Denies tremor, paresthesias, or sensory motor disturbance  Skin: Denies rash, itching or texture change. Physical Exam:      General: Well developed, in no acute distress, cooperative and alert  HEENT: No carotid bruits, no JVD, trach is midline. Neck Supple, PEERL, EOM intact. Heart:  Normal S1/S2 negative S3 or S4. Regular, 3/6 systolic murmur, no gallop or rub.   Respiratory: Clear bilaterally x 4, no wheezing or rales  Abdomen:   Soft, non-tender, no masses, bowel sounds are active.   Extremities: +2 bilateral dependent pitting edema, normal cap refill, no cyanosis, atraumatic. Neuro: A&Ox3, speech clear, gait stable. Skin: Skin color is normal. No rashes or lesions.  Non diaphoretic  Vascular: 2+ pulses symmetric in all extremities    Cardiographics    ECG:   Results for orders placed or performed during the hospital encounter of 05/11/17   EKG, 12 LEAD, INITIAL   Result Value Ref Range    Ventricular Rate 70 BPM    Atrial Rate 59 BPM    QRS Duration 144 ms    Q-T Interval 492 ms    QTC Calculation (Bezet) 531 ms    Calculated R Axis -24 degrees    Calculated T Axis 60 degrees    Diagnosis       AV sequential or dual chamber electronic pacemaker  When compared with ECG of 25-OCT-2016 08:25,  No significant change was found  Confirmed by Hong Yañez, P.V. ([id number]) on 5/12/2017 7:05:03 AM           Cardiology Labs:  Lab Results   Component Value Date/Time    Cholesterol, total 114 07/29/2013 09:23 AM    HDL Cholesterol 39 07/29/2013 09:23 AM    LDL, calculated 52 07/29/2013 09:23 AM    Triglyceride 114 07/29/2013 09:23 AM       Lab Results   Component Value Date/Time    Sodium 137 05/11/2017 11:11 AM    Potassium 4.0 05/11/2017 11:11 AM    Chloride 98 05/11/2017 11:11 AM    CO2 31 05/11/2017 11:11 AM    Anion gap 8 05/11/2017 11:11 AM    Glucose 240 05/11/2017 11:11 AM    BUN 15 05/11/2017 11:11 AM    Creatinine 1.65 05/11/2017 11:11 AM    BUN/Creatinine ratio 9 05/11/2017 11:11 AM    GFR est AA 50 05/11/2017 11:11 AM    GFR est non-AA 41 05/11/2017 11:11 AM    Calcium 9.0 05/11/2017 11:11 AM    Bilirubin, total 2.1 05/11/2017 11:11 AM    AST (SGOT) 16 05/11/2017 11:11 AM    Alk. phosphatase 210 05/11/2017 11:11 AM    Protein, total 7.2 05/11/2017 11:11 AM    Albumin 3.0 05/11/2017 11:11 AM    Globulin 4.2 05/11/2017 11:11 AM    A-G Ratio 0.7 05/11/2017 11:11 AM    ALT (SGPT) 16 05/11/2017 11:11 AM           Assessment:     Assessment:     Altaf Art was seen today for shortness of breath. Diagnoses and all orders for this visit:    Chronic systolic heart failure (HCC)  -     XR CHEST PA LAT; Future  -     METABOLIC PANEL, BASIC; Future  -     MAGNESIUM; Future    Essential hypertension, benign  -     AMB POC EKG ROUTINE W/ 12 LEADS, INTER & REP  -     XR CHEST PA LAT; Future  -     METABOLIC PANEL, BASIC; Future  -     MAGNESIUM; Future    WILSON (dyspnea on exertion)  -     XR CHEST PA LAT; Future  -     METABOLIC PANEL, BASIC; Future  -     MAGNESIUM; Future    Other orders  -     lisinopril (PRINIVIL, ZESTRIL) 5 mg tablet; Take 1 Tab by mouth daily. Dose reduced azotemia and hypotension  -     bumetanide (BUMEX) 1 mg tablet; Take 1 Tab by mouth two (2) times a day. ICD-10-CM ICD-9-CM    1. Chronic systolic heart failure (HCC) I50.22 428.22 XR CHEST PA LAT      METABOLIC PANEL, BASIC      MAGNESIUM   2. Essential hypertension, benign I10 401.1 AMB POC EKG ROUTINE W/ 12 LEADS, INTER & REP      XR CHEST PA LAT      METABOLIC PANEL, BASIC      MAGNESIUM   3.  WILSON (dyspnea on exertion) R06.09 786.09 XR CHEST PA LAT      METABOLIC PANEL, BASIC      MAGNESIUM     Orders Placed This Encounter    XR CHEST PA LAT     Standing Status:   Future     Standing Expiration Date:   6/15/2018     Order Specific Question:   Reason for Exam     Answer:   CHF Follow up     Order Specific Question:   Is Patient Allergic to Contrast Dye? Answer:   No    METABOLIC PANEL, BASIC     Standing Status:   Future     Standing Expiration Date:   11/15/2017    MAGNESIUM     Standing Status:   Future     Standing Expiration Date:   11/15/2017    AMB POC EKG ROUTINE W/ 12 LEADS, INTER & REP     Order Specific Question:   Reason for Exam:     Answer:   routine    lisinopril (PRINIVIL, ZESTRIL) 5 mg tablet     Sig: Take 1 Tab by mouth daily. Dose reduced azotemia and hypotension     Dispense:  30 Tab     Refill:  6    bumetanide (BUMEX) 1 mg tablet     Sig: Take 1 Tab by mouth two (2) times a day. Dispense:  180 Tab     Refill:  3        Plan:     Patient is a 62-year-old male with partially compensated systolic heart failure stable for outpatient treatment. 1.  Systolic heart failure: Continue Bumex 1 mg twice a day, diuresing well goal weight 205-210 pounds. 2.  Hypotension: Presents normotensive, will reduce lisinopril from 10 mg to 5 mg daily, watching renal function as mild azotemia present  3. Heart failure education: Discussed with patient low-sodium diet for the most part which he has been following, daily weights in the morning after urinating keep a written log. We will touch base with patient when he is here for EP consult this Thursday, dedicated follow-up 5/26/17 with chest x-ray and renal labs to be performed 5/24/17. Follow-up 2 weeks.   Uvaldo Vargas NP

## 2017-05-17 NOTE — TELEPHONE ENCOUNTER
Notes Recorded by Danica Snyder LPN on 0/63/4439 at 7:52 PM  Verified patient with two identifiers.  Spoke with patient informed him CXR look good no fluid in lungs. She verbalized understanding.

## 2017-05-17 NOTE — PROGRESS NOTES
Verified patient with two identifiers. Spoke with patient informed him CXR look good no fluid in lungs. She verbalized understanding.

## 2017-05-17 NOTE — TELEPHONE ENCOUNTER
----- Message from Cheli Su NP sent at 5/17/2017  2:28 PM EDT -----  Please call patient CXR look good no fluid in lungs.

## 2017-05-22 NOTE — ED NOTES
Verbal report given to GINO Alonso. Patient resting comfortably in stretcher with family at bedside. Patient's family asking for update on plan of care. MD aware and will update patient.

## 2017-05-22 NOTE — ED NOTES
Assumed care of patient from EMS. Family reports patient was \"not acting right\" when at a family members house and periodically drifted in and out of consciousness. Blood sugar via EMS 35. D50 given en route and blood sugar increased to 95. BS 86 upon arrival. Patient alert and oriented x4, respirations even and unlabored. Monitor x3. Call bell within reach.

## 2017-05-22 NOTE — DISCHARGE INSTRUCTIONS
Learning About Low Blood Sugar (Hypoglycemia) in Diabetes  What is low blood sugar (hypoglycemia)? Hypoglycemia means that your blood sugar is low and your body (especially your brain) is not getting enough fuel. If you have diabetes, your blood sugar can go too low if you take too much of some diabetes medicines. It can also go too low if you miss a meal. And it can happen if you exercise too hard without eating enough food. Some medicines used to treat other health problems can cause low blood sugar too. What are the symptoms? Symptoms of low blood sugar can start quickly. It may take just 10 to 15 minutes. If you have had diabetes for many years, you may not realize that your blood sugar is low until it drops very low. · If your blood sugar level drops below 70 (mild low blood sugar), you may feel tired, anxious, dizzy, weak, shaky, or sweaty. You may have a fast heartbeat or blurry vision. · If your blood sugar level continues to drop (usually below 40), your behavior may change. You may feel more irritable. You may find it hard to concentrate or talk. And you may feel unsteady when you stand or walk. You may become too weak or confused to eat something with sugar to raise your blood sugar level. · If your blood sugar level drops very low (usually below 20), you may pass out (lose consciousness). Or you may have a seizure or stroke. If you have symptoms of severe low blood sugar, you need to get medical care right away. If you had a low blood sugar level during the night, you may wake up tired or with a headache. Or you may sweat so much during the night that your pajamas or sheets are damp when you wake up. How is low blood sugar treated? You can treat low blood sugar by eating or drinking something that has 15 grams of carbohydrate. These should be quick-sugar foods.  Check your blood sugar level again 15 minutes after having a quick-sugar food to make sure your level is getting back to your target range. Here are examples of quick-sugar foods that have 15 grams of carbohydrate:  · 3 to 4 glucose tablets  · 1 tube of glucose gel  · Hard candy (such as 3 Jolly Ranchers or 5 to 7 Life Savers)  · 1 tablespoon honey  · 2 tablespoons of raisins  · ½ cup to ¾ cup (4 to 6 ounces) of fruit juice or regular (not diet) soda  · 1 tablespoon of sugar  · 1 cup of fat-free milk  If you have problems with severe low blood sugar, someone else may have to give you a shot of glucagon. This is a hormone that raises blood sugar levels quickly. How can you prevent low blood sugar? You can take steps to prevent low blood sugar. · Follow your treatment plan. Take your insulin or other diabetes medicine exactly as your doctor prescribed it. Talk with your doctor if you're having low blood sugar often. Your medicine may need to be adjusted if it's causing your low blood sugar. · Check your blood sugar levels often. This helps you find early changes before an emergency happens. · Keep a quick-sugar food with you in case your blood sugar level drops low. · Eat small meals more often so that you don't get too hungry between meals. Don't skip meals. · Balance extra exercise with eating more. Check your blood sugar and learn how it changes after exercise. If your blood sugar stays at a normal level, you may not need to eat after you exercise. · Limit how much alcohol you drink. Alcohol can make low blood sugar go even lower. Don't drink alcohol if you have problems recognizing the early signs of low blood sugar. · Keep a diary of your symptoms. This helps you learn when changes in your body may signal low blood sugar. And keep track of how often you have low blood sugar, including when you last ate and what you ate. This will help you learn what causes your blood sugar to drop. · Learn about diabetes and low blood sugar.  Support groups or a diabetes education center can help you understand how medicines, diet, and exercise affect your blood sugar levels. Since low blood sugar levels can quickly become an emergency, be sure to wear medical alert jewelry, such as a medical alert bracelet. This is to let people know you have diabetes so they can get help for you. You can buy this at most drugstores. And make sure your family, friends, and coworkers know the symptoms of low blood sugar. Teach them what to do to get your sugar level up. Follow-up care is a key part of your treatment and safety. Be sure to make and go to all appointments, and call your doctor if you are having problems. It's also a good idea to know your test results and keep a list of the medicines you take. Where can you learn more? Go to http://too-amber.info/. Enter U643 in the search box to learn more about \"Learning About Low Blood Sugar (Hypoglycemia) in Diabetes. \"  Current as of: May 23, 2016  Content Version: 11.2  © 7466-3697 [employer]. Care instructions adapted under license by [employer] (which disclaims liability or warranty for this information). If you have questions about a medical condition or this instruction, always ask your healthcare professional. [place] [address] any warranty or liability for your use of this information. Dehydration: Care Instructions  Your Care Instructions  Dehydration happens when your body loses too much fluid. This might happen when you do not drink enough water or you lose large amounts of fluids from your body because of diarrhea, vomiting, or sweating. Severe dehydration can be life-threatening. Water and minerals called electrolytes help put your body fluids back in balance. Learn the early signs of fluid loss, and drink more fluids to prevent dehydration. Follow-up care is a key part of your treatment and safety. Be sure to make and go to all appointments, and call your doctor if you are having problems.  It's also a good idea to know your test results and keep a list of the medicines you take. How can you care for yourself at home? · To prevent dehydration, drink plenty of fluids, enough so that your urine is light yellow or clear like water. Choose water and other caffeine-free clear liquids until you feel better. If you have kidney, heart, or liver disease and have to limit fluids, talk with your doctor before you increase the amount of fluids you drink. · If you do not feel like eating or drinking, try taking small sips of water, sports drinks, or other rehydration drinks. · Get plenty of rest.  To prevent dehydration  · Add more fluids to your diet and daily routine, unless your doctor has told you not to. · During hot weather, drink more fluids. Drink even more fluids if you exercise a lot. Stay away from drinks with alcohol or caffeine. · Watch for the symptoms of dehydration. These include:  ¨ A dry, sticky mouth. ¨ Dark yellow urine, and not much of it. ¨ Dry and sunken eyes. ¨ Feeling very tired. · Learn what problems can lead to dehydration. These include:  ¨ Diarrhea, fever, and vomiting. ¨ Any illness with a fever, such as pneumonia or the flu. ¨ Activities that cause heavy sweating, such as endurance races and heavy outdoor work in hot or humid weather. ¨ Alcohol or drug abuse or withdrawal.  ¨ Certain medicines, such as cold and allergy pills (antihistamines), diet pills (diuretics), and laxatives. ¨ Certain diseases, such as diabetes, cancer, and heart or kidney disease. When should you call for help? Call 911 anytime you think you may need emergency care. For example, call if:  · You passed out (lost consciousness). Call your doctor now or seek immediate medical care if:  · You are confused and cannot think clearly. · You are dizzy or lightheaded, or you feel like you may faint. · You have signs of needing more fluids. You have sunken eyes and a dry mouth, and you pass only a little dark urine.   · You cannot keep fluids down. Watch closely for changes in your health, and be sure to contact your doctor if:  · You are not making tears. · Your skin is very dry and sags slowly back into place after you pinch it. · Your mouth and eyes are very dry. Where can you learn more? Go to http://too-amber.info/. Enter H166 in the search box to learn more about \"Dehydration: Care Instructions. \"  Current as of: May 27, 2016  Content Version: 11.2  © 1917-6117 [employer]. Care instructions adapted under license by [employer] (which disclaims liability or warranty for this information). If you have questions about a medical condition or this instruction, always ask your healthcare professional. [address] any warranty or liability for your use of this information. Oral Rehydration: Care Instructions  Your Care Instructions  Dehydration occurs when your body loses too much water. This can happen if you do not drink enough fluids or lose a lot of fluid due to diarrhea, vomiting, or sweating. Being dehydrated can cause health problems and can even be life-threatening. To replace lost fluids, you need to drink liquid that contains special chemicals called electrolytes. Electrolytes keep your body working well. Plain water does not have electrolytes. You also need to rest to prevent more fluid loss. Replacing water and electrolytes (oral rehydration) completely takes about 36 hours. But you should feel better within a few hours. Follow-up care is a key part of your treatment and safety. Be sure to make and go to all appointments, and call your doctor if you are having problems. It's also a good idea to know your test results and keep a list of the medicines you take. How can you care for yourself at home? · Take frequent sips of a drink such as Gatorade, Powerade, or other rehydration drinks that your doctor suggests.  These replace both fluid and important chemicals (electrolytes) you need for balance in your blood. · Drink 2 quarts of cool liquid over 2 to 4 hours. You should have at least 10 glasses of liquid a day to replace lost fluid. If you have kidney, heart, or liver disease and have to limit fluids, talk with your doctor before you increase the amount of fluids you drink. · Make your own drink. Measure everything carefully. The drink may not work well or may even be harmful if the amounts are off. ¨ 1 quart water  ¨ ½ teaspoon salt  ¨ 6 teaspoons sugar  · Do not drink liquid with caffeine, such as coffee and jovany. · Do not drink any alcohol. It can make you dehydrated. · Drink plenty of fluids, enough so that your urine is light yellow or clear like water. If you have kidney, heart, or liver disease and have to limit fluids, talk with your doctor before you increase the amount of fluids you drink. When should you call for help? Call 911 anytime you think you may need emergency care. For example, call if:  · You have signs of severe dehydration, such as:  ¨ You are confused or unable to stay awake. ¨ You passed out (lost consciousness). Call your doctor now or seek immediate medical care if:  · You still have signs of dehydration. You have sunken eyes and a dry mouth, and you pass only a little dark urine. · You are dizzy or lightheaded, or you feel like you may faint. · You are not able to keep down fluids. Watch closely for changes in your health, and be sure to contact your doctor if:  · You do not get better as expected. Where can you learn more? Go to http://too-amber.info/. Enter I040 in the search box to learn more about \"Oral Rehydration: Care Instructions. \"  Current as of: May 27, 2016  Content Version: 11.2  © 5621-6429 [employer]. Care instructions adapted under license by [employer] (which disclaims liability or warranty for this information).  If you have questions about a medical condition or this instruction, always ask your healthcare professional. [place] [address] any warranty or liability for your use of this information.

## 2017-05-22 NOTE — ED NOTES
Discharge instructions reviewed with patient. Discharge instructions given to pt per Dr. Zaria Proctor. Patient able to return/verbalize discharge instructions. Copy of discharge instructions given. Pt condition stable, respiratory status within normal limits, neuro status intact. Via wheelchair out of ER, accompanied by family.

## 2017-05-22 NOTE — ED PROVIDER NOTES
HPI Comments: Melva Greenwood is a 78 y.o. male with PMhx significant for CAD, IDDM, HTN, and kidney cancer who presents via EMS to the ED for an evaluation of low blood glucose levels. EMS reports that they were called due to the pt's altered mental status, noting that they were told that they pt was \"not acting right\" and drifting in and out of consciousness. EMS states that the pt's blood glucose was 35 upon initial evaluation and reports giving the pt D50 en route which elevated his glucose level to 95. The pt states that he ate a salad for lunch and notes that he just finished the dinner provided to him in the ED. He also reports receiving a Novolin injection earlier today. He notes that he is currently receiving chemotherapy for his kidney cancer. The pt specifically denies CP, SOB, nausea, or vomiting at this time. SHx: -tobacco, -EtOH, -illicit drug use    PCP: Char Henry MD  Cardiology: Rock Roque MD  Oncology: Aris Hardwick MD    There are no other complaints, changes or physical findings at this time. The history is provided by the patient and the EMS personnel. No  was used.         Past Medical History:   Diagnosis Date    CAD (coronary artery disease)     Cancer ([facility] [address]     kidney cancer diagnosed Feb 2017    Chronic systolic heart failure (HCC)     Coronary atherosclerosis of native coronary artery     Diabetes ([address]     Heart failure ([address]     Other acute and subacute form of ischemic heart disease     Other ill-defined conditions     elevated cholesterol    Paroxysmal ventricular tachycardia ([address]     Presence of biventricular automatic cardioverter/defibrillator (AICD) 5/2/2016 5/2/16 [employer] upgrade to biventricular AICD implant    S/P ablation of atrial fibrillation 1/8/2016       Past Surgical History:   Procedure Laterality Date    CARDIAC SURG PROCEDURE UNLIST  2005    CABG    HX PACEMAKER           Family History:   Problem Relation Age of Onset    Heart Disease Mother     Hypertension Mother     Diabetes Mother     Diabetes Father     Heart Disease Father     Hypertension Father        Social History     Social History    Marital status:      Spouse name: N/A    Number of children: N/A    Years of education: N/A     Occupational History    Not on file. Social History Main Topics    Smoking status: Former Smoker     Quit date: 1/8/2005    Smokeless tobacco: Former User     Quit date: 4/9/2011    Alcohol use No    Drug use: No    Sexual activity: Yes     Partners: Female     Other Topics Concern    Not on file     Social History Narrative         ALLERGIES: Review of patient's allergies indicates no known allergies. Review of Systems   Constitutional: Negative for fever. HENT: Negative. Eyes: Negative. Respiratory: Negative for cough, chest tightness and shortness of breath. Cardiovascular: Negative for chest pain and leg swelling. Gastrointestinal: Negative for abdominal pain, diarrhea, nausea and vomiting. Endocrine: Negative. Genitourinary: Negative for difficulty urinating and dysuria. Musculoskeletal: Negative for myalgias. Skin: Negative. Neurological: Negative. Psychiatric/Behavioral: Negative. All other systems reviewed and are negative. Patient Vitals for the past 12 hrs:   Pulse Resp BP SpO2   05/21/17 2315 70 14 122/73 99 %   05/21/17 2310 70 16 117/62 97 %   05/21/17 2305 70 17 115/61 98 %   05/21/17 2300 70 15 110/60 96 %   05/21/17 2245 70 15 113/58 96 %   05/21/17 2236 70 18 119/69 96 %   05/21/17 2225 70 19 108/84 96 %   05/21/17 2215 70 17 114/57 96 %   05/21/17 2130 71 20 116/70 96 %   05/21/17 2100 70 16 128/70 96 %   05/21/17 2047 70 17 121/66 97 %            Physical Exam   Constitutional: He is oriented to person, place, and time. He appears well-developed and well-nourished. No distress. HENT:   Head: Normocephalic and atraumatic.    Nose: Nose normal.   Mouth/Throat: No oropharyngeal exudate. Eyes: Conjunctivae and EOM are normal. Pupils are equal, round, and reactive to light. Neck: Normal range of motion. Neck supple. No JVD present. Cardiovascular: Normal rate, regular rhythm, normal heart sounds and intact distal pulses. Exam reveals no friction rub. No murmur heard. Pulmonary/Chest: Effort normal and breath sounds normal. No stridor. No respiratory distress. He has no wheezes. He has no rales. Abdominal: Soft. Bowel sounds are normal. He exhibits no distension. There is no tenderness. There is no rebound. Musculoskeletal: Normal range of motion. He exhibits no tenderness. Neurological: He is alert and oriented to person, place, and time. No cranial nerve deficit. Skin: Skin is warm and dry. No rash noted. He is not diaphoretic. Psychiatric: He has a normal mood and affect. His speech is normal and behavior is normal. Judgment and thought content normal. Cognition and memory are normal.   Nursing note and vitals reviewed. MDM  Number of Diagnoses or Management Options  Dehydration:   Hypoglycemia:   Diagnosis management comments: DDX:  Insulin induced hypoglycemia, electrolyte abnormality, dehydration, uti    Plan:  Labs, ua, po glucos    Impression:  hypoglycemia       Amount and/or Complexity of Data Reviewed  Clinical lab tests: ordered and reviewed  Obtain history from someone other than the patient: yes (EMS)  Review and summarize past medical records: yes    Patient Progress  Patient progress: stable    ED Course       Procedures    I reviewed our electronic medical record system for any past medical records that were available that may contribute to the patients current condition, the nursing notes and and vital signs from today's visit    Nursing notes will be reviewed as they become available in realtime while the pt has been in the ED.   Jie Richardson MD    2132  Pt's rate is 16 with a regular rhythm as noted on Cardiac monitor. SpO2 is 98%, on (RA)      12:20 AM  PROGRESS NOTE:  The pt was reevaluated; he states that he is feeling better at this time. His blood glucose level has improved. Advised that the pt eat a snack before going to bed tonight. The pt reports that he has at-home care. Written by Marlen Vogt, ED Scribe, as dictated by Lise De La Garza MD.      12:26 AM  Progress note:  Pt noted to be feeling better, ready for discharge. Discussed lab findings with pt and/or family. Will follow up as instructed. All questions have been answered, pt voiced understanding and agreement with plan. If narcotics were prescribed, pt was advised not to drive or operate heavy machinery. If abx were prescribed, pt advised that diarrhea and rash are possible side effects of the medications. Specific return precautions provided as well as instructions to return to the ED should sx worsen at any time. Lise De La Garza MD      LABORATORY TESTS:  Recent Results (from the past 12 hour(s))   GLUCOSE, POC    Collection Time: 05/21/17  8:43 PM   Result Value Ref Range    Glucose (POC) 86 65 - 100 mg/dL    [facility]    CBC WITH AUTOMATED DIFF    Collection Time: 05/21/17  9:33 PM   Result Value Ref Range    WBC 10.8 4.1 - 11.1 K/uL    RBC 4.04 (L) 4.10 - 5.70 M/uL    HGB 10.5 (L) 12.1 - 17.0 g/dL    HCT 34.9 (L) 36.6 - 50.3 %    MCV 86.4 80.0 - 99.0 FL    MCH 26.0 26.0 - 34.0 PG    MCHC 30.1 30.0 - 36.5 g/dL    RDW 18.2 (H) 11.5 - 14.5 %    PLATELET 281 014 - 193 K/uL    NEUTROPHILS 74 32 - 75 %    LYMPHOCYTES 17 12 - 49 %    MONOCYTES 9 5 - 13 %    EOSINOPHILS 0 0 - 7 %    BASOPHILS 0 0 - 1 %    ABS. NEUTROPHILS 7.9 1.8 - 8.0 K/UL    ABS. LYMPHOCYTES 1.9 0.8 - 3.5 K/UL    ABS. MONOCYTES 0.9 0.0 - 1.0 K/UL    ABS. EOSINOPHILS 0.0 0.0 - 0.4 K/UL    ABS.  BASOPHILS 0.0 0.0 - 0.1 K/UL   CK W/ CKMB & INDEX    Collection Time: 05/21/17  9:33 PM   Result Value Ref Range    CK 45 39 - 308 U/L    CK - MB <1.0 <3.6 NG/ML CK-MB Index Cannot be calulated 0 - 2.5     METABOLIC PANEL, COMPREHENSIVE    Collection Time: 05/21/17  9:33 PM   Result Value Ref Range    Sodium 135 (L) 136 - 145 mmol/L    Potassium 3.9 3.5 - 5.1 mmol/L    Chloride 94 (L) 97 - 108 mmol/L    CO2 34 (H) 21 - 32 mmol/L    Anion gap 7 5 - 15 mmol/L    Glucose 53 (L) 65 - 100 mg/dL    BUN 21 (H) 6 - 20 MG/DL    Creatinine 2.06 (H) 0.70 - 1.30 MG/DL    BUN/Creatinine ratio 10 (L) 12 - 20      GFR est AA 38 (L) >60 ml/min/1.73m2    GFR est non-AA 32 (L) >60 ml/min/1.73m2    Calcium 9.4 8.5 - 10.1 MG/DL    Bilirubin, total 2.4 (H) 0.2 - 1.0 MG/DL    ALT (SGPT) 14 12 - 78 U/L    AST (SGOT) 16 15 - 37 U/L    Alk. phosphatase 153 (H) 45 - 117 U/L    Protein, total 8.0 6.4 - 8.2 g/dL    Albumin 3.3 (L) 3.5 - 5.0 g/dL    Globulin 4.7 (H) 2.0 - 4.0 g/dL    A-G Ratio 0.7 (L) 1.1 - 2.2     TROPONIN I    Collection Time: 05/21/17  9:33 PM   Result Value Ref Range    Troponin-I, Qt. 0.07 (H) <0.05 ng/mL   URINALYSIS W/ REFLEX CULTURE    Collection Time: 05/21/17 10:35 PM   Result Value Ref Range    Color YELLOW/STRAW      Appearance CLEAR CLEAR      Specific gravity 1.012 1.003 - 1.030      pH (UA) 7.5 5.0 - 8.0      Protein TRACE (A) NEG mg/dL    Glucose NEGATIVE  NEG mg/dL    Ketone NEGATIVE  NEG mg/dL    Bilirubin NEGATIVE  NEG      Blood NEGATIVE  NEG      Urobilinogen 1.0 0.2 - 1.0 EU/dL    Nitrites NEGATIVE  NEG      Leukocyte Esterase NEGATIVE  NEG      WBC 0-4 0 - 4 /hpf    RBC 5-10 0 - 5 /hpf    Epithelial cells FEW FEW /lpf    Bacteria NEGATIVE  NEG /hpf    UA:UC IF INDICATED CULTURE NOT INDICATED BY UA RESULT CNI      Hyaline cast 0-2 0 - 5 /lpf   GLUCOSE, POC    Collection Time: 05/21/17 11:50 PM   Result Value Ref Range    Glucose (POC) 78 65 - 100 mg/dL    Performed by Johnny Nicholas, POC    Collection Time: 05/22/17 12:10 AM   Result Value Ref Range    Glucose (POC) 87 65 - 100 mg/dL    Performed by Khushboo Cooper        IMPRESSION:  1.  Hypoglycemia 2. Dehydration        PLAN:  1. Follow-up Information     Follow up With Details Comments Contact Info    Jaime Barksdale MD Schedule an appointment as soon as possible for a visit in 2 days  [phone]          Return to ED if worse       DISCHARGE NOTE:  12:26 AM  The patient has been re-evaluated and is ready for discharge. Reviewed available results with patient. Counseled patient on diagnosis and care plan. Patient has expressed understanding, and all questions have been answered. Patient agrees with plan and agrees to follow up as recommended, or return to the ED if their symptoms worsen. Discharge instructions have been provided and explained to the patient, along with reasons to return to the ED. This note is prepared by Sonia Jeffrey, acting as Scribe for Sharmin Wild MD.    Sharmin Wild MD: The scribe's documentation has been prepared under my direction and personally reviewed by me in its entirety. I confirm that the note above accurately reflects all work, treatment, procedures, and medical decision making performed by me. This note will not be viewable in [address]

## 2017-05-25 NOTE — PROGRESS NOTES
Subjective:      Shaan Ngo is a 78 y.o. male is here for f/u regarding chronic systolic HF with BIV-ICD. He reports that his sob/castañeda and swelling have improved since being on Bumex 2x/day. He was recently in the ED for low BS. He was eating salads and less meat, sugar bottomed out. The patient denies chest pain, orthopnea, PND, palpitations, syncope, presyncope or fatigue.        Patient Active Problem List    Diagnosis Date Noted    Presence of biventricular automatic cardioverter/defibrillator (AICD) 05/02/2016    S/P ablation of atrial fibrillation 01/08/2016    SOBOE (shortness of breath on exertion) 01/06/2016    A-fib ([address] 12/17/2015    Atrial flutter ([address] 12/17/2015    CASTAÑEDA (dyspnea on exertion) 04/21/2015    Chronic systolic heart failure ([address] 04/27/2012    Postsurgical aortocoronary bypass status 04/27/2012    Essential hypertension, benign 04/27/2012    DM (diabetes mellitus) ([address] 04/27/2012    Mixed hyperlipidemia 04/27/2012    Coronary atherosclerosis of native coronary artery 04/27/2012      Jaime Eubanks MD  Past Medical History:   Diagnosis Date    CAD (coronary artery disease)     Cancer ([address]     kidney cancer diagnosed Feb 2017    Chronic systolic heart failure ([address]     Coronary atherosclerosis of native coronary artery     Diabetes ([address]     Heart failure ([address]     Other acute and subacute form of ischemic heart disease     Other ill-defined conditions     elevated cholesterol    Paroxysmal ventricular tachycardia (HCC)     Presence of biventricular automatic cardioverter/defibrillator (AICD) 5/2/2016 5/2/16 [employer] upgrade to biventricular AICD implant    S/P ablation of atrial fibrillation 1/8/2016      Past Surgical History:   Procedure Laterality Date    CARDIAC SURG PROCEDURE UNLIST  2005    CABG    HX PACEMAKER       No Known Allergies   Family History   Problem Relation Age of Onset    Heart Disease Mother     Hypertension Mother  Diabetes Mother     Diabetes Father     Heart Disease Father     Hypertension Father     negative for cardiac disease  Social History     Social History    Marital status:      Spouse name: N/A    Number of children: N/A    Years of education: N/A     Social History Main Topics    Smoking status: Former Smoker     Quit date: 1/8/2005    Smokeless tobacco: Former User     Quit date: 4/9/2011    Alcohol use No    Drug use: No    Sexual activity: Yes     Partners: Female     Other Topics Concern    None     Social History Narrative     Current Outpatient Prescriptions   Medication Sig    bumetanide (BUMEX) 1 mg tablet Take 1 tab daily, increase to one tab 2x a day if weight up 2lbs or more in 24hrs, 5lbs or more in a week    lisinopril (PRINIVIL, ZESTRIL) 5 mg tablet Take 1 Tab by mouth daily. Dose reduced azotemia and hypotension    atorvastatin (LIPITOR) 20 mg tablet Take 20 mg by mouth nightly.  insulin NPH/insulin regular (NOVOLIN 70/30, HUMULIN 70/30) 100 unit/mL (70-30) injection 50 Units by SubCUTAneous route Daily (before breakfast). Patients takes 50 units before breakfast and 35 units before dinner    amiodarone (CORDARONE) 200 mg tablet Take 1 Tab by mouth daily.  carvedilol (COREG) 25 mg tablet TAKE 1 TABLET TWICE DAILY    apixaban (ELIQUIS) 5 mg tablet Take 1 Tab by mouth two (2) times a day.  spironolactone (ALDACTONE) 25 mg tablet TAKE 1 TABLET EVERY DAY    insulin NPH/insulin regular (NOVOLIN 70/30, HUMULIN 70/30) 100 unit/mL (70-30) injection 35 Units by SubCUTAneous route Daily (before dinner). Patients takes 50 units before breakfast and 35 units before dinner     No current facility-administered medications for this visit.        Vitals:    05/25/17 0906   BP: 110/58   Pulse: 70   Resp: 20   SpO2: 97%   Weight: 205 lb 4.8 oz (93.1 kg)   Height: 5' 9\" (1.753 m)       I have reviewed the nurses notes, vitals, problem list, allergy list, medical history, family, social history and medications. Review of Symptoms:    General: Pt denies excessive weight gain or loss. Pt is able to conduct ADL's  HEENT: Denies blurred vision, headaches, epistaxis and difficulty swallowing. Respiratory: Denies shortness of breath, WILSON, wheezing or stridor. Cardiovascular: Denies precordial pain, palpitations, edema or PND  Gastrointestinal: Denies poor appetite, indigestion, abdominal pain or blood in stool  Urinary: Denies dysuria, pyuria  Musculoskeletal: Denies pain or swelling from muscles or joints  Neurologic: Denies tremor, paresthesias, or sensory motor disturbance  Skin: Denies rash, itching or texture change. Psych: Denies depression      Physical Exam:      General: Well developed, in no acute distress. HEENT: Eyes - PERRL, no jvd  Heart:  Normal S1/S2 negative S3 or S4. Regular, no murmur, gallop or rub.   Respiratory: Clear bilaterally x 4, no wheezing or rales  Abdomen:   Soft, non-tender, bowel sounds are active.   Extremities:  No edema, normal cap refill, no cyanosis. Musculoskeletal: No clubbing  Neuro: A&Ox3, speech clear, gait stable. Skin: Skin color is normal. No rashes or lesions.  Non diaphoretic  Vascular: 2+ pulses symmetric in all extremities    Cardiographics    Ekg: V paced    MotionDSP- no events    Results for orders placed or performed during the hospital encounter of 05/11/17   EKG, 12 LEAD, INITIAL   Result Value Ref Range    Ventricular Rate 70 BPM    Atrial Rate 59 BPM    QRS Duration 144 ms    Q-T Interval 492 ms    QTC Calculation (Bezet) 531 ms    Calculated R Axis -24 degrees    Calculated T Axis 60 degrees    Diagnosis       AV sequential or dual chamber electronic pacemaker  When compared with ECG of 25-OCT-2016 08:25,  No significant change was found  Confirmed by Aramis Holcomb P.VGera ([id number]) on 5/12/2017 7:05:03 AM           Lab Results   Component Value Date/Time    WBC 10.8 05/21/2017 09:33 PM    HGB 10.5 05/21/2017 09:33 PM HCT 34.9 05/21/2017 09:33 PM    PLATELET 728 23/89/9274 09:33 PM    MCV 86.4 05/21/2017 09:33 PM      Lab Results   Component Value Date/Time    Sodium 135 05/21/2017 09:33 PM    Potassium 3.9 05/21/2017 09:33 PM    Chloride 94 05/21/2017 09:33 PM    CO2 34 05/21/2017 09:33 PM    Anion gap 7 05/21/2017 09:33 PM    Glucose 53 05/21/2017 09:33 PM    BUN 21 05/21/2017 09:33 PM    Creatinine 2.06 05/21/2017 09:33 PM    BUN/Creatinine ratio 10 05/21/2017 09:33 PM    GFR est AA 38 05/21/2017 09:33 PM    GFR est non-AA 32 05/21/2017 09:33 PM    Calcium 9.4 05/21/2017 09:33 PM    Bilirubin, total 2.4 05/21/2017 09:33 PM    AST (SGOT) 16 05/21/2017 09:33 PM    Alk. phosphatase 153 05/21/2017 09:33 PM    Protein, total 8.0 05/21/2017 09:33 PM    Albumin 3.3 05/21/2017 09:33 PM    Globulin 4.7 05/21/2017 09:33 PM    A-G Ratio 0.7 05/21/2017 09:33 PM    ALT (SGPT) 14 05/21/2017 09:33 PM         Assessment:     Assessment:        ICD-10-CM ICD-9-CM    1. Chronic systolic heart failure (HCC) I50.22 428.22 AMB POC EKG ROUTINE W/ 12 LEADS, INTER & REP      bumetanide (BUMEX) 1 mg tablet   2. Atrial fibrillation, unspecified type (HCC) I48.91 427.31 AMB POC EKG ROUTINE W/ 12 LEADS, INTER & REP      bumetanide (BUMEX) 1 mg tablet   3. Atherosclerosis of native coronary artery of native heart without angina pectoris I25.10 414.01    4. Essential hypertension, benign I10 401.1    5. Mixed hyperlipidemia E78.2 272.2    6. Presence of biventricular automatic cardioverter/defibrillator (AICD) Z95.810 V45.02    7.  Type 2 diabetes mellitus with complication, unspecified long term insulin use status E11.8 250.90      Orders Placed This Encounter    AMB POC EKG ROUTINE W/ 12 LEADS, INTER & REP     Order Specific Question:   Reason for Exam:     Answer:   Routine    DISCONTD: lisinopril (PRINIVIL, ZESTRIL) 5 mg tablet    bumetanide (BUMEX) 1 mg tablet     Sig: Take 1 tab daily, increase to one tab 2x a day if weight up 2lbs or more in 24hrs, 5lbs or more in a week     Dispense:  180 Tab     Refill:  3        Plan:   Mr Kelley Espana is here today for f/u regarding chronic systolic HF with BIV-ICD. His symptoms are improved on Bumex bid. BP is normotensive. EKG V paced. Weight at baseline. Discussed titrating Bumex based on weight gain/symptoms. He is going to take Bumex daily, if weight trends up 2lbs or more in 24hrs he will take it 2x/day. If trends up 5lbs or more in a week he will do bid dosing. If he become more symptomatic with worsening SOB and edema he can additionally take bid dose of Bumex. He will f/u in 2 weeks with BANDAR Schmitt NP. Dr Ludwin Hernandez or myself in a year. Continue medical management for HTN, DM, and hyperlipidemia    Thank you for allowing me to participate in Romayne Poser. 's care.     Kathy Rosa MD, Dominga Quiñones

## 2017-05-25 NOTE — PROGRESS NOTES
Chief Complaint   Patient presents with    Irregular Heart Beat     6 mo f/u    Hypertension     \"    Leg Swelling     bilateral

## 2017-05-25 NOTE — MR AVS SNAPSHOT
Visit Information Date & Time Provider Department Dept. Phone Encounter #  
 5/25/2017  9:45 AM Laya Chávez, [address] [facility] [phone] [id number] Your Appointments 5/26/2017  9:00 AM  
Follow Up with [facility] [address] [facility]) Appt Note: Per Bindu Tidwell [facility] [facility]  
[phone] [id number] [facility] Upcoming Health Maintenance Date Due HEMOGLOBIN A1C Q6M 1940 LIPID PANEL Q1 1942 FOOT EXAM Q1 7/14/1952 MICROALBUMIN Q1 7/14/1952 EYE EXAM RETINAL OR DILATED Q1 7/14/1952 DTaP/Tdap/Td series (1 - Tdap) 7/14/1963 FOBT Q 1 YEAR AGE 50-75 7/14/1992 ZOSTER VACCINE AGE 60> 7/14/2002 GLAUCOMA SCREENING Q2Y 7/14/2007 Pneumococcal 65+ Low/Medium Risk (1 of 2 - PCV13) 7/14/2007 MEDICARE YEARLY EXAM 7/14/2007 INFLUENZA AGE 9 TO ADULT 8/1/2017 Allergies as of 5/25/2017  Review Complete On: 5/25/2017 By: Augusta Kelsey NP No Known Allergies Current Immunizations  Never Reviewed No immunizations on file. Not reviewed this visit You Were Diagnosed With   
  
 Codes Comments Chronic systolic heart failure (HCC)    -  Primary ICD-10-CM: L34.46 ICD-9-CM: 428.22 Atrial fibrillation, unspecified type ([facility] [address]     ICD-10-CM: I48.91 
ICD-9-CM: 427.31 Atherosclerosis of native coronary artery of native heart without angina pectoris     ICD-10-CM: I25.10 ICD-9-CM: 414.01 Essential hypertension, benign     ICD-10-CM: I10 
ICD-9-CM: 401.1 Mixed hyperlipidemia     ICD-10-CM: E78.2 ICD-9-CM: 272.2 Presence of biventricular automatic cardioverter/defibrillator (AICD)     ICD-10-CM: Z95.810 ICD-9-CM: V45.02 Type 2 diabetes mellitus with complication, unspecified long term insulin use status     ICD-10-CM: E11.8 ICD-9-CM: 250.90 Vitals BP Pulse Resp Height(growth percentile) Weight(growth percentile) SpO2  
 110/58 (BP 1 Location: Left arm, BP Patient Position: Sitting) 70 20 5' 9\" (1.753 m) 205 lb 4.8 oz (93.1 kg) 97% BMI Smoking Status 30.32 kg/m2 Former Smoker Vitals History BMI and BSA Data Body Mass Index Body Surface Area  
 30.32 kg/m 2 2.13 m 2 Preferred Pharmacy Pharmacy Name Phone [facility] [address] [place] [address] [phone] Your Updated Medication List  
  
   
This list is accurate as of: 5/25/17  9:48 AM.  Always use your most recent med list.  
  
  
  
  
 amiodarone 200 mg tablet Commonly known as:  CORDARONE Take 1 Tab by mouth daily. apixaban 5 mg tablet Commonly known as:  Chantale Ranjit Take 1 Tab by mouth two (2) times a day. atorvastatin 20 mg tablet Commonly known as:  LIPITOR Take 20 mg by mouth nightly. bumetanide 1 mg tablet Commonly known as:  Synetta Emery Take 1 tab daily, increase to one tab 2x a day if weight up 2lbs or more in 24hrs, 5lbs or more in a week  
  
 carvedilol 25 mg tablet Commonly known as:  COREG  
TAKE 1 TABLET TWICE DAILY  
  
 * insulin NPH/insulin regular 100 unit/mL (70-30) injection Commonly known as:  NOVOLIN 70/30, HUMULIN 70/30  
50 Units by SubCUTAneous route Daily (before breakfast). Patients takes 50 units before breakfast and 35 units before dinner * insulin NPH/insulin regular 100 unit/mL (70-30) injection Commonly known as:  NOVOLIN 70/30, HUMULIN 70/30  
35 Units by SubCUTAneous route Daily (before dinner). Patients takes 50 units before breakfast and 35 units before dinner  
  
 lisinopril 5 mg tablet Commonly known as:  Shirlie Holes Take 1 Tab by mouth daily. Dose reduced azotemia and hypotension  
  
 spironolactone 25 mg tablet Commonly known as:  ALDACTONE  
TAKE 1 TABLET EVERY DAY  
  
 * Notice:   This list has 2 medication(s) that are the same as other medications prescribed for you. Read the directions carefully, and ask your doctor or other care provider to review them with you. We Performed the Following AMB POC EKG ROUTINE W/ 12 LEADS, INTER & REP [63993 CPT(R)] Introducing [facility] SERVICES! [facility] introduces Showcase patient portal. Now you can access parts of your medical record, email your doctor's office, and request medication refills online. 1. In your internet browser, go to [email or web address] 2. Click on the First Time User? Click Here link in the Sign In box. You will see the New Member Sign Up page. 3. Enter your Showcase Access Code exactly as it appears below. You will not need to use this code after youve completed the sign-up process. If you do not sign up before the expiration date, you must request a new code. · Showcase Access Code: [email or web address] Expires: 7/8/2017  6:52 PM 
 
4. Enter the last four digits of your Social Security Number (xxxx) and Date of Birth (mm/dd/yyyy) as indicated and click Submit. You will be taken to the next sign-up page. 5. Create a Showcase ID. This will be your Showcase login ID and cannot be changed, so think of one that is secure and easy to remember. 6. Create a Showcase password. You can change your password at any time. 7. Enter your Password Reset Question and Answer. This can be used at a later time if you forget your password. 8. Enter your e-mail address. You will receive e-mail notification when new information is available in [address] 9. Click Sign Up. You can now view and download portions of your medical record. 10. Click the Download Summary menu link to download a portable copy of your medical information. If you have questions, please visit the Frequently Asked Questions section of the Showcase website. Remember, Showcase is NOT to be used for urgent needs. For medical emergencies, dial 911. Now available from your iPhone and Android! Please provide this summary of care documentation to your next provider. Your primary care clinician is listed as [address]. If you have any questions after today's visit, please call [phone].

## 2017-06-07 NOTE — PROGRESS NOTES
Mynor Licea DNP, ANP-BC  Subjective/HPI:     Gisella Moreno is a 78 y.o. male is here for routine f/u. The patient denies chest pain/ shortness of breath, orthopnea, PND, Mr. Eddie Calvo reports he has been feeling quite well, denies dyspnea, palpitations, syncope, presyncope or fatigue. Chest pain or fatigue. Edema in his legs is significantly improved. PCP Provider  130 Leonie Meza MD  Past Medical History:   Diagnosis Date    CAD (coronary artery disease)     Cancer ([facility] [address]     kidney cancer diagnosed Feb 2017    Chronic systolic heart failure ([facility] [address]     Coronary atherosclerosis of native coronary artery     Diabetes ([facility] [address]     Heart failure ([facility] [address]     Other acute and subacute form of ischemic heart disease     Other ill-defined conditions     elevated cholesterol    Paroxysmal ventricular tachycardia (HCC)     Presence of biventricular automatic cardioverter/defibrillator (AICD) 5/2/2016 5/2/16 [employer] upgrade to biventricular AICD implant    S/P ablation of atrial fibrillation 1/8/2016      Past Surgical History:   Procedure Laterality Date    CARDIAC SURG PROCEDURE UNLIST  2005    CABG    HX PACEMAKER       No Known Allergies   Family History   Problem Relation Age of Onset    Heart Disease Mother     Hypertension Mother     Diabetes Mother     Diabetes Father     Heart Disease Father     Hypertension Father       Current Outpatient Prescriptions   Medication Sig    bumetanide (BUMEX) 1 mg tablet Take 1 tab daily, increase to one tab 2x a day if weight up 2lbs or more in 24hrs, 5lbs or more in a week    lisinopril (PRINIVIL, ZESTRIL) 5 mg tablet Take 1 Tab by mouth daily. Dose reduced azotemia and hypotension    atorvastatin (LIPITOR) 20 mg tablet Take 20 mg by mouth nightly.  insulin NPH/insulin regular (NOVOLIN 70/30, HUMULIN 70/30) 100 unit/mL (70-30) injection 50 Units by SubCUTAneous route Daily (before breakfast).  Patients takes 50 units before breakfast and 35 units before dinner    amiodarone (CORDARONE) 200 mg tablet Take 1 Tab by mouth daily.  carvedilol (COREG) 25 mg tablet TAKE 1 TABLET TWICE DAILY    apixaban (ELIQUIS) 5 mg tablet Take 1 Tab by mouth two (2) times a day.  spironolactone (ALDACTONE) 25 mg tablet TAKE 1 TABLET EVERY DAY     No current facility-administered medications for this visit. Vitals:    06/07/17 1249 06/07/17 1254   BP: 106/68 102/64   Pulse: 70    Resp: 16    SpO2: 98%    Weight: 208 lb (94.3 kg)    Height: 5' 9\" (1.753 m)      Social History     Social History    Marital status:      Spouse name: N/A    Number of children: N/A    Years of education: N/A     Occupational History    Not on file. Social History Main Topics    Smoking status: Former Smoker     Quit date: 1/8/2005    Smokeless tobacco: Former User     Quit date: 4/9/2011    Alcohol use No    Drug use: No    Sexual activity: Yes     Partners: Female     Other Topics Concern    Not on file     Social History Narrative       I have reviewed the nurses notes, vitals, problem list, allergy list, medical history, family, social history and medications. Review of Symptoms:    General: Pt denies excessive weight gain or loss. Pt is able to conduct ADL's  HEENT: Denies blurred vision, headaches, epistaxis and difficulty swallowing. Respiratory: Denies shortness of breath, WILSON, wheezing or stridor. Cardiovascular: Denies precordial pain, palpitations, edema or PND  Gastrointestinal: Denies poor appetite, indigestion, abdominal pain or blood in stool  Musculoskeletal: Denies pain or swelling from muscles or joints  Neurologic: Denies tremor, paresthesias, or sensory motor disturbance  Skin: Denies rash, itching or texture change. Physical Exam:      General: Well developed, in no acute distress, cooperative and alert  HEENT: No carotid bruits, no JVD, trach is midline. Neck Supple, PEERL, EOM intact. Heart:  Normal S1/S2 negative S3 or S4. Regular, no murmur, gallop or rub.   Respiratory: Clear bilaterally x 4, no wheezing or rales  Abdomen:   Soft, non-tender, no masses, bowel sounds are active.   Extremities: +1 bilateral ankle edema, normal cap refill, no cyanosis, atraumatic. Neuro: A&Ox3, speech clear, gait stable. Skin: Skin color is normal. No rashes or lesions. Non diaphoretic  Vascular: 2+ pulses symmetric in all extremities    Cardiographics    ECG:   Results for orders placed or performed during the hospital encounter of 05/11/17   EKG, 12 LEAD, INITIAL   Result Value Ref Range    Ventricular Rate 70 BPM    Atrial Rate 59 BPM    QRS Duration 144 ms    Q-T Interval 492 ms    QTC Calculation (Bezet) 531 ms    Calculated R Axis -24 degrees    Calculated T Axis 60 degrees    Diagnosis       AV sequential or dual chamber electronic pacemaker  When compared with ECG of 25-OCT-2016 08:25,  No significant change was found  Confirmed by Oj Ott P.VGera ([id number]) on 5/12/2017 7:05:03 AM           Cardiology Labs:  Lab Results   Component Value Date/Time    Cholesterol, total 114 07/29/2013 09:23 AM    HDL Cholesterol 39 07/29/2013 09:23 AM    LDL, calculated 52 07/29/2013 09:23 AM    Triglyceride 114 07/29/2013 09:23 AM       Lab Results   Component Value Date/Time    Sodium 135 05/21/2017 09:33 PM    Potassium 3.9 05/21/2017 09:33 PM    Chloride 94 05/21/2017 09:33 PM    CO2 34 05/21/2017 09:33 PM    Anion gap 7 05/21/2017 09:33 PM    Glucose 53 05/21/2017 09:33 PM    BUN 21 05/21/2017 09:33 PM    Creatinine 2.06 05/21/2017 09:33 PM    BUN/Creatinine ratio 10 05/21/2017 09:33 PM    GFR est AA 38 05/21/2017 09:33 PM    GFR est non-AA 32 05/21/2017 09:33 PM    Calcium 9.4 05/21/2017 09:33 PM    Bilirubin, total 2.4 05/21/2017 09:33 PM    AST (SGOT) 16 05/21/2017 09:33 PM    Alk.  phosphatase 153 05/21/2017 09:33 PM    Protein, total 8.0 05/21/2017 09:33 PM    Albumin 3.3 05/21/2017 09:33 PM    Globulin 4.7 05/21/2017 09:33 PM    A-G Ratio 0.7 05/21/2017 09:33 PM    ALT (SGPT) 14 05/21/2017 09:33 PM           Assessment:     Assessment:     Solomon Hughes was seen today for leg swelling. Diagnoses and all orders for this visit:    Chronic systolic heart failure (HCC)  -     METABOLIC PANEL, COMPREHENSIVE    Essential hypertension, benign  -     METABOLIC PANEL, COMPREHENSIVE    Mixed hyperlipidemia  -     METABOLIC PANEL, COMPREHENSIVE    Other orders  -     Cancel: AMB POC EKG ROUTINE W/ 12 LEADS, INTER & REP        ICD-10-CM ICD-9-CM    1. Chronic systolic heart failure (HCC) T73.38 114.73 METABOLIC PANEL, COMPREHENSIVE   2. Essential hypertension, benign R50 259.2 METABOLIC PANEL, COMPREHENSIVE   3. Mixed hyperlipidemia Q41.3 272.6 METABOLIC PANEL, COMPREHENSIVE     Orders Placed This Encounter    METABOLIC PANEL, COMPREHENSIVE        Plan:     1. Systolic heart failure ejection 20% clinically stable. Continue to follow daily weights, standing dose of Bumex 1 mg daily, may take an additional 1 mg if 2-3 pound weight gain from prior day. If anything more than that patient is to call office for further directions. Patient's weight range 205-210 lbs. 2.  Atherosclerotic heart disease: Clinically stable asymptomatic. Follow-up in 2 months. Patient to have blood work performed in 1 month from today's visit.     Dayo Carrero NP

## 2017-06-07 NOTE — PROGRESS NOTES
Chief Complaint   Patient presents with    Leg Swelling     f/u ,denies chest pain, dizziness.   Leg

## 2017-06-16 NOTE — PROGRESS NOTES
Outpatient Infusion Center - Chemotherapy Progress Note    1010 Pt admit to [facility] for [address] ambulatory in stable condition. Accompanied by supportive brother. Assessment completed. No new concerns voiced. #24 G PIV established in right forearm, with positive blood return. Labs drawn per order and sent. Recent Results (from the past 12 hour(s))   METABOLIC PANEL, COMPREHENSIVE    Collection Time: 06/16/17 10:26 AM   Result Value Ref Range    Sodium 133 (L) 136 - 145 mmol/L    Potassium 4.7 3.5 - 5.1 mmol/L    Chloride 98 97 - 108 mmol/L    CO2 27 21 - 32 mmol/L    Anion gap 8 5 - 15 mmol/L    Glucose 114 (H) 65 - 100 mg/dL    BUN 34 (H) 6 - 20 MG/DL    Creatinine 2.44 (H) 0.70 - 1.30 MG/DL    BUN/Creatinine ratio 14 12 - 20      GFR est AA 32 (L) >60 ml/min/1.73m2    GFR est non-AA 26 (L) >60 ml/min/1.73m2    Calcium 8.5 8.5 - 10.1 MG/DL    Bilirubin, total 2.1 (H) 0.2 - 1.0 MG/DL    ALT (SGPT) 16 12 - 78 U/L    AST (SGOT) 15 15 - 37 U/L    Alk. phosphatase 191 (H) 45 - 117 U/L    Protein, total 7.5 6.4 - 8.2 g/dL    Albumin 3.1 (L) 3.5 - 5.0 g/dL    Globulin 4.4 (H) 2.0 - 4.0 g/dL    A-G Ratio 0.7 (L) 1.1 - 2.2     CBC WITH AUTOMATED DIFF    Collection Time: 06/16/17 10:26 AM   Result Value Ref Range    WBC 8.5 4.1 - 11.1 K/uL    RBC 3.73 (L) 4.10 - 5.70 M/uL    HGB 9.7 (L) 12.1 - 17.0 g/dL    HCT 31.4 (L) 36.6 - 50.3 %    MCV 84.2 80.0 - 99.0 FL    MCH 26.0 26.0 - 34.0 PG    MCHC 30.9 30.0 - 36.5 g/dL    RDW 17.6 (H) 11.5 - 14.5 %    PLATELET 068 616 - 692 K/uL    NEUTROPHILS 63 32 - 75 %    LYMPHOCYTES 27 12 - 49 %    MONOCYTES 9 5 - 13 %    EOSINOPHILS 1 0 - 7 %    BASOPHILS 0 0 - 1 %    ABS. NEUTROPHILS 5.3 1.8 - 8.0 K/UL    ABS. LYMPHOCYTES 2.3 0.8 - 3.5 K/UL    ABS. MONOCYTES 0.8 0.0 - 1.0 K/UL    ABS. EOSINOPHILS 0.1 0.0 - 0.4 K/UL    ABS. BASOPHILS 0.0 0.0 - 0.1 K/UL   TSH 3RD GENERATION    Collection Time: 06/16/17 10:26 AM   Result Value Ref Range    TSH 1.04 0.36 - 3.74 uIU/mL     Serum Creat.  Is elevated, no hold orders on chemo order. Phone call made to MD office by pharmacy to double check with Dr. Maureen Oliver, left message to call [facility] back. 1200- spoke to [facility] at MD office, per Dr. Maureen Oliver, continue with treatment today. Patient is scheduled to be seen by nephrologist soon. Pharmacy made aware. Medications:  Normal Saline KVO  Opdivo 240 MG - infused over 1 hour    1350 Pt tolerated treatment well. PIV maintained positive blood return throughout treatment, flushed with positive blood return at conclusion and removed, site covered with 2x2 guaze and coban. D/c home ambulatory in no distress. Pt aware of next [facility] appointment scheduled for 06/30/17.   Visit Vitals    BP 90/54 (BP 1 Location: Left arm, BP Patient Position: Sitting)    Pulse 69    Temp 97.2 °F (36.2 °C)    Resp 18    SpO2 99%

## 2017-06-21 NOTE — PROGRESS NOTES
Spoke with patient  Verified patient with 2 patient identifier  Informed per Perri Friedman NP renal panel looking better,continue current dose of medications   Patient verbalized understanding.

## 2017-06-30 NOTE — PROGRESS NOTES
1050 Pt arrived at [facility] ambulatory and in no distress for Opdivo. Assessment completed, no new complaints voiced. IV established. BP (P) 95/54  Pulse (P) 70  Temp (P) 97.5 °F (36.4 °C)  Resp (P) 18  Ht (P) 5' 8\" (1.727 m)  Wt (P) 94.1 kg (207 lb 6.4 oz)  SpO2 100%  BMI (P) 31.54 kg/m2     Recent Results (from the past 12 hour(s))   CBC WITH AUTOMATED DIFF    Collection Time: 06/30/17 11:05 AM   Result Value Ref Range    WBC 7.2 4.1 - 11.1 K/uL    RBC 3.20 (L) 4.10 - 5.70 M/uL    HGB 8.5 (L) 12.1 - 17.0 g/dL    HCT 27.1 (L) 36.6 - 50.3 %    MCV 84.7 80.0 - 99.0 FL    MCH 26.6 26.0 - 34.0 PG    MCHC 31.4 30.0 - 36.5 g/dL    RDW 16.7 (H) 11.5 - 14.5 %    PLATELET 656 902 - 599 K/uL    NEUTROPHILS 65 32 - 75 %    LYMPHOCYTES 25 12 - 49 %    MONOCYTES 9 5 - 13 %    EOSINOPHILS 1 0 - 7 %    BASOPHILS 0 0 - 1 %    ABS. NEUTROPHILS 4.6 1.8 - 8.0 K/UL    ABS. LYMPHOCYTES 1.8 0.8 - 3.5 K/UL    ABS. MONOCYTES 0.7 0.0 - 1.0 K/UL    ABS. EOSINOPHILS 0.1 0.0 - 0.4 K/UL    ABS. BASOPHILS 0.0 0.0 - 0.1 K/UL   METABOLIC PANEL, COMPREHENSIVE    Collection Time: 06/30/17 11:39 AM   Result Value Ref Range    Sodium 133 (L) 136 - 145 mmol/L    Potassium 4.3 3.5 - 5.1 mmol/L    Chloride 98 97 - 108 mmol/L    CO2 28 21 - 32 mmol/L    Anion gap 7 5 - 15 mmol/L    Glucose 196 (H) 65 - 100 mg/dL    BUN 49 (H) 6 - 20 MG/DL    Creatinine 2.55 (H) 0.70 - 1.30 MG/DL    BUN/Creatinine ratio 19 12 - 20      GFR est AA 30 (L) >60 ml/min/1.73m2    GFR est non-AA 25 (L) >60 ml/min/1.73m2    Calcium 8.4 (L) 8.5 - 10.1 MG/DL    Bilirubin, total 1.4 (H) 0.2 - 1.0 MG/DL    ALT (SGPT) 19 12 - 78 U/L    AST (SGOT) 14 (L) 15 - 37 U/L    Alk. phosphatase 188 (H) 45 - 117 U/L    Protein, total 6.9 6.4 - 8.2 g/dL    Albumin 3.0 (L) 3.5 - 5.0 g/dL    Globulin 3.9 2.0 - 4.0 g/dL    A-G Ratio 0.8 (L) 1.1 - 2.2       1240 Labs phoned to MD office. OK to treat. Pharmacy notified.     Medications received:  Opdivo    1435 Tolerated treatment well, no adverse reaction noted. IV d/c'd.  D/Cd from [facility] ambulatory and in no distress accompanied by self. Next appt 7/14 @ 0800.

## 2017-07-14 NOTE — PROGRESS NOTES
Pt arrived to [facility] ambulatory in no acute distress at 0905 for Opdivo C3.  Assessment unremarkable pt voiced now complaints or concerns. #24 PIV established in Right forearm site WNL and positive blood return noted.  Called and Spoke with [facility] RN made here aware of pt.'s elevated creatinine and LFT's. Ok to treat today order faxed to [facility]    Visit Vitals    BP 98/59 (BP 1 Location: Left arm, BP Patient Position: At rest)    Pulse 70    Temp 97.5 °F (36.4 °C)    Resp 18    Ht 5' 8\" (1.727 m)    Wt 92.8 kg (204 lb 9 oz)    SpO2 98%    BMI 31.1 kg/m2         Labs:   Recent Results (from the past 12 hour(s))   CBC WITH 3 PART DIFF    Collection Time: 07/14/17  8:24 AM   Result Value Ref Range    WBC 7.7 4.1 - 11.1 K/uL    RBC 3.41 (L) 4.10 - 5.70 M/uL    HGB 9.5 (L) 12.1 - 17.0 g/dL    HCT 29.3 (L) 36.6 - 50.3 %    MCV 85.9 80.0 - 99.0 FL    MCH 27.9 26.0 - 34.0 PG    MCHC 32.4 30.0 - 36.5 g/dL    RDW 17.9 (H) 11.8 - 15.8 %    PLATELET 738 039 - 357 K/uL    NEUTROPHILS 60 32 - 75 %    MIXED CELLS 15 3.2 - 16.9 %    LYMPHOCYTES 26 12 - 49 %    ABS. NEUTROPHILS 4.6 1.8 - 8.0 K/UL    ABS. MIXED CELLS 1.1 0.2 - 1.2 K/uL    ABS. LYMPHOCYTES 2.0 0.8 - 3.5 K/UL    DF AUTOMATED     METABOLIC PANEL, COMPREHENSIVE    Collection Time: 07/14/17  8:24 AM   Result Value Ref Range    Sodium 133 (L) 136 - 145 mmol/L    Potassium 3.7 3.5 - 5.1 mmol/L    Chloride 99 97 - 108 mmol/L    CO2 26 21 - 32 mmol/L    Anion gap 8 5 - 15 mmol/L    Glucose 85 65 - 100 mg/dL    BUN 33 (H) 6 - 20 MG/DL    Creatinine 2.63 (H) 0.70 - 1.30 MG/DL    BUN/Creatinine ratio 13 12 - 20      GFR est AA 29 (L) >60 ml/min/1.73m2    GFR est non-AA 24 (L) >60 ml/min/1.73m2    Calcium 9.2 8.5 - 10.1 MG/DL    Bilirubin, total 1.8 (H) 0.2 - 1.0 MG/DL    ALT (SGPT) 28 12 - 78 U/L    AST (SGOT) 20 15 - 37 U/L    Alk.  phosphatase 372 (H) 45 - 117 U/L    Protein, total 7.2 6.4 - 8.2 g/dL    Albumin 3.2 (L) 3.5 - 5.0 g/dL    Globulin 4.0 2.0 - 4.0 g/dL    A-G Ratio 0.8 (L) 1.1 - 2.2     TSH 3RD GENERATION    Collection Time: 07/14/17  8:24 AM   Result Value Ref Range    TSH 1.45 0.36 - 3.74 uIU/mL   T4, FREE    Collection Time: 07/14/17  8:24 AM   Result Value Ref Range    T4, Free 1.8 (H) 0.8 - 1.5 NG/DL       The following medications administered:  NS KVO  Opdivo 240 mg IV over 1 hour    VS:   Recent Results (from the past 12 hour(s))   CBC WITH 3 PART DIFF    Collection Time: 07/14/17  8:24 AM   Result Value Ref Range    WBC 7.7 4.1 - 11.1 K/uL    RBC 3.41 (L) 4.10 - 5.70 M/uL    HGB 9.5 (L) 12.1 - 17.0 g/dL    HCT 29.3 (L) 36.6 - 50.3 %    MCV 85.9 80.0 - 99.0 FL    MCH 27.9 26.0 - 34.0 PG    MCHC 32.4 30.0 - 36.5 g/dL    RDW 17.9 (H) 11.8 - 15.8 %    PLATELET 790 450 - 552 K/uL    NEUTROPHILS 60 32 - 75 %    MIXED CELLS 15 3.2 - 16.9 %    LYMPHOCYTES 26 12 - 49 %    ABS. NEUTROPHILS 4.6 1.8 - 8.0 K/UL    ABS. MIXED CELLS 1.1 0.2 - 1.2 K/uL    ABS. LYMPHOCYTES 2.0 0.8 - 3.5 K/UL    DF AUTOMATED     METABOLIC PANEL, COMPREHENSIVE    Collection Time: 07/14/17  8:24 AM   Result Value Ref Range    Sodium 133 (L) 136 - 145 mmol/L    Potassium 3.7 3.5 - 5.1 mmol/L    Chloride 99 97 - 108 mmol/L    CO2 26 21 - 32 mmol/L    Anion gap 8 5 - 15 mmol/L    Glucose 85 65 - 100 mg/dL    BUN 33 (H) 6 - 20 MG/DL    Creatinine 2.63 (H) 0.70 - 1.30 MG/DL    BUN/Creatinine ratio 13 12 - 20      GFR est AA 29 (L) >60 ml/min/1.73m2    GFR est non-AA 24 (L) >60 ml/min/1.73m2    Calcium 9.2 8.5 - 10.1 MG/DL    Bilirubin, total 1.8 (H) 0.2 - 1.0 MG/DL    ALT (SGPT) 28 12 - 78 U/L    AST (SGOT) 20 15 - 37 U/L    Alk.  phosphatase 372 (H) 45 - 117 U/L    Protein, total 7.2 6.4 - 8.2 g/dL    Albumin 3.2 (L) 3.5 - 5.0 g/dL    Globulin 4.0 2.0 - 4.0 g/dL    A-G Ratio 0.8 (L) 1.1 - 2.2     TSH 3RD GENERATION    Collection Time: 07/14/17  8:24 AM   Result Value Ref Range    TSH 1.45 0.36 - 3.74 uIU/mL   T4, FREE    Collection Time: 07/14/17  8:24 AM   Result Value Ref Range    T4, Free 1.8 (H) 0.8 - 1.5 NG/DL       Pt tolerated treatment well. No adverse reactions noted.  IV flushed per policy and removed, 2x2 and coban placed.  Pt discharged ambulatory in no acute distress at 1210, accompanied by family. Next appointment 7/28/17.

## 2017-07-28 NOTE — PROGRESS NOTES
0805 Pt arrived at [facility] ambulatory and in no distress for Opdivo. Assessment completed, no new complaints voiced. Pt continues to complain of itching and reports MD is aware. IV established. Labs drawn. Spoke with Vivian Fenton at MD office about increased Creatinine 3.43. No new orders given at this time. Patient Vitals for the past 12 hrs:   Temp Pulse Resp BP SpO2   07/28/17 1200 - 70 - 109/64 -   07/28/17 0807 98.3 °F (36.8 °C) 70 18 96/60 99 %     Recent Results (from the past 12 hour(s))   CBC WITH AUTOMATED DIFF    Collection Time: 07/28/17  8:16 AM   Result Value Ref Range    WBC 7.8 4.1 - 11.1 K/uL    RBC 3.20 (L) 4.10 - 5.70 M/uL    HGB 8.5 (L) 12.1 - 17.0 g/dL    HCT 27.8 (L) 36.6 - 50.3 %    MCV 86.9 80.0 - 99.0 FL    MCH 26.6 26.0 - 34.0 PG    MCHC 30.6 30.0 - 36.5 g/dL    RDW 18.0 (H) 11.5 - 14.5 %    PLATELET 410 216 - 485 K/uL    NEUTROPHILS 64 32 - 75 %    LYMPHOCYTES 28 12 - 49 %    MONOCYTES 7 5 - 13 %    EOSINOPHILS 1 0 - 7 %    BASOPHILS 0 0 - 1 %    ABS. NEUTROPHILS 5.0 1.8 - 8.0 K/UL    ABS. LYMPHOCYTES 2.2 0.8 - 3.5 K/UL    ABS. MONOCYTES 0.5 0.0 - 1.0 K/UL    ABS. EOSINOPHILS 0.1 0.0 - 0.4 K/UL    ABS. BASOPHILS 0.0 0.0 - 0.1 K/UL   METABOLIC PANEL, COMPREHENSIVE    Collection Time: 07/28/17  8:16 AM   Result Value Ref Range    Sodium 133 (L) 136 - 145 mmol/L    Potassium 4.7 3.5 - 5.1 mmol/L    Chloride 97 97 - 108 mmol/L    CO2 27 21 - 32 mmol/L    Anion gap 9 5 - 15 mmol/L    Glucose 152 (H) 65 - 100 mg/dL    BUN 44 (H) 6 - 20 MG/DL    Creatinine 3.43 (H) 0.70 - 1.30 MG/DL    BUN/Creatinine ratio 13 12 - 20      GFR est AA 21 (L) >60 ml/min/1.73m2    GFR est non-AA 18 (L) >60 ml/min/1.73m2    Calcium 8.8 8.5 - 10.1 MG/DL    Bilirubin, total 1.5 (H) 0.2 - 1.0 MG/DL    ALT (SGPT) 16 12 - 78 U/L    AST (SGOT) 15 15 - 37 U/L    Alk.  phosphatase 243 (H) 45 - 117 U/L    Protein, total 7.3 6.4 - 8.2 g/dL    Albumin 3.0 (L) 3.5 - 5.0 g/dL    Globulin 4.3 (H) 2.0 - 4.0 g/dL    A-G Ratio 0.7 (L) 1.1 - 2.2         Medications received:  Opdivo 240 mg IV    1200 Tolerated treatment well, no adverse reaction noted. IV d/c'd. D/Cd from [facility] ambulatory and in no distress accompanied by self. Next appt 8/11 @ 0800.

## 2017-08-11 NOTE — PROGRESS NOTES
Pt arrived to [facility] ambulatory in no acute distress at 0800 for Opdivo.  Assessment unremarkable. IV established in L forearm site WNL, and positive blood return noted.  Labs obtained, CBC, CMP, TSH, T4, Iron Profile, Ferritin    Visit Vitals    BP 99/55 (BP 1 Location: Left arm, BP Patient Position: Sitting)    Pulse 70    Temp 97 °F (36.1 °C)    Resp 18    Wt 94.2 kg (207 lb 9.6 oz)    BMI 31.57 kg/m2     Recent Results (from the past 12 hour(s))   CBC WITH AUTOMATED DIFF    Collection Time: 08/11/17  8:09 AM   Result Value Ref Range    WBC 7.7 4.1 - 11.1 K/uL    RBC 3.38 (L) 4.10 - 5.70 M/uL    HGB 8.9 (L) 12.1 - 17.0 g/dL    HCT 29.0 (L) 36.6 - 50.3 %    MCV 85.8 80.0 - 99.0 FL    MCH 26.3 26.0 - 34.0 PG    MCHC 30.7 30.0 - 36.5 g/dL    RDW 16.9 (H) 11.5 - 14.5 %    PLATELET 078 428 - 488 K/uL    NEUTROPHILS 70 32 - 75 %    LYMPHOCYTES 24 12 - 49 %    MONOCYTES 5 5 - 13 %    EOSINOPHILS 1 0 - 7 %    BASOPHILS 0 0 - 1 %    ABS. NEUTROPHILS 5.4 1.8 - 8.0 K/UL    ABS. LYMPHOCYTES 1.9 0.8 - 3.5 K/UL    ABS. MONOCYTES 0.4 0.0 - 1.0 K/UL    ABS. EOSINOPHILS 0.1 0.0 - 0.4 K/UL    ABS. BASOPHILS 0.0 0.0 - 0.1 K/UL   METABOLIC PANEL, COMPREHENSIVE    Collection Time: 08/11/17  8:09 AM   Result Value Ref Range    Sodium 133 (L) 136 - 145 mmol/L    Potassium 3.6 3.5 - 5.1 mmol/L    Chloride 97 97 - 108 mmol/L    CO2 29 21 - 32 mmol/L    Anion gap 7 5 - 15 mmol/L    Glucose 190 (H) 65 - 100 mg/dL    BUN 33 (H) 6 - 20 MG/DL    Creatinine 2.34 (H) 0.70 - 1.30 MG/DL    BUN/Creatinine ratio 14 12 - 20      GFR est AA 33 (L) >60 ml/min/1.73m2    GFR est non-AA 27 (L) >60 ml/min/1.73m2    Calcium 8.8 8.5 - 10.1 MG/DL    Bilirubin, total 1.7 (H) 0.2 - 1.0 MG/DL    ALT (SGPT) 13 12 - 78 U/L    AST (SGOT) 11 (L) 15 - 37 U/L    Alk.  phosphatase 182 (H) 45 - 117 U/L    Protein, total 7.3 6.4 - 8.2 g/dL    Albumin 3.2 (L) 3.5 - 5.0 g/dL    Globulin 4.1 (H) 2.0 - 4.0 g/dL    A-G Ratio 0.8 (L) 1.1 - 2.2     TSH 3RD GENERATION    Collection Time: 08/11/17  8:09 AM   Result Value Ref Range    TSH 1.27 0.36 - 3.74 uIU/mL   NUCLEATED RBC    Collection Time: 08/11/17  8:09 AM   Result Value Ref Range    NRBC 0.0 0  WBC    ABSOLUTE NRBC 0.00 0.00 - 0.01 K/uL     The following medications administered:  [email or web address]  Opdivo 240mg IV over 1 hour    Visit Vitals    /62    Pulse 69    Temp 97 °F (36.1 °C)    Resp 18    Wt 94.2 kg (207 lb 9.6 oz)    BMI 31.57 kg/m2     Pt tolerated treatment well.  IV flushed per policy and removed, 2x2 and pete placed.  Discharge instructions reviewed. Pt discharged ambulatory in no acute distress at 1035, accompanied by friend. Next appointment 8/18/17 at 0830.

## 2017-08-14 NOTE — PROGRESS NOTES
Chief Complaint   Patient presents with    Cholesterol Problem     2 mo f/u    Hypertension     \"    Medication Evaluation     for eliquis

## 2017-08-14 NOTE — MR AVS SNAPSHOT
Visit Information Date & Time Provider Department Dept. Phone Encounter #  
 8/14/2017  9:00 AM Paula Nogueira, [address] [facility] [phone] [id number] Upcoming Health Maintenance Date Due HEMOGLOBIN A1C Q6M 1940 LIPID PANEL Q1 1942 FOOT EXAM Q1 7/14/1952 MICROALBUMIN Q1 7/14/1952 EYE EXAM RETINAL OR DILATED Q1 7/14/1952 DTaP/Tdap/Td series (1 - Tdap) 7/14/1963 ZOSTER VACCINE AGE 60> 5/14/2002 GLAUCOMA SCREENING Q2Y 7/14/2007 Pneumococcal 65+ High/Highest Risk (1 of 2 - PCV13) 7/14/2007 MEDICARE YEARLY EXAM 7/14/2007 INFLUENZA AGE 9 TO ADULT 8/1/2017 Allergies as of 8/14/2017  Review Complete On: 8/14/2017 By: Hussein Cardoza NP No Known Allergies Current Immunizations  Reviewed on 8/11/2017 No immunizations on file. Not reviewed this visit You Were Diagnosed With   
  
 Codes Comments Chronic systolic heart failure (HCC)    -  Primary ICD-10-CM: F20.28 ICD-9-CM: 428.22 Mixed hyperlipidemia     ICD-10-CM: E78.2 ICD-9-CM: 272.2 Postsurgical aortocoronary bypass status     ICD-10-CM: Z95.1 ICD-9-CM: V45.81 Type 2 diabetes mellitus with complication, unspecified long term insulin use status (HCC)     ICD-10-CM: E11.8 ICD-9-CM: 250.90 S/P ablation of atrial fibrillation     ICD-10-CM: Z98.890, Z86.79 
ICD-9-CM: V45.89 Presence of biventricular automatic cardioverter/defibrillator (AICD)     ICD-10-CM: Z95.810 ICD-9-CM: V45.02 Vitals BP Pulse Resp Height(growth percentile) Weight(growth percentile) SpO2  
 112/60 (BP 1 Location: Right arm, BP Patient Position: Sitting) 71 16 5' 8\" (1.727 m) 207 lb 12.8 oz (94.3 kg) 98% BMI Smoking Status 31.6 kg/m2 Former Smoker Vitals History BMI and BSA Data Body Mass Index Body Surface Area  
 31.6 kg/m 2 2.13 m 2 Preferred Pharmacy Pharmacy Name Phone [facility] [address] [place] [address] [phone] Your Updated Medication List  
  
   
This list is accurate as of: 8/14/17  9:47 AM.  Always use your most recent med list.  
  
  
  
  
 amiodarone 200 mg tablet Commonly known as:  CORDARONE Take 1 Tab by mouth daily. apixaban 5 mg tablet Commonly known as:  Daniel Brill Take 1 Tab by mouth two (2) times a day. atorvastatin 20 mg tablet Commonly known as:  LIPITOR Take 20 mg by mouth nightly. bumetanide 1 mg tablet Commonly known as:  Eliza Hollis Take 1 tab daily, increase to one tab 2x a day if weight up 2lbs or more in 24hrs, 5lbs or more in a week  
  
 carvedilol 25 mg tablet Commonly known as:  COREG  
TAKE 1 TABLET TWICE DAILY  
  
 insulin NPH/insulin regular 100 unit/mL (70-30) injection Commonly known as:  NOVOLIN 70/30, HUMULIN 70/30  
50 Units by SubCUTAneous route Daily (before breakfast). Patients takes 50 units before breakfast and 35 units before dinner  
  
 lisinopril 2.5 mg tablet Commonly known as:  Edwin Mic Take 1 Tab by mouth daily. Dose reduced azotemia and hypotension  
  
 spironolactone 25 mg tablet Commonly known as:  ALDACTONE  
TAKE 1 TABLET EVERY DAY Prescriptions Sent to Pharmacy Refills  
 lisinopril (PRINIVIL, ZESTRIL) 2.5 mg tablet 2 Sig: Take 1 Tab by mouth daily. Dose reduced azotemia and hypotension Class: Normal  
 Pharmacy: [address] [place] [address] Ph #: [phone] Route: Oral  
  
We Performed the Following AMB POC EKG ROUTINE W/ 12 LEADS, INTER & REP [23243 CPT(R)] LIPID PANEL [95909 CPT(R)] To-Do List   
 08/18/2017 8:30 AM  
  Appointment with CHAIR 2 [facility] ([phone])  
  
 08/25/2017 9:00 AM  
  Appointment with CHAIR 2 [facility] ([phone])  
  
 09/08/2017 8:00 AM  
 Appointment with CHAIR 2 [address] at [facility] ([phone]) Introducing [facility]! [facility] introduces ufindads patient portal. Now you can access parts of your medical record, email your doctor's office, and request medication refills online. 1. In your internet browser, go to [email or web address] 2. Click on the First Time User? Click Here link in the Sign In box. You will see the New Member Sign Up page. 3. Enter your ufindads Access Code exactly as it appears below. You will not need to use this code after youve completed the sign-up process. If you do not sign up before the expiration date, you must request a new code. · ufindads Access Code: [email or web address] Expires: 10/12/2017  8:01 AM 
 
4. Enter the last four digits of your Social Security Number (xxxx) and Date of Birth (mm/dd/yyyy) as indicated and click Submit. You will be taken to the next sign-up page. 5. Create a ufindads ID. This will be your ufindads login ID and cannot be changed, so think of one that is secure and easy to remember. 6. Create a ufindads password. You can change your password at any time. 7. Enter your Password Reset Question and Answer. This can be used at a later time if you forget your password. 8. Enter your e-mail address. You will receive e-mail notification when new information is available in [address] 9. Click Sign Up. You can now view and download portions of your medical record. 10. Click the Download Summary menu link to download a portable copy of your medical information. If you have questions, please visit the Frequently Asked Questions section of the ufindads website. Remember, ufindads is NOT to be used for urgent needs. For medical emergencies, dial 911. Now available from your iPhone and Android! Please provide this summary of care documentation to your next provider. Your primary care clinician is listed as [facility]. If you have any questions after today's visit, please call [phone].

## 2017-08-14 NOTE — PROGRESS NOTES
João Rodríguez DNP, ANP-BC  Subjective/HPI:     Teresa Neil is a 78 y.o. male is here for routine f/u. The patient denies chest pain/ shortness of breath, orthopnea, PND, LE edema, palpitations, syncope, presyncope or fatigue. Patient reports feeling well in his usual state of health, continues to be followed by oncology and noted increasing BUN and creatinine levels. PCP Provider  Marlene Nascimento MD  Past Medical History:   Diagnosis Date    CAD (coronary artery disease)     Cancer ([facility]     kidney cancer diagnosed Feb 2017    Chronic systolic heart failure ([facility]     Coronary atherosclerosis of native coronary artery     Diabetes ([facility]     Heart failure ([facility]     Other acute and subacute form of ischemic heart disease     Other ill-defined conditions     elevated cholesterol    Paroxysmal ventricular tachycardia (HCC)     Presence of biventricular automatic cardioverter/defibrillator (AICD) 5/2/2016 5/2/16 [employer] upgrade to biventricular AICD implant    S/P ablation of atrial fibrillation 1/8/2016      Past Surgical History:   Procedure Laterality Date    CARDIAC SURG PROCEDURE UNLIST  2005    CABG    HX PACEMAKER       No Known Allergies   Family History   Problem Relation Age of Onset    Heart Disease Mother     Hypertension Mother     Diabetes Mother     Diabetes Father     Heart Disease Father     Hypertension Father       Current Outpatient Prescriptions   Medication Sig    bumetanide (BUMEX) 1 mg tablet Take 1 tab daily, increase to one tab 2x a day if weight up 2lbs or more in 24hrs, 5lbs or more in a week    lisinopril (PRINIVIL, ZESTRIL) 5 mg tablet Take 1 Tab by mouth daily. Dose reduced azotemia and hypotension    atorvastatin (LIPITOR) 20 mg tablet Take 20 mg by mouth nightly.  insulin NPH/insulin regular (NOVOLIN 70/30, HUMULIN 70/30) 100 unit/mL (70-30) injection 50 Units by SubCUTAneous route Daily (before breakfast).  Patients takes 50 units before breakfast and 35 units before dinner    amiodarone (CORDARONE) 200 mg tablet Take 1 Tab by mouth daily.  carvedilol (COREG) 25 mg tablet TAKE 1 TABLET TWICE DAILY    apixaban (ELIQUIS) 5 mg tablet Take 1 Tab by mouth two (2) times a day.  spironolactone (ALDACTONE) 25 mg tablet TAKE 1 TABLET EVERY DAY     No current facility-administered medications for this visit. Vitals:    08/14/17 0918 08/14/17 0925   BP: 109/60 112/60   Pulse: 71    Resp: 16    SpO2: 98%    Weight: 207 lb 12.8 oz (94.3 kg)    Height: 5' 8\" (1.727 m)      Social History     Social History    Marital status:      Spouse name: N/A    Number of children: N/A    Years of education: N/A     Occupational History    Not on file. Social History Main Topics    Smoking status: Former Smoker     Quit date: 1/8/2005    Smokeless tobacco: Former User     Quit date: 4/9/2011    Alcohol use No    Drug use: No    Sexual activity: Yes     Partners: Female     Other Topics Concern    Not on file     Social History Narrative       I have reviewed the nurses notes, vitals, problem list, allergy list, medical history, family, social history and medications. Review of Symptoms:    General: Pt denies excessive weight gain or loss. Pt is able to conduct ADL's  HEENT: Denies blurred vision, headaches, epistaxis and difficulty swallowing. Respiratory: Denies shortness of breath, WILSON, wheezing or stridor. Cardiovascular: Denies precordial pain, palpitations, edema or PND  Gastrointestinal: Denies poor appetite, indigestion, abdominal pain or blood in stool  Musculoskeletal: Denies pain or swelling from muscles or joints  Neurologic: Denies tremor, paresthesias, or sensory motor disturbance  Skin: Denies rash, itching or texture change. Physical Exam:      General: Well developed, in no acute distress, cooperative and alert  HEENT: No carotid bruits, no JVD, trach is midline. Neck Supple, PEERL, EOM intact.   Heart:  Normal S1/S2 negative S3 or S4. Regular, no murmur, gallop or rub.   Respiratory: Clear bilaterally x 4, no wheezing or rales  Abdomen:   Soft, non-tender, no masses, bowel sounds are active.   Extremities: +1 bilateral nonpitting dependent edema, normal cap refill, no cyanosis, atraumatic. Neuro: A&Ox3, speech clear, gait stable. Skin: Skin color is normal. No rashes or lesions. Non diaphoretic  Vascular: 2+ pulses symmetric in all extremities    Cardiographics    ECG: Paced  Results for orders placed or performed during the hospital encounter of 05/11/17   EKG, 12 LEAD, INITIAL   Result Value Ref Range    Ventricular Rate 70 BPM    Atrial Rate 59 BPM    QRS Duration 144 ms    Q-T Interval 492 ms    QTC Calculation (Bezet) 531 ms    Calculated R Axis -24 degrees    Calculated T Axis 60 degrees    Diagnosis       AV sequential or dual chamber electronic pacemaker  When compared with ECG of 25-OCT-2016 08:25,  No significant change was found  Confirmed by Katharina Lang, P.VGera ([id number]) on 5/12/2017 7:05:03 AM           Cardiology Labs:  Lab Results   Component Value Date/Time    Cholesterol, total 114 07/29/2013 09:23 AM    HDL Cholesterol 39 07/29/2013 09:23 AM    LDL, calculated 52 07/29/2013 09:23 AM    Triglyceride 114 07/29/2013 09:23 AM       Lab Results   Component Value Date/Time    Sodium 133 08/11/2017 08:09 AM    Potassium 3.6 08/11/2017 08:09 AM    Chloride 97 08/11/2017 08:09 AM    CO2 29 08/11/2017 08:09 AM    Anion gap 7 08/11/2017 08:09 AM    Glucose 190 08/11/2017 08:09 AM    BUN 33 08/11/2017 08:09 AM    Creatinine 2.34 08/11/2017 08:09 AM    BUN/Creatinine ratio 14 08/11/2017 08:09 AM    GFR est AA 33 08/11/2017 08:09 AM    GFR est non-AA 27 08/11/2017 08:09 AM    Calcium 8.8 08/11/2017 08:09 AM    Bilirubin, total 1.7 08/11/2017 08:09 AM    AST (SGOT) 11 08/11/2017 08:09 AM    Alk.  phosphatase 182 08/11/2017 08:09 AM    Protein, total 7.3 08/11/2017 08:09 AM    Albumin 3.2 08/11/2017 08:09 AM Globulin 4.1 08/11/2017 08:09 AM    A-G Ratio 0.8 08/11/2017 08:09 AM    ALT (SGPT) 13 08/11/2017 08:09 AM           Assessment:     Assessment:     Diagnoses and all orders for this visit:    1. Mixed hyperlipidemia  -     AMB POC EKG ROUTINE W/ 12 LEADS, INTER & REP        ICD-10-CM ICD-9-CM    1. Mixed hyperlipidemia E78.2 272.2 AMB POC EKG ROUTINE W/ 12 LEADS, INTER & REP     Orders Placed This Encounter    AMB POC EKG ROUTINE W/ 12 LEADS, INTER & REP     Order Specific Question:   Reason for Exam:     Answer:   Routine    DISCONTD: lisinopril (PRINIVIL, ZESTRIL) 5 mg tablet        Plan:     1. Systolic heart failure: Ejection fraction 20%, has pacer/ICD: On Coreg and Spironolactone and lisinopril. Noting increasing BUN and creatinine will reduce lisinopril 2.5 mg daily  2. Hypertension controlled continue current medications  3. Systolic murmur: Moderate mitral regurgitation  4. Hyperlipidemia: Presently not on statin, ( CAD/DM) will get baseline Lipid panel and prescribe accordingly. Follow up in 3 months will order ECHO at that time.    Allan Azul NP

## 2017-08-18 NOTE — PROGRESS NOTES
[facility] Stay Note:  Arrived - 80    Visit Vitals    /57 (BP 1 Location: Left arm, BP Patient Position: Sitting)    Pulse 68    Temp 97.5 °F (36.4 °C)    Resp 18     Pt with a [facility] slip for a lipid panel ordered by his Cardiologist.  Pt is fasting today. Called Dr. Samantha Sheth office to request add-on lab as a courtesy. Spoke to Brenda flaherty to draw lipid panel, received order by fax. Pt will notify Cardiologist that Lipid panel was drawn and that results will be in [facility]. CBC with diff drawn in addition. Recent Results (from the past 12 hour(s))   CBC WITH AUTOMATED DIFF    Collection Time: 08/18/17  8:56 AM   Result Value Ref Range    WBC 7.9 4.1 - 11.1 K/uL    RBC 3.31 (L) 4.10 - 5.70 M/uL    HGB 8.7 (L) 12.1 - 17.0 g/dL    HCT 28.6 (L) 36.6 - 50.3 %    MCV 86.4 80.0 - 99.0 FL    MCH 26.3 26.0 - 34.0 PG    MCHC 30.4 30.0 - 36.5 g/dL    RDW 16.9 (H) 11.5 - 14.5 %    PLATELET 489 756 - 720 K/uL    NEUTROPHILS 68 32 - 75 %    LYMPHOCYTES 21 12 - 49 %    MONOCYTES 10 5 - 13 %    EOSINOPHILS 1 0 - 7 %    BASOPHILS 0 0 - 1 %    ABS. NEUTROPHILS 5.3 1.8 - 8.0 K/UL    ABS. LYMPHOCYTES 1.7 0.8 - 3.5 K/UL    ABS. MONOCYTES 0.8 0.0 - 1.0 K/UL    ABS. EOSINOPHILS 0.1 0.0 - 0.4 K/UL    ABS. BASOPHILS 0.0 0.0 - 0.1 K/UL       Assessment - unchanged. Hgb - 8.7  Procrit 10,000 units SQ slowly in left arm.    0950 - Tolerated well. Pt denies any acute problems/changes. Discharged from [facility] ambulatory. No distress. Next appt: 8/25/17 @ 0900.

## 2017-08-24 NOTE — PERIOP NOTES
Spoke with Justa/Dr. Kika Duarte office to advise pt being treated for kidney cancer and recent labs in [facility].

## 2017-08-24 NOTE — PERIOP NOTES
[facility]  Ambulatory Surgery Unit  Pre-operative Instructions for Endo Procedures    Procedure Date  8/28/17            Tentative Arrival Time 0800      1. On the day of your procedure, please report to the Ambulatory Surgery Unit Registration Desk and sign in at your designated time. The Ambulatory Surgery Unit is located in [facility] on the [facility] side of the [facility] across from the [address] [place] [address]. Please have all of your health insurance cards and a photo ID. 2. You must have someone with you to drive you home, as you should not drive a car for 24 hours following anesthesia. Please make arrangements for a responsible adult friend or family member to stay with you for at least the first 24 hours after your procedure. 3. Do not have anything to eat or drink (including water, gum, mints, coffee, juice) after midnight   8/27/17. This may not apply to medications prescribed by your physician. (Please note below the special instructions with medications to take the morning of your procedure.)    4. If applicable, follow the clear liquid diet and bowel prep instructions provided by your physician's office. If you do not have this information, or have any questions, please contact your physician's office. 5. We recommend you do not drink any alcoholic beverages for 24 hours before and after your procedure. 6. Stop all Aspirin, non-steroidal anti-inflammatory drugs (i.e. Advil, Aleve), vitamins, and supplements as directed by your surgeon's office. **If you are currently taking Plavix, Coumadin, or other blood-thinning agents, contact your surgeon for instructions. **    7. In an effort to help prevent surgical site infection, we ask that you shower with an anti-bacterial soap (i.e. Dial or Safeguard) on the morning of your procedure. Do not apply any lotions, powders, or deodorants after showering. 8. Wear comfortable clothes. Wear glasses instead of contacts.  Do not bring any jewelry or money (other than copays or fees as instructed). Do not wear make-up, particularly mascara, the morning of your procedure. Wear your hair loose or down, no ponytails, buns, janeth pins or clips. All body piercings must be removed. 9. You should understand that if you do not follow these instructions your procedure may be cancelled. If your physical condition changes (i.e. fever, cold or flu) please contact your surgeon as soon as possible. 10. It is important that you be on time. If a situation occurs where you may be late, or if you have any questions or problems, please call [phone]. 11. Your procedure time may be subject to change. You will receive a phone call the day prior to confirm your arrival time. Special Instructions: Take all medications and inhalers, as prescribed, on the morning of surgery with a sip of water EXCEPT: no blood sugar medicine      I understand a pre-operative phone call will be made to verify my procedure time. In the event that I am not available, I give permission for a message to be left on my answering service and/or with another person?       Yes     (instructions given verbally during phone assessment- pt voiced understanding)     ___________________      ___________________      ___________________  (Signature of Patient)          (Witness)                   (Date and Time)

## 2017-08-25 NOTE — PROGRESS NOTES
Problem: Chemotherapy Treatment  Goal: *Chemotherapy regimen followed  Outcome: Progressing Towards Goal  Pt receiving Opdivo C2

## 2017-08-25 NOTE — PROGRESS NOTES
[facility] VISIT NOTE    0920  Pt arrived at [facility] ambulatory and in no distress for Opdivo and labs. Assessment completed, pt without complaints. Left forearm peripheral  24 G  IV inserted. Positive blood return noted and labs drawn. Medications received:  Opdiva IV  NS IV  Procrit SQ  Patient Vitals for the past 12 hrs:   Temp Pulse Resp BP SpO2   08/25/17 1200 98.1 °F (36.7 °C) 70 18 119/62 -   08/25/17 0924 98.1 °F (36.7 °C) 71 18 109/62 100 %     Recent Results (from the past 12 hour(s))   CBC WITH AUTOMATED DIFF    Collection Time: 08/25/17  9:43 AM   Result Value Ref Range    WBC 9.0 4.1 - 11.1 K/uL    RBC 3.31 (L) 4.10 - 5.70 M/uL    HGB 8.9 (L) 12.1 - 17.0 g/dL    HCT 28.6 (L) 36.6 - 50.3 %    MCV 86.4 80.0 - 99.0 FL    MCH 26.9 26.0 - 34.0 PG    MCHC 31.1 30.0 - 36.5 g/dL    RDW 17.1 (H) 11.5 - 14.5 %    PLATELET 406 046 - 780 K/uL    NEUTROPHILS 68 32 - 75 %    LYMPHOCYTES 21 12 - 49 %    MONOCYTES 10 5 - 13 %    EOSINOPHILS 1 0 - 7 %    BASOPHILS 0 0 - 1 %    ABS. NEUTROPHILS 6.1 1.8 - 8.0 K/UL    ABS. LYMPHOCYTES 1.9 0.8 - 3.5 K/UL    ABS. MONOCYTES 0.9 0.0 - 1.0 K/UL    ABS. EOSINOPHILS 0.1 0.0 - 0.4 K/UL    ABS. BASOPHILS 0.0 0.0 - 0.1 K/UL   METABOLIC PANEL, COMPREHENSIVE    Collection Time: 08/25/17  9:43 AM   Result Value Ref Range    Sodium 134 (L) 136 - 145 mmol/L    Potassium 3.4 (L) 3.5 - 5.1 mmol/L    Chloride 97 97 - 108 mmol/L    CO2 29 21 - 32 mmol/L    Anion gap 8 5 - 15 mmol/L    Glucose 191 (H) 65 - 100 mg/dL    BUN 18 6 - 20 MG/DL    Creatinine 1.73 (H) 0.70 - 1.30 MG/DL    BUN/Creatinine ratio 10 (L) 12 - 20      GFR est AA 47 (L) >60 ml/min/1.73m2    GFR est non-AA 39 (L) >60 ml/min/1.73m2    Calcium 8.4 (L) 8.5 - 10.1 MG/DL    Bilirubin, total 1.7 (H) 0.2 - 1.0 MG/DL    ALT (SGPT) 11 (L) 12 - 78 U/L    AST (SGOT) 10 (L) 15 - 37 U/L    Alk.  phosphatase 175 (H) 45 - 117 U/L    Protein, total 7.0 6.4 - 8.2 g/dL    Albumin 3.0 (L) 3.5 - 5.0 g/dL    Globulin 4.0 2.0 - 4.0 g/dL A-G Ratio 0.8 (L) 1.1 - 2.2       Tolerated treatment well, no adverse reaction noted. IV removed and pt declined to stay for 30 minute observation. 1200  D/C'd from [facility] ambulatory and in no distress accompanied by self.  Next appointment is 9/1/17 at 0930 am.

## 2017-08-28 NOTE — PROCEDURES
Colonoscopy Procedure Note    Juve Rice.  1940  [id number]        Pre-operative Diagnosis: Anemia,OCCULT BLOOD IN STOOL    Post-operative Diagnosis: Ascending Colon Polyp, Descending Colon Polyp, Sigmoid Colon Polyps, Diverticulosis, Hemorrhoids, Rectal Prolapse,AVM      : Garcia Ron MD    Referring Provider: Claire Samuel MD    Sedation:  MAC anesthesia Propofol        Procedure Details:    After detailed informed consent was obtained with all risks and benefits of procedure explained and preoperative exam completed, the patient was taken to the endoscopy suite and placed in the left lateral decubitus position. Upon sequential sedation as per above, a digital rectal exam was performed  And was normal.  The Olympus videocolonoscope  was inserted in the rectum and carefully advanced to the cecum, which was identified by the ileocecal valve. The quality of preparation was inadequate. The colonoscope was slowly withdrawn with careful evaluation between folds. Retroflexion in the rectum was performed. Findings:     · The prep is inadequate with solid pieces of stool making views difficult. · A total of 10 polyps are noted:           1 cm ascending colon,          6 mm descending colon (both sessile)          and seven, 5 mm to over a cm in size, sigmoid colon polyps were removed all with a hot snare. · Diffuse sigmoid, descending and transverse colon diverticulosis. · East rectal mucosal prolapse with large internal hemorrhoids vs rectal varices are noted  · A large non oozing Ascending colon AVM was noted. Clipped x 1 with a BS Resolution clip          Therapies:  10 complete polypectomy were performed using hot snare  and the polyps were  retrieved  endoclip was placed for hemorrhage    Specimen:  - Specimens were collected as described above and sent to pathology. Complications: None were encountered during the procedure. EBL:  None.     Recommendations:     -Await pathology. -repeat colonoscopy with better prep in 6 months. Naturally, for new bleeding, unexplained weight loss,change in bowel habits and anemia, an earlier colonoscopy should be considered. Garcia Parekh MD  8/28/2017  9:53 AM

## 2017-08-28 NOTE — IP AVS SNAPSHOT
[address] [facility] 
[phone] Patient: Laura Morgan. MRN: [id number] HRB:[birth year] You are allergic to the following No active allergies Recent Documentation Height Weight BMI Smoking Status 1.753 m 94.3 kg 30.72 kg/m2 Former Smoker Emergency Contacts Name Discharge Info Relation Home Work Mobile Jessika Bullard  Sister [23] [phone] About your hospitalization You were admitted on:  August 28, 2017 You last received care in the:  [facility] PACU You were discharged on:  August 28, 2017 Unit phone number:  [phone] Why you were hospitalized Your primary diagnosis was:  Not on File Providers Seen During Your Hospitalizations Provider Role Specialty Primary office phone Nagi Ayon MD Attending Provider Gastroenterology [phone] Your Primary Care Physician (PCP) Primary Care Physician Office Phone Office Fax Apolinar Cota [phone] Follow-up Information None Your Appointments Friday September 01, 2017  9:30 AM EDT INFUSION 15 RI with CHAIR 2 T[address] ([address]) [address]  
[phone] Go to [facility], [facility], [address] Friday September 08, 2017  8:00 AM EDT INFUSION 120 RI with CHAIR 2 [place] [address] ([address]) [address]  
[phone] Go to [facility], [facility], [address] [place] [address] [address] [facility] Friday September 15, 2017  9:00 AM EDT INFUSION 15 RI with CHAIR 2 TRAN [address] ([address]) [address]  
[phone] Go to [facility], [facility], [address] [facility], [address] Friday September 29, 2017  8:00 AM EDT INFUSION 15 RI with CHAIR 2 TRAN [address] ([address]) [address]  
[phone] Go to [facility], [facility], [address] [facility], [place] [address] [address] [facility] Current Discharge Medication List  
  
CONTINUE these medications which have CHANGED Dose & Instructions Dispensing Information Comments Morning Noon Evening Bedtime  
 apixaban 5 mg tablet Commonly known as:  Josh Lynch What changed:  how much to take Your last dose was: Your next dose is:    
   
   
 Dose:  5 mg Take 1 Tab by mouth two (2) times a day. Quantity:  180 Tab Refills:  3 CONTINUE these medications which have NOT CHANGED Dose & Instructions Dispensing Information Comments Morning Noon Evening Bedtime  
 amiodarone 200 mg tablet Commonly known as:  CORDARONE Your last dose was: Your next dose is:    
   
   
 Dose:  200 mg Take 1 Tab by mouth daily. Quantity:  30 Tab Refills:  6  
     
   
   
   
  
 atorvastatin 20 mg tablet Commonly known as:  LIPITOR Your last dose was: Your next dose is:    
   
   
 Dose:  20 mg Take 20 mg by mouth nightly. Refills:  0  
     
   
   
   
  
 bumetanide 1 mg tablet Commonly known as:  Marichuy Messina Your last dose was: Your next dose is: Take 1 tab daily, increase to one tab 2x a day if weight up 2lbs or more in 24hrs, 5lbs or more in a week Quantity:  180 Tab Refills:  3  
     
   
   
   
  
 carvedilol 25 mg tablet Commonly known as:  Khurram Place Your last dose was: Your next dose is: TAKE 1 TABLET TWICE DAILY Quantity:  180 Tab Refills:  2  
     
   
   
   
  
 insulin NPH/insulin regular 100 unit/mL (70-30) injection Commonly known as:  NOVOLIN 70/30, HUMULIN 70/30 Your last dose was: Your next dose is:    
   
   
 Dose:  50 Units 50 Units by SubCUTAneous route Daily (before breakfast). Patients takes 50 units before breakfast and 35 units before dinner Refills:  0  
     
   
   
   
  
 lisinopril 5 mg tablet Commonly known as:  Lobryceie Jump Your last dose was: Your next dose is:    
   
   
 Dose:  5 mg Take 5 mg by mouth daily. Refills:  0  
     
   
   
   
  
 spironolactone 25 mg tablet Commonly known as:  ALDACTONE Your last dose was: Your next dose is: TAKE 1 TABLET EVERY DAY Quantity:  90 Tab Refills:  2 Discharge Instructions [place] Office: [phone] Gurdeepjacqueline [facility]. 
[id number] 
1940 EGD/COLONOSCOPY DISCHARGE INSTRUCTIONS Discomfort: 
Sore throat- throat lozenges or warm salt water gargle 
redness at IV site- apply warm compress to area; if redness or soreness persist- contact your physician Gaseous discomfort- walking, belching will help relieve any discomfort You may not operate a vehicle for 24 hours You may not engage in an occupation involving machinery or appliances for rest of today. You may not drink alcoholic beverages for at least 24 hours Avoid making any critical decisions for at least 24 hour DIET You may resume your regular diet  however -  remember your colon is empty and a heavy meal will produce gas. Avoid these foods:  fried / greasy foods, excessive carbonated drinks or too much caffeine MEDICATIONS Regarding Aspirin or Nonsteroidal medications specifically, please see below. ACTIVITY You may resume your normal daily activities. Spend the remainder of the day resting -  avoid any strenuous activity. CALL M.D.   ANY SIGN OF  
 Increasing pain, nausea, vomiting Abdominal distension (swelling) New increased bleeding (oral or rectal) Fever (chills) Pain in chest area Bloody discharge from nose or mouth Shortness of breath You may not take any Advil, Aspirin, Ibuprofen, Motrin, Aleve, or Goodys for 5 days, ONLY  Tylenol as needed for pain. Restart Eliquis in 4 -5 days. Follow-up Instructions: 
 Call  Garcia Doe MD for any questions or concerns Results of procedure / biopsy in 7 days Telephone # [phone] Follow-up Information None TAKE NARCOTIC PAIN MEDICATIONS WITH FOOD Narcotics tend to be constipating, we suggest taking a stool softener such as Colace or Miralax (follow package instructions). DO NOT DRIVE WHILE TAKING NARCOTIC PAIN MEDICATIONS. DO NOT TAKE SLEEPING MEDICATIONS OR ANTIANXIETY MEDICATIONS WHILE TAKING NARCOTIC PAIN MEDICATIONS,  ESPECIALLY THE NIGHT OF ANESTHESIA. CPAP PATIENTS BE SURE TO WEAR MACHINE WHENEVER NAPPING OR SLEEPING. DISCHARGE SUMMARY from Nurse The following personal items collected during your admission are returned to you:  
Dental Appliance: Dental Appliances: Uppers, With patient, Lowers RETURNED TO PT Vision: Visual Aid: Glasses RETURNED TO PT Hearing Aid:   
Jewelry:   
Clothing:   
Other Valuables:   
Valuables sent to safe:   
 
 
PATIENT INSTRUCTIONS: 
 
 
B - Balance E - Eyes F-  Face looks uneven A-  Arms unable to move or move even S-  Speech slurred or non-existent T-  Time-call 911 as soon as signs and symptoms begin-DO NOT go Back to bed or wait to see if you get better-TIME IS BRAIN. If you have not received your influenza and/or pneumococcal vaccine, please follow up with your primary care physician. The discharge information has been reviewed with the patient and caregiver. The patient and caregiver verbalized understanding. Discharge Orders None Introducing [facility]! Marti Overton introduces [employer] patient portal. Now you can access parts of your medical record, email your doctor's office, and request medication refills online. 1. In your internet browser, go to [email or web address] 2. Click on the First Time User? Click Here link in the Sign In box. You will see the New Member Sign Up page. 3. Enter your [employer] Access Code exactly as it appears below. You will not need to use this code after youve completed the sign-up process.  If you do not sign up before the expiration date, you must request a new code. · [employer] Access Code: [email or web address] Expires: 10/12/2017  8:01 AM 
 
4. Enter the last four digits of your Social Security Number (xxxx) and Date of Birth (mm/dd/yyyy) as indicated and click Submit. You will be taken to the next sign-up page. 5. Create a [employer] ID. This will be your [employer] login ID and cannot be changed, so think of one that is secure and easy to remember. 6. Create a [employer] password. You can change your password at any time. 7. Enter your Password Reset Question and Answer. This can be used at a later time if you forget your password. 8. Enter your e-mail address. You will receive e-mail notification when new information is available in [address] 9. Click Sign Up. You can now view and download portions of your medical record. 10. Click the Download Summary menu link to download a portable copy of your medical information. If you have questions, please visit the Frequently Asked Questions section of the [employer] website. Remember, [employer] is NOT to be used for urgent needs. For medical emergencies, dial 911. Now available from your iPhone and Android! General Information Please provide this summary of care documentation to your next provider. Patient Signature:  ____________________________________________________________ Date:  ____________________________________________________________  
  
Edmond Cart Provider Signature:  ____________________________________________________________ Date:  ____________________________________________________________

## 2017-08-28 NOTE — ANESTHESIA PREPROCEDURE EVALUATION
Anesthetic History   No history of anesthetic complications            Review of Systems / Medical History  Patient summary reviewed, nursing notes reviewed and pertinent labs reviewed    Pulmonary          Smoker (former)         Neuro/Psych   Within defined limits           Cardiovascular    Hypertension      CHF ( denies orthopnea)  Dysrhythmias (s/p ablation 2016) : atrial fibrillation  Pacemaker (AICD; fired 1 year ago) and CAD    Exercise tolerance: >4 METS  Comments: H/o paroxysmal VT  10/16 ECHO= EF 15-20%, mod MR   GI/Hepatic/Renal         Renal disease: CRI       Endo/Other    Diabetes    Cancer (renal)     Other Findings              Physical Exam    Airway  Mallampati: III  TM Distance: 4 - 6 cm  Neck ROM: decreased range of motion   Mouth opening: Normal     Cardiovascular    Rhythm: regular  Rate: normal    Murmur: Grade 3     Dental    Dentition: Full upper dentures and Full lower dentures     Pulmonary  Breath sounds clear to auscultation               Abdominal  GI exam deferred       Other Findings            Anesthetic Plan    ASA: 4  Anesthesia type: general and total IV anesthesia          Induction: Intravenous  Anesthetic plan and risks discussed with: Patient      preop glucose 146.  Took BB yesterday at 8 am. Last Eliquis dose 08/24/17

## 2017-08-28 NOTE — PROCEDURES
[place] Office: [phone]      Esophagogastroduodenoscopy Procedure Note      Alesia Oseguera  1940  [id number]    Indication:  Dyphagia/odynophagia     : Idalmis Lombardo MD    Referring Provider:  Slava Denise MD    Sedation:  MAC anesthesia Propofol    Procedure Details:  After detailed informed consent was obtained for the procedure, with all risks and benefits of procedure explained the patient was taken to the endoscopy suite and placed in the left lateral decubitus position. Following sequential administration of sedation as per above, the endoscope was inserted into the mouth and advanced under direct vision to second portion of the duodenum. A careful inspection was made as the gastroscope was withdrawn, including a retroflexed view of the proximal stomach; findings and interventions are described below. Findings:     Esophagus: The esophageal mucosa in the proximal, mid and distal esophagus is normal.   The squamo-columnar junction is at 40 cm where the Z-line was noted. TTS CRE 18-20 mm balloon dilation done, each station over 60 seconds. Stomach: The gastric mucosa has erythema in the body-A small biopsy was taken(oozing noted). The fundus was found to be normal with no lesions noted on retroflexion. The angularis is normal as well. Duodenum:   The bulb and post bulbar mucosa is normal in appearance. The duodenal folds are normal.     Therapies:  esophageal dilation with 18-20 mm sized balloon  biopsy of stomach body    Specimen: none Specimens were collected as described and send to the laboratory. Complications:   None were encountered during the procedure. EBL:  None. Recommendations:     -Acid suppression with a proton pump inhibitor. ,   -Await pathology. ,   -Follow symptoms. Thank you for entrusting me with this patient's care.  Please do not hesitate to contact me with any questions or if I can be of assistance with any of your other patients' GI needs. Garcia Cardenas MD  8/28/2017  9:51 AM

## 2017-08-28 NOTE — DISCHARGE INSTRUCTIONS
[place] Office: [phone]    Lamin Prado  [id number]  1940    EGD/COLONOSCOPY DISCHARGE INSTRUCTIONS  Discomfort:  Sore throat- throat lozenges or warm salt water gargle  redness at IV site- apply warm compress to area; if redness or soreness persist- contact your physician  Gaseous discomfort- walking, belching will help relieve any discomfort  You may not operate a vehicle for 24 hours  You may not engage in an occupation involving machinery or appliances for rest of today. You may not drink alcoholic beverages for at least 24 hours  Avoid making any critical decisions for at least 24 hour  DIET  You may resume your regular diet - however -  remember your colon is empty and a heavy meal will produce gas. Avoid these foods:  fried / greasy foods, excessive carbonated drinks or too much caffeine  MEDICATIONS   Regarding Aspirin or Nonsteroidal medications specifically, please see below. ACTIVITY  You may resume your normal daily activities. Spend the remainder of the day resting -  avoid any strenuous activity. CALL M.D. ANY SIGN OF   Increasing pain, nausea, vomiting  Abdominal distension (swelling)  New increased bleeding (oral or rectal)  Fever (chills)  Pain in chest area  Bloody discharge from nose or mouth  Shortness of breath    You may not take any Advil, Aspirin, Ibuprofen, Motrin, Aleve, or Goodys for 5 days, ONLY  Tylenol as needed for pain. Restart Eliquis in 4 -5 days. Follow-up Instructions:   Call  Garcia Valdez MD for any questions or concerns  Results of procedure / biopsy in 7 days   Telephone # [phone]      Follow-up Information     None                 TAKE NARCOTIC PAIN MEDICATIONS WITH FOOD   Narcotics tend to be constipating, we suggest taking a stool softener such as Colace or Miralax (follow package instructions). DO NOT DRIVE WHILE TAKING NARCOTIC PAIN MEDICATIONS.     DO NOT TAKE SLEEPING MEDICATIONS OR ANTIANXIETY MEDICATIONS WHILE TAKING NARCOTIC PAIN MEDICATIONS,  ESPECIALLY THE NIGHT OF ANESTHESIA. CPAP PATIENTS BE SURE TO WEAR MACHINE WHENEVER NAPPING OR SLEEPING. DISCHARGE SUMMARY from Nurse    The following personal items collected during your admission are returned to you:   Dental Appliance: Dental Appliances: Uppers, With patient, Lowers RETURNED TO PT  Vision: Visual Aid: Glasses RETURNED TO PT  Hearing Aid:    Jewelry:    Clothing:    Other Valuables:    Valuables sent to safe:        PATIENT INSTRUCTIONS:    After General Anesthesia or Intravenous Sedation, for 24 hours or while taking prescription Narcotics:        Someone should be with you for the next 24 hours. For your own safety, a responsible adult must drive you home. · Limit your activities  · Recommended activity: Rest today, up with assistance today. Do not climb stairs or shower unattended for the next 24 hours. · Please start with a soft bland diet and advance as tolerated (no nausea) to regular diet. · If you have a sore throat you should try the following: fluids, warm salt water gargles, or throat lozenges. If it does not improve after several days please follow up with your primary physician. · Do not drive and operate hazardous machinery  · Do not make important personal or business decisions  · Do  not drink alcoholic beverages  · If you have not urinated within 8 hours after discharge, please contact your surgeon on call. Report the following to your surgeon:  · Excessive pain, swelling, redness or odor of or around the surgical area  · Temperature over 100.5  · Nausea and vomiting lasting longer than 4 hours or if unable to take medications  · Any signs of decreased circulation or nerve impairment to extremity: change in color, persistent  numbness, tingling, coldness or increase pain      · You will receive a Post Operative Call from one of the Recovery Room Nurses on the day after your surgery to check on you.  It is very important for us to know how you are recovering after your surgery. If you have an issue or need to speak with someone, please call your surgeon, do not wait for the post operative call. · You may receive an e-mail or letter in the mail from [employer] regarding your experience with us in the Ambulatory Surgery Unit. Your feedback is valuable to us and we appreciate your participation in the survey. · If the above instructions are not adequate, please contact Karan Allen RN, Jadyn anesthesia Nurse Manager or our Anesthesiologist, at [phone]. If you are having problems after your surgery, call the physician at his office number. · We wish you a speedy recovery ? What to do at Home:      *  Please give a list of your current medications to your Primary Care Provider. *  Please update this list whenever your medications are discontinued, doses are      changed, or new medications (including over-the-counter products) are added. *  Please carry medication information at all times in case of emergency situations. These are general instructions for a healthy lifestyle:    No smoking/ No tobacco products/ Avoid exposure to second hand smoke    Surgeon General's Warning:  Quitting smoking now greatly reduces serious risk to your health. Obesity, smoking, and sedentary lifestyle greatly increases your risk for illness    A healthy diet, regular physical exercise & weight monitoring are important for maintaining a healthy lifestyle    You may be retaining fluid if you have a history of heart failure or if you experience any of the following symptoms:  Weight gain of 3 pounds or more overnight or 5 pounds in a week, increased swelling in our hands or feet or shortness of breath while lying flat in bed. Please call your doctor as soon as you notice any of these symptoms; do not wait until your next office visit.     Recognize signs and symptoms of STROKE:    B - Balance  E - Eyes    F-  Face looks uneven    A-  Arms unable to move or move even    S-  Speech slurred or non-existent    T-  Time-call 911 as soon as signs and symptoms begin-DO NOT go       Back to bed or wait to see if you get better-TIME IS BRAIN. If you have not received your influenza and/or pneumococcal vaccine, please follow up with your primary care physician. The discharge information has been reviewed with the patient and caregiver. The patient and caregiver verbalized understanding.

## 2017-08-28 NOTE — PERIOP NOTES
[address]  1940  [id number]    Situation:  Verbal report given from: Joyce Mcdonald CRNA & FITZ Arce  Procedure: Procedure(s) with comments:  COLONOSCOPY  ESOPHAGOGASTRODUODENOSCOPY (EGD)  ESOPHAGOGASTRODUODENAL (EGD) BIOPSY  ESOPHAGEAL DILATION  COLONOSCOPY WITH POLYPECTOMY  RESOLUTION CLIP - AVM    Background:    Preoperative diagnosis: ATRIAL FIBRILLATION,DYSPHAGIA,DYSPNEA,OCCULT BLOOD IN STOOL    Postoperative diagnosis: Gastritis, Ascending Colon Polyp, Descending Colon Polyp, Sigmoid Colon Polyp, Diverticulosis, Hemorrhoids, Rectal Prolapse    :  Dr. Shahram York    Assistant(s): Circ-1: Roseann Kaye RN  Circ-2: Xavi Lennon RN  Scrub Tech-1: Naoma Miky    Specimens:   ID Type Source Tests Collected by Time Destination   1 : Gastric Biopsy Preservative   Ale Betancourt MD 8/28/2017 1206 Pathology   2 : Ascending Colon Polyp Preservative   Garcia Robles MD 8/28/2017 0935 Pathology   3 : Descending Colon Polyp Preservative   Garcia Robles MD 8/28/2017 0940 Pathology   4 : Sigmoid Colon Polyp Preservative   Garcia Robles MD 8/28/2017 6922 Pathology       Assessment:  Intra-procedure medications   Propofol 180 mg      Anesthesia gave intra-procedure sedation and medications, see anesthesia flow sheet     Intravenous fluids: LR@ KVO     Vital signs stable     Abdominal assessment: round and soft       Recommendation:    Permission to share finding with family or friend yes    All side rails up, bed in low position, wheels locked. Nurse at bedside.

## 2017-08-28 NOTE — ANESTHESIA POSTPROCEDURE EVALUATION
Post-Anesthesia Evaluation and Assessment    Patient: Luca Enriquez. MRN: [id number]  SSN: [id number]    YOB: 1940  Age: 78 y.o. Sex: male       Cardiovascular Function/Vital Signs  Visit Vitals    /65 (BP 1 Location: Left arm, BP Patient Position: Head of bed elevated (Comment degrees))    Pulse 70    Temp 36.6 °C (97.8 °F)    Resp 18    Ht 5' 9\" (1.753 m)    Wt 94.3 kg (208 lb)    SpO2 99%    BMI 30.72 kg/m2       Patient is status post general, total IV anesthesia anesthesia for Procedure(s):  COLONOSCOPY  ESOPHAGOGASTRODUODENOSCOPY (EGD)  ESOPHAGOGASTRODUODENAL (EGD) BIOPSY  ESOPHAGEAL DILATION  COLONOSCOPY WITH POLYPECTOMY  RESOLUTION CLIP. Nausea/Vomiting: None    Postoperative hydration reviewed and adequate. Pain:  Pain Scale 1: Numeric (0 - 10) (08/28/17 1015)  Pain Intensity 1: 0 (08/28/17 1015)   Managed    Neurological Status:   Neuro (WDL): Within Defined Limits (08/28/17 0954)   At baseline    Mental Status and Level of Consciousness: Arousable    Pulmonary Status:   O2 Device: Room air (08/28/17 1015)   Adequate oxygenation and airway patent    Complications related to anesthesia: None    Post-anesthesia assessment completed.  No concerns    Signed By: Franci Marion MD     August 28, 2017

## 2017-08-28 NOTE — H&P
Pre-endoscopy H and P    The patient was seen and examined in the room/pre-op holding area. The airway was assessed and documented. The problem list, past medical history, and medications were reviewed. Patient Active Problem List   Diagnosis Code    Chronic systolic heart failure (HCC) I50.22    Postsurgical aortocoronary bypass status Z95.1    Essential hypertension, benign I10    DM (diabetes mellitus) ([facility] E11.9    Mixed hyperlipidemia E78.2    Coronary atherosclerosis of native coronary artery I25.10    WILSON (dyspnea on exertion) R06.09    A-fib (HCC) I48.91    Atrial flutter ([facility]) I48.92    SOBOE (shortness of breath on exertion) R06.02    S/P ablation of atrial fibrillation Z98.890, Z86.79    Presence of biventricular automatic cardioverter/defibrillator (AICD) Z95.810     Social History     Social History    Marital status:      Spouse name: N/A    Number of children: N/A    Years of education: N/A     Occupational History    Not on file.      Social History Main Topics    Smoking status: Former Smoker     Quit date: 1/8/2005    Smokeless tobacco: Former User     Quit date: 4/9/2011    Alcohol use No    Drug use: No    Sexual activity: Yes     Partners: Female     Other Topics Concern    Not on file     Social History Narrative     Past Medical History:   Diagnosis Date    CAD (coronary artery disease)     h/o CABG    Cancer ([facility] [address]     kidney cancer diagnosed Feb 2017    Chronic systolic heart failure ([facility] [address] [facility]     Coronary atherosclerosis of native coronary artery     Diabetes ([facility] [facility]     Heart failure ([facility] [address] [facility]     Hypertension     Other acute and subacute form of ischemic heart disease     Other ill-defined conditions     elevated cholesterol    Paroxysmal ventricular tachycardia ([facility] [address]     Presence of biventricular automatic cardioverter/defibrillator (AICD) 5/2/2016 5/2/16 [employer] upgrade to biventricular AICD implant    S/P ablation of atrial fibrillation 2016         Prior to Admission Medications   Prescriptions Last Dose Informant Patient Reported? Taking?   amiodarone (CORDARONE) 200 mg tablet 2017 at Unknown time Transfer Papers No Yes   Sig: Take 1 Tab by mouth daily. apixaban (ELIQUIS) 5 mg tablet 2017 at Unknown time  No Yes   Sig: Take 1 Tab by mouth two (2) times a day. Patient taking differently: Take 2.5 mg by mouth two (2) times a day. atorvastatin (LIPITOR) 20 mg tablet 2017 at Unknown time Transfer Papers Yes Yes   Sig: Take 20 mg by mouth nightly. bumetanide (BUMEX) 1 mg tablet 2017 at Unknown time  No Yes   Sig: Take 1 tab daily, increase to one tab 2x a day if weight up 2lbs or more in 24hrs, 5lbs or more in a week   carvedilol (COREG) 25 mg tablet 2017 at 0800 Transfer Papers No Yes   Sig: TAKE 1 TABLET TWICE DAILY   insulin NPH/insulin regular (NOVOLIN 70/30, HUMULIN 70/30) 100 unit/mL (70-30) injection 2017 Other Yes Yes   Si Units by SubCUTAneous route Daily (before breakfast). Patients takes 50 units before breakfast and 35 units before dinner   lisinopril (PRINIVIL, ZESTRIL) 5 mg tablet 2017 at Unknown time  Yes Yes   Sig: Take 5 mg by mouth daily. spironolactone (ALDACTONE) 25 mg tablet 2017 at Unknown time Transfer Papers No Yes   Sig: TAKE 1 TABLET EVERY DAY      Facility-Administered Medications: None           The review of systems is:  Negative  for shortness of breath or chest pain      The heart, lungs, and mental status were satisfactory for the administration of deep sedation and for the procedure. I discussed with the patient the objectives, risks, consequences and alternatives to the procedure.       Larisa Interiano MD  2017  8:54 AM

## 2017-08-28 NOTE — PERIOP NOTES
CRE balloon dilatation of the esophagus   18 mm Balloon inflated to 3 ATMs and held for 60 seconds. 19 mm Balloon inflated to 4.5 ATMs and held for 60 seconds. 20 mm Balloon inflated to 6 ATMs and held for 60 seconds. No subcutaneous crepitus of the chest or cervical region was noted post dilatation.

## 2017-09-01 NOTE — PROGRESS NOTES
[facility] short consult note:    5603 Pt arrived to [facility] ambulatory and in no distress for Procrit. Denies any new complaints. CBC drawn. /66  Pulse 70  Temp 97.9 °F (36.6 °C)  Resp 18     Recent Results (from the past 12 hour(s))   CBC WITH AUTOMATED DIFF    Collection Time: 09/01/17  9:10 AM   Result Value Ref Range    WBC 8.8 4.1 - 11.1 K/uL    RBC 3.40 (L) 4.10 - 5.70 M/uL    HGB 9.1 (L) 12.1 - 17.0 g/dL    HCT 29.2 (L) 36.6 - 50.3 %    MCV 85.9 80.0 - 99.0 FL    MCH 26.8 26.0 - 34.0 PG    MCHC 31.2 30.0 - 36.5 g/dL    RDW 17.3 (H) 11.5 - 14.5 %    PLATELET 819 797 - 150 K/uL    NEUTROPHILS 68 32 - 75 %    LYMPHOCYTES 21 12 - 49 %    MONOCYTES 10 5 - 13 %    EOSINOPHILS 1 0 - 7 %    BASOPHILS 0 0 - 1 %    ABS. NEUTROPHILS 5.9 1.8 - 8.0 K/UL    ABS. LYMPHOCYTES 1.9 0.8 - 3.5 K/UL    ABS. MONOCYTES 0.9 0.0 - 1.0 K/UL    ABS. EOSINOPHILS 0.1 0.0 - 0.4 K/UL    ABS. BASOPHILS 0.0 0.0 - 0.1 K/UL       Medication given:  Procrit    0945 Discharged home ambulatory and in no distress. Tolerated procedure well.  Next appointment 9/8 @ 0800

## 2017-09-08 NOTE — PROGRESS NOTES
0810 Pt arrived at [facility] ambulatory and in no distress for opdivo. Assessment completed, no new complaints voiced. Pt reports having 4-5 stools yesterday but states no abdominal pain, no watery stool. Appetite fair. Weight noted to be up 12 lbs from 2 weeks ago, pt states he has fluid pills and instructions on how to increase when LE swell. IV started x multiple attempts, left AC #24 started by Ania Cruz RN. Labs done at [address] [place] [address] on 9/7/17, scanned into chart. Visit Vitals    /67 (BP 1 Location: Left arm, BP Patient Position: Sitting)    Pulse 70    Temp 97.4 °F (36.3 °C)    Resp 20    Ht 5' 8\" (1.727 m)    Wt 99.3 kg (218 lb 14.4 oz)    SpO2 98%    BMI 33.28 kg/m2       Medications received:  NS @ KVO  opdivo 240 mg IV over 1 hour  Procrit 09761 units SQ JAMES    Left IV flushed and removed    Visit Vitals    /62    Pulse 68    Temp 97.2 °F (36.2 °C)    Resp 18    Ht 5' 8\" (1.727 m)    Wt 99.3 kg (218 lb 14.4 oz)    SpO2 98%    BMI 33.28 kg/m2       1040 Tolerated treatment well, no adverse reaction noted. D/Cd from [facility] ambulatory and in no distress.   Next appt 9/15/17

## 2017-09-22 NOTE — PROGRESS NOTES
8000 [facility] Stay Note:  Arrived - 8131    Visit Vitals    BP (!) 85/52 (BP 1 Location: Left arm, BP Patient Position: Sitting)    Pulse 71    Temp 97.6 °F (36.4 °C)    Resp 18    SpO2 98%     Recent Results (from the past 12 hour(s))   CBC WITH AUTOMATED DIFF    Collection Time: 09/22/17  1:17 PM   Result Value Ref Range    WBC 9.9 4.1 - 11.1 K/uL    RBC 3.36 (L) 4.10 - 5.70 M/uL    HGB 9.1 (L) 12.1 - 17.0 g/dL    HCT 29.0 (L) 36.6 - 50.3 %    MCV 86.3 80.0 - 99.0 FL    MCH 27.1 26.0 - 34.0 PG    MCHC 31.4 30.0 - 36.5 g/dL    RDW 16.4 (H) 11.5 - 14.5 %    PLATELET 311 680 - 353 K/uL    NEUTROPHILS 72 32 - 75 %    LYMPHOCYTES 17 12 - 49 %    MONOCYTES 10 5 - 13 %    EOSINOPHILS 1 0 - 7 %    BASOPHILS 0 0 - 1 %    ABS. NEUTROPHILS 7.2 1.8 - 8.0 K/UL    ABS. LYMPHOCYTES 1.7 0.8 - 3.5 K/UL    ABS. MONOCYTES 1.0 0.0 - 1.0 K/UL    ABS. EOSINOPHILS 0.1 0.0 - 0.4 K/UL    ABS. BASOPHILS 0.0 0.0 - 0.1 K/UL   IRON PROFILE    Collection Time: 09/22/17  1:17 PM   Result Value Ref Range    Iron 24 (L) 35 - 150 ug/dL    TIBC 249 (L) 250 - 450 ug/dL    Iron % saturation 10 (L) 20 - 50 %   FERRITIN    Collection Time: 09/22/17  1:17 PM   Result Value Ref Range    Ferritin 92 26 - 351 NG/ML   METABOLIC PANEL, BASIC    Collection Time: 09/22/17  1:17 PM   Result Value Ref Range    Sodium 137 136 - 145 mmol/L    Potassium 3.6 3.5 - 5.1 mmol/L    Chloride 99 97 - 108 mmol/L    CO2 27 21 - 32 mmol/L    Anion gap 11 5 - 15 mmol/L    Glucose 140 (H) 65 - 100 mg/dL    BUN 23 (H) 6 - 20 MG/DL    Creatinine 1.80 (H) 0.70 - 1.30 MG/DL    BUN/Creatinine ratio 13 12 - 20      GFR est AA 45 (L) >60 ml/min/1.73m2    GFR est non-AA 37 (L) >60 ml/min/1.73m2    Calcium 8.5 8.5 - 10.1 MG/DL       Assessment - completed. BP low, pt denies dizziness/lightheadedness. Hgb - 9.1  Procrit 10,000 units SQ slowly in left arm.    1400 - Tolerated well. Pt denies any acute problems/changes. Discharged from [facility] ambulatory.  No distress. Next appt: 9/29/17 @ 0800.

## 2017-09-29 NOTE — PROGRESS NOTES
Pt arrived to [facility] ambulatory in no acute distress at 0805 for Opdivo and Procrit .  Assessment unremarkable except pt c/o increased WILSON. He has doubled up on his Bumex as ordered by his physcian . Pt has a  2 lb weight gain in 1 week. #24 PIV established in Left AC site WNL and positive blood return noted.      Patient Vitals for the past 12 hrs:   Temp Pulse Resp BP SpO2   09/29/17 1019 - 70 18 103/61 99 %   09/29/17 0814 97.5 °F (36.4 °C) 70 22 (!) 89/54 99 %       Labs obtained,   Recent Results (from the past 12 hour(s))   CBC WITH 3 PART DIFF    Collection Time: 09/29/17  8:25 AM   Result Value Ref Range    WBC 12.3 (H) 4.1 - 11.1 K/uL    RBC 3.45 (L) 4.10 - 5.70 M/uL    HGB 9.1 (L) 12.1 - 17.0 g/dL    HCT 30.2 (L) 36.6 - 50.3 %    MCV 87.5 80.0 - 99.0 FL    MCH 26.4 26.0 - 34.0 PG    MCHC 30.1 30.0 - 36.5 g/dL    RDW 16.1 (H) 11.8 - 15.8 %    PLATELET 040 588 - 672 K/uL    NEUTROPHILS 76 (H) 32 - 75 %    MIXED CELLS 9 3.2 - 16.9 %    LYMPHOCYTES 15 12 - 49 %    ABS. NEUTROPHILS 9.3 (H) 1.8 - 8.0 K/UL    ABS. MIXED CELLS 1.1 0.2 - 1.2 K/uL    ABS. LYMPHOCYTES 1.9 0.8 - 3.5 K/UL    DF AUTOMATED     METABOLIC PANEL, COMPREHENSIVE    Collection Time: 09/29/17  8:25 AM   Result Value Ref Range    Sodium 135 (L) 136 - 145 mmol/L    Potassium 4.0 3.5 - 5.1 mmol/L    Chloride 100 97 - 108 mmol/L    CO2 29 21 - 32 mmol/L    Anion gap 6 5 - 15 mmol/L    Glucose 180 (H) 65 - 100 mg/dL    BUN 28 (H) 6 - 20 MG/DL    Creatinine 2.34 (H) 0.70 - 1.30 MG/DL    BUN/Creatinine ratio 12 12 - 20      GFR est AA 33 (L) >60 ml/min/1.73m2    GFR est non-AA 27 (L) >60 ml/min/1.73m2    Calcium 8.3 (L) 8.5 - 10.1 MG/DL    Bilirubin, total 1.9 (H) 0.2 - 1.0 MG/DL    ALT (SGPT) 10 (L) 12 - 78 U/L    AST (SGOT) 10 (L) 15 - 37 U/L    Alk.  phosphatase 195 (H) 45 - 117 U/L    Protein, total 6.9 6.4 - 8.2 g/dL    Albumin 2.8 (L) 3.5 - 5.0 g/dL    Globulin 4.1 (H) 2.0 - 4.0 g/dL    A-G Ratio 0.7 (L) 1.1 - 2.2     T4, FREE Collection Time: 09/29/17  8:25 AM   Result Value Ref Range    T4, Free 2.0 (H) 0.8 - 1.5 NG/DL   TSH 3RD GENERATION    Collection Time: 09/29/17  8:25 AM   Result Value Ref Range    TSH 1.27 0.36 - 3.74 uIU/mL       The following medications administered:  NS KVO  Opdivo 240 mg IV over 1 hour  Procrit 10,00 mg Sub-Q injection in left arm    Pt tolerated treatment well. No adverse reactions noted.  IV flushed per policy and removed, 2x2 and coban placed.  Pt discharged ambulatory in no acute distress at 1020, accompanied by family. Next appointment 10/6/17.

## 2017-10-06 NOTE — PROGRESS NOTES
Outpatient Infusion Center Progress Note    0830 Pt admit to [facility] for Procrit ambulatory in stable condition. Assessment completed. No new concerns voiced. Labs drawn per order and sent for results prior to treatment. Recent Results (from the past 12 hour(s))   CBC W/O DIFF    Collection Time: 10/06/17  8:55 AM   Result Value Ref Range    WBC 14.8 (H) 4.1 - 11.1 K/uL    RBC 3.45 (L) 4.10 - 5.70 M/uL    HGB 8.9 (L) 12.1 - 17.0 g/dL    HCT 29.1 (L) 36.6 - 50.3 %    MCV 84.3 80.0 - 99.0 FL    MCH 25.8 (L) 26.0 - 34.0 PG    MCHC 30.6 30.0 - 36.5 g/dL    RDW 16.1 (H) 11.5 - 14.5 %    PLATELET 468 578 - 656 K/uL     Medications:  Procrit 40,000 units SQ left arm    0930 Pt tolerated treatment well. D/c home ambulatory in no distress. Pt aware of next appointment scheduled for 10/13/17.   Patient Vitals for the past 12 hrs:   Temp Pulse Resp BP   10/06/17 0840 97.8 °F (36.6 °C) 70 18 104/62

## 2017-10-13 NOTE — PROGRESS NOTES
Outpatient Infusion Center - Chemotherapy Progress Note    0815 Pt admit to [facility] for Opdivo/Procrit ambulatory in stable condition. Assessment completed. No new concerns voiced. #24 G PIV established in right forearm, with positive blood return. Labs drawn per order and sent for results prior to treatment. Recent Results (from the past 12 hour(s))   METABOLIC PANEL, COMPREHENSIVE    Collection Time: 10/13/17  8:33 AM   Result Value Ref Range    Sodium 136 136 - 145 mmol/L    Potassium 3.3 (L) 3.5 - 5.1 mmol/L    Chloride 99 97 - 108 mmol/L    CO2 28 21 - 32 mmol/L    Anion gap 9 5 - 15 mmol/L    Glucose 156 (H) 65 - 100 mg/dL    BUN 25 (H) 6 - 20 MG/DL    Creatinine 1.81 (H) 0.70 - 1.30 MG/DL    BUN/Creatinine ratio 14 12 - 20      GFR est AA 45 (L) >60 ml/min/1.73m2    GFR est non-AA 37 (L) >60 ml/min/1.73m2    Calcium 8.6 8.5 - 10.1 MG/DL    Bilirubin, total 1.9 (H) 0.2 - 1.0 MG/DL    ALT (SGPT) 9 (L) 12 - 78 U/L    AST (SGOT) 10 (L) 15 - 37 U/L    Alk. phosphatase 224 (H) 45 - 117 U/L    Protein, total 6.1 (L) 6.4 - 8.2 g/dL    Albumin 2.6 (L) 3.5 - 5.0 g/dL    Globulin 3.5 2.0 - 4.0 g/dL    A-G Ratio 0.7 (L) 1.1 - 2.2     TSH 3RD GENERATION    Collection Time: 10/13/17  8:33 AM   Result Value Ref Range    TSH 1.46 0.36 - 3.74 uIU/mL   CBC WITH 3 PART DIFF    Collection Time: 10/13/17  8:33 AM   Result Value Ref Range    WBC 15.8 (H) 4.1 - 11.1 K/uL    RBC 3.39 (L) 4.10 - 5.70 M/uL    HGB 8.8 (L) 12.1 - 17.0 g/dL    HCT 29.0 (L) 36.6 - 50.3 %    MCV 85.5 80.0 - 99.0 FL    MCH 26.0 26.0 - 34.0 PG    MCHC 30.3 30.0 - 36.5 g/dL    RDW 15.7 11.8 - 15.8 %    PLATELET 160 489 - 907 K/uL    NEUTROPHILS 87 (H) 32 - 75 %    MIXED CELLS 5 3.2 - 16.9 %    LYMPHOCYTES 8 (L) 12 - 49 %    ABS. NEUTROPHILS 13.7 (H) 1.8 - 8.0 K/UL    ABS. MIXED CELLS 0.9 0.2 - 1.2 K/uL    ABS.  LYMPHOCYTES 1.2 0.8 - 3.5 K/UL    DF AUTOMATED         Medications:  opdivo 240 MG - infused over 1 hour  Procrit 10,000 units SQ left arm    1145 Pt tolerated treatment well. PIV maintained positive blood return throughout treatment, flushed with positive blood return at conclusion and removed at conclusion, site covered with 2x2 guaze and coban. D/c home ambulatory in no distress. Pt aware of next [facility] appointment scheduled for 10/20/17.   Patient Vitals for the past 12 hrs:   Temp Pulse Resp BP SpO2   10/13/17 0819 97.7 °F (36.5 °C) 70 18 95/58 97 %

## 2017-10-20 NOTE — PROGRESS NOTES
0810 Pt arrived at [facility] ambulatory and in no distress for labs/ procrit. Assessment completed, pt noted to be SOB, no worse than usual per pt. Also c/o nausea, encouraged him to call doctor office for medication. Labs obtained x 2 attempts. Visit Vitals    /60 (BP 1 Location: Right arm, BP Patient Position: Sitting)    Pulse 70    Temp 97.6 °F (36.4 °C)    Resp 22    SpO2 99%       Medications received:  Procrit 69527 units SQ JAMES    0855 Tolerated treatment well, no adverse reaction noted. D/Cd from [facility] ambulatory and in no distress. Next appt next Friday 10/27/17    Recent Results (from the past 8 hour(s))   CBC WITH AUTOMATED DIFF    Collection Time: 10/20/17  8:17 AM   Result Value Ref Range    WBC 17.8 (H) 4.1 - 11.1 K/uL    RBC 3.59 (L) 4.10 - 5.70 M/uL    HGB 9.2 (L) 12.1 - 17.0 g/dL    HCT 29.9 (L) 36.6 - 50.3 %    MCV 83.3 80.0 - 99.0 FL    MCH 25.6 (L) 26.0 - 34.0 PG    MCHC 30.8 30.0 - 36.5 g/dL    RDW 16.1 (H) 11.5 - 14.5 %    PLATELET 661 044 - 730 K/uL    NEUTROPHILS 86 (H) 32 - 75 %    LYMPHOCYTES 8 (L) 12 - 49 %    MONOCYTES 6 5 - 13 %    EOSINOPHILS 0 0 - 7 %    BASOPHILS 0 0 - 1 %    ABS. NEUTROPHILS 15.3 (H) 1.8 - 8.0 K/UL    ABS. LYMPHOCYTES 1.4 0.8 - 3.5 K/UL    ABS. MONOCYTES 1.1 (H) 0.0 - 1.0 K/UL    ABS. EOSINOPHILS 0.1 0.0 - 0.4 K/UL    ABS.  BASOPHILS 0.0 0.0 - 0.1 K/UL

## 2017-10-25 NOTE — TELEPHONE ENCOUNTER
Patient is still waiting for refill of Eliquis. PLEASE advise him today if this has been authorized for refill. Thanks!

## 2017-10-25 NOTE — TELEPHONE ENCOUNTER
Pt returning your call. Please call patient back @ [phone]. Wants Rx sent to [facility] on [address]. Pt wants a call when rx is faxed over to pharmacy.

## 2017-10-27 NOTE — PROGRESS NOTES
0800 Pt arrived at [facility] ambulatory for labs /opdivo/ procrit. Opdivo HELD. Assessment completed, pt noted to be SOB, more than usual per pt. States he couldn't put his shoes on because of his difficulty breathing, and that it took him 20 min to walk to car bc of SOB. IV started left AC with 22 gauge. Labs drawn. CBC resulted, WBC 25. Maycol Serum at [address] called to update on CBC and pt condition. Orders to hold treatment today, send pt for CXR and then to be seen in office. Pt assisted to outpatient registration via wheelchair, with orders in hand and will go to [facility] once completed. Visit Vitals    /63 (BP 1 Location: Left arm, BP Patient Position: Sitting)    Pulse 78    Temp 97.5 °F (36.4 °C)    Resp 20    Wt 103.1 kg (227 lb 3.2 oz)    SpO2 97%    BMI 34.55 kg/m2       Medications received:  Procrit 32858 units SQ JAMES    0925 Tolerated treatment well, no adverse reaction noted. D/Cd from [facility] via wheelchair and in no distress accompanied by volunteer.   Next appt next Friday 11/3/17

## 2017-11-03 NOTE — PROGRESS NOTES
8000 [facility] Stay Note:  0800- Arrived via wheelchair for Procrit    Visit Vitals    /58 (BP 1 Location: Left arm, BP Patient Position: Sitting)    Pulse 71    Temp 96.9 °F (36.1 °C)    Resp 20    SpO2 96%     Holding Opdivo today per CASEY Goldman. Pt will follow up with Dr. Zuri Real in office on Tuesday. Assessment - unchanged except pt feels SOB and needed assistance by using a wheelchair today. CBC w. Diff drawn via peripheral stick. Hgb - 9.6    Recent Results (from the past 12 hour(s))   CBC WITH AUTOMATED DIFF    Collection Time: 11/03/17  8:02 AM   Result Value Ref Range    WBC 34.3 (H) 4.1 - 11.1 K/uL    RBC 3.88 (L) 4.10 - 5.70 M/uL    HGB 9.6 (L) 12.1 - 17.0 g/dL    HCT 31.0 (L) 36.6 - 50.3 %    MCV 79.9 (L) 80.0 - 99.0 FL    MCH 24.7 (L) 26.0 - 34.0 PG    MCHC 31.0 30.0 - 36.5 g/dL    RDW 17.2 (H) 11.5 - 14.5 %    PLATELET 946 316 - 552 K/uL    NEUTROPHILS 90 (H) 32 - 75 %    LYMPHOCYTES 5 (L) 12 - 49 %    MONOCYTES 5 5 - 13 %    EOSINOPHILS 0 0 - 7 %    BASOPHILS 0 0 - 1 %    ABS. NEUTROPHILS 30.9 (H) 1.8 - 8.0 K/UL    ABS. LYMPHOCYTES 1.7 0.8 - 3.5 K/UL    ABS. MONOCYTES 1.7 (H) 0.0 - 1.0 K/UL    ABS. EOSINOPHILS 0.0 0.0 - 0.4 K/UL    ABS. BASOPHILS 0.0 0.0 - 0.1 K/UL    RBC COMMENTS POLYCHROMASIA  PRESENT        RBC COMMENTS HYPOCHROMIA  1+        RBC COMMENTS POIKILOCYTOSIS  PRESENT        DF SMEAR SCANNED         Medications:  Procrit 10,000 units SQ slowly in L arm.    0835 - Tolerated well. Pt denies any acute problems/changes. Discharged from [facility] in wheelchair accompanied by brother. No distress.  Next appt: 11/10 at 8 AM.

## 2017-11-06 PROBLEM — E87.1 HYPONATREMIA: Status: ACTIVE | Noted: 2017-01-01

## 2017-11-06 PROBLEM — I50.9 HEART FAILURE (HCC): Status: ACTIVE | Noted: 2017-01-01

## 2017-11-06 PROBLEM — N18.9 CKD (CHRONIC KIDNEY DISEASE): Status: ACTIVE | Noted: 2017-01-01

## 2017-11-06 PROBLEM — C79.9 METASTATIC CANCER (HCC): Status: ACTIVE | Noted: 2017-01-01

## 2017-11-06 NOTE — CONSULTS
Impression: metastatic renal cell carcinoma - he was initially treated with votrient  From the time of his diagnosis in December through April 2017 and then switched to Opdivo (nivolumab immunotherapy) which he has received until now. Unfortunately his most recent CT scan of 11/1/17 shows progressive disease with increased size of a mass in the pancreatic head, new and enlarging liver mets, increased ascites, with stable solid left renal mass and stable retroperiteoneal and mediastinal adenopathy. Dr. Gene Castle is trying to get approval for cabozantinib for him. This agent is oral and has a fairly impressive response rate in metastatic RCC. For now, will see how he does with regard to getting over his acute event. Thank you for the consult!  Karlie Li MD FACP

## 2017-11-06 NOTE — ED PROVIDER NOTES
[address]  EMERGENCY DEPARTMENT HISTORY AND PHYSICAL EXAM       Date of Service: 11/6/2017   Patient Name: Eden Kawasaki. YOB: 1940  Medical Record Number: [id number]    History of Presenting Illness     Chief Complaint   Patient presents with    Leg Swelling     Pt. complains of increase leg swelling with hx. of kidney ca . History Provided By:  patient    Additional History:   Eden Kawasaki. is a 78 y.o. male who presents ambulatory to the ED with cc of increased leg swelling to BL lower and upper extremities and face x several days. Pt reports he was referred to the ER by Dr. Christal Peabody (oncology). He states he was taken off of his fluid pill two weeks ago secondary to acute kidney failure. Nothing makes if better and nothing makes it worse and he has not treated it with anything. He specifically denies any fevers, chills, nausea, vomiting, chest pain, headache, rash, diarrhea, sweating or weight loss. PMHx & Surgical hx: HTN, HLD, DM, CHF, CAD, coronary atherosclerosis, a-fib ablation, CABG, pacemaker, kidney CA,   Social Hx: -(former) Tobacco, - EtOH, - Illicit Drugs    There are no other complaints, changes or physical findings at this time.     Primary Care Provider: Caty Chi MD   Oncology: Winston Casanova MD    Past History     Past Medical History:   Past Medical History:   Diagnosis Date    CAD (coronary artery disease)     h/o CABG    Cancer [facility])     kidney cancer diagnosed Feb 2017    Chronic systolic heart failure ([address]     Coronary atherosclerosis of native coronary artery     Diabetes ([address]     Heart failure ([address]     Hypertension     Other acute and subacute form of ischemic heart disease     Other ill-defined conditions(799.89)     elevated cholesterol    Paroxysmal ventricular tachycardia ([address]     Presence of biventricular automatic cardioverter/defibrillator (AICD) 5/2/2016 5/2/16 [employer] upgrade to biventricular AICD implant    S/P ablation of atrial fibrillation 1/8/2016        Past Surgical History:   Past Surgical History:   Procedure Laterality Date    CARDIAC SURG PROCEDURE UNLIST  2005    CABG    COLONOSCOPY N/A 8/28/2017    COLONOS[facility]ez [facility]no [facility] AM[facility] OR    HX PACEMAKER          Family History:   Family History   Problem Relation Age of Onset    Heart Disease Mother     Hypertension Mother     Diabetes Mother     Diabetes Father     Heart Disease Father     Hypertension Father         Social History:   Social History   Substance Use Topics    Smoking status: Former Smoker     Quit date: 1/8/2005    Smokeless tobacco: Former User     Quit date: 4/9/2011    Alcohol use No        Allergies:   No Known Allergies      Review of Systems   Review of Systems   Constitutional: Negative. Negative for activity change, appetite change, chills, fatigue, fever and unexpected weight change. HENT: Positive for facial swelling. Negative for congestion, hearing loss, rhinorrhea, sneezing and voice change. Eyes: Negative. Negative for pain and visual disturbance. Respiratory: Positive for shortness of breath. Negative for apnea, cough, choking and chest tightness. Cardiovascular: Positive for leg swelling (BL). Negative for chest pain and palpitations. Gastrointestinal: Negative. Negative for abdominal distention, abdominal pain, blood in stool, diarrhea, nausea and vomiting. Genitourinary: Negative. Negative for difficulty urinating, flank pain, frequency and urgency. No discharge   Musculoskeletal: Negative for arthralgias, back pain, myalgias and neck stiffness. Positive for swelling in BL upper extremities. Skin: Negative. Negative for color change and rash. Neurological: Negative. Negative for dizziness, seizures, syncope, speech difficulty, weakness, numbness and headaches. Hematological: Negative for adenopathy. Psychiatric/Behavioral: Negative. Negative for agitation, behavioral problems, dysphoric mood and suicidal ideas. The patient is not nervous/anxious. Physical Exam  Physical Exam   Constitutional: He is oriented to person, place, and time. He appears well-developed and well-nourished. No distress. HENT:   Head: Normocephalic and atraumatic. Mouth/Throat: Oropharynx is clear and moist. No oropharyngeal exudate. Eyes: Conjunctivae and EOM are normal. Pupils are equal, round, and reactive to light. Right eye exhibits no discharge. Left eye exhibits no discharge. Neck: Normal range of motion. Neck supple. Cardiovascular: Normal rate, regular rhythm and intact distal pulses. Exam reveals no gallop and no friction rub. No murmur heard. Pulmonary/Chest: Breath sounds normal. Accessory muscle usage present. Tachypnea noted. No respiratory distress. He has no wheezes. He has no rales. He exhibits no tenderness. Increased workup of breathing. Abdominal: Soft. Bowel sounds are normal. He exhibits no distension and no mass. There is no tenderness. There is no rebound and no guarding. Musculoskeletal: Normal range of motion. He exhibits no edema. 3+ BL lower extremity edema. Mild edema to BL upper extremity. Lymphadenopathy:     He has no cervical adenopathy. Neurological: He is alert and oriented to person, place, and time. No cranial nerve deficit. Coordination normal.   Skin: Skin is warm and dry. No rash noted. No erythema. Midline sternotomy scar. Psychiatric: He has a normal mood and affect. Nursing note and vitals reviewed. Medical Decision Making   I am the first provider for this patient. I reviewed the vital signs, available nursing notes, past medical history, past surgical history, family history and social history. Old Medical Records: Old medical records.      Provider Notes:   DDx: ACS, arrhythmia, CHF, renal failure       ED Course:  10:34 AM   Initial assessment performed. The patients presenting problems have been discussed, and they are in agreement with the care plan formulated and outlined with them. I have encouraged them to ask questions as they arise throughout their visit. Progress Notes:   CONSULT NOTE:   11:53  Yonathan Gonsalves MD spoke with Dr. Kenton Simmons,  Specialty: hostpitalist  Discussed pt's hx, disposition, and available diagnostic and imaging results. Reviewed care plans. Consultant agrees with plans as outlined. Dr. Kenton Simmons will admit pt. Written by Neto Pablo ED Scribela, as dictated by Ty Mederos. Jordyn Gonsalves MD.      Diagnostic Study Results     Labs -      Recent Results (from the past 12 hour(s))   EKG, 12 LEAD, INITIAL    Collection Time: 11/06/17 10:15 AM   Result Value Ref Range    Ventricular Rate 70 BPM    Atrial Rate 90 BPM    QRS Duration 156 ms    Q-T Interval 492 ms    QTC Calculation (Bezet) 531 ms    Calculated R Axis -110 degrees    Calculated T Axis 63 degrees    Diagnosis       Ventricular-paced rhythm  When compared with ECG of 11-MAY-2017 10:04,  No significant change was found     CBC WITH AUTOMATED DIFF    Collection Time: 11/06/17 10:43 AM   Result Value Ref Range    WBC 27.3 (H) 4.1 - 11.1 K/uL    RBC 3.80 (L) 4.10 - 5.70 M/uL    HGB 9.2 (L) 12.1 - 17.0 g/dL    HCT 30.5 (L) 36.6 - 50.3 %    MCV 80.3 80.0 - 99.0 FL    MCH 24.2 (L) 26.0 - 34.0 PG    MCHC 30.2 30.0 - 36.5 g/dL    RDW 17.3 (H) 11.5 - 14.5 %    PLATELET 154 047 - 076 K/uL    NEUTROPHILS 91 (H) 32 - 75 %    LYMPHOCYTES 4 (L) 12 - 49 %    MONOCYTES 5 5 - 13 %    EOSINOPHILS 0 0 - 7 %    BASOPHILS 0 0 - 1 %    ABS. NEUTROPHILS 24.8 (H) 1.8 - 8.0 K/UL    ABS. LYMPHOCYTES 1.1 0.8 - 3.5 K/UL    ABS. MONOCYTES 1.4 (H) 0.0 - 1.0 K/UL    ABS. EOSINOPHILS 0.0 0.0 - 0.4 K/UL    ABS.  BASOPHILS 0.0 0.0 - 0.1 K/UL    RBC COMMENTS ANISOCYTOSIS  1+        RBC COMMENTS POLYCHROMASIA  PRESENT        RBC COMMENTS HYPOCHROMIA  1+        DF SMEAR SCANNED     METABOLIC PANEL, COMPREHENSIVE    Collection Time: 11/06/17 10:43 AM   Result Value Ref Range    Sodium 129 (L) 136 - 145 mmol/L    Potassium 4.3 3.5 - 5.1 mmol/L    Chloride 93 (L) 97 - 108 mmol/L    CO2 28 21 - 32 mmol/L    Anion gap 8 5 - 15 mmol/L    Glucose 358 (H) 65 - 100 mg/dL    BUN 52 (H) 6 - 20 MG/DL    Creatinine 2.31 (H) 0.70 - 1.30 MG/DL    BUN/Creatinine ratio 23 (H) 12 - 20      GFR est AA 34 (L) >60 ml/min/1.73m2    GFR est non-AA 28 (L) >60 ml/min/1.73m2    Calcium 8.9 8.5 - 10.1 MG/DL    Bilirubin, total 2.7 (H) 0.2 - 1.0 MG/DL    ALT (SGPT) 12 12 - 78 U/L    AST (SGOT) 20 15 - 37 U/L    Alk. phosphatase 292 (H) 45 - 117 U/L    Protein, total 6.6 6.4 - 8.2 g/dL    Albumin 2.2 (L) 3.5 - 5.0 g/dL    Globulin 4.4 (H) 2.0 - 4.0 g/dL    A-G Ratio 0.5 (L) 1.1 - 2.2     MAGNESIUM    Collection Time: 11/06/17 10:43 AM   Result Value Ref Range    Magnesium 2.3 1.6 - 2.4 mg/dL   PROTHROMBIN TIME + INR    Collection Time: 11/06/17 10:43 AM   Result Value Ref Range    INR 1.6 (H) 0.9 - 1.1      Prothrombin time 16.7 (H) 9.0 - 11.1 sec   NT-PRO BNP    Collection Time: 11/06/17 10:43 AM   Result Value Ref Range    NT pro-BNP >57727 (H) 0 - 450 PG/ML   TROPONIN I    Collection Time: 11/06/17 10:43 AM   Result Value Ref Range    Troponin-I, Qt. 0.04 <0.05 ng/mL       Radiologic Studies -  The following have been ordered and reviewed:    CXR Results  (Last 48 hours)               11/06/17 1109  XR CHEST PORT Final result    Impression:  IMPRESSION:   1. Difficulty excluding mild interstitial pulmonary edema. 2. Persistent small right pleural effusion. Narrative:  EXAM: Portable CXR. 1050 hours. COMPARISON: CXR 10/27/2017 . Chest CT 11/1/2017. INDICATION: Chest Pain       Lungs are hypoexpanded without consolidation. Heart is borderline large with   prior median sternotomy. ICD remains. There is difficulty excluding a component of interstitial pulmonary edema.  Small   right pleural effusion noted on CT likely persists. Vital Signs-Reviewed the patient's vital signs. Patient Vitals for the past 12 hrs:   Temp Pulse Resp BP SpO2   11/06/17 1145 - 70 18 90/42 93 %   11/06/17 1130 - 71 18 91/42 93 %   11/06/17 1115 - 70 20 93/44 91 %   11/06/17 1100 - 70 19 95/58 95 %   11/06/17 1030 - 71 26 98/49 97 %   11/06/17 1016 - - - 94/43 99 %   11/06/17 1015 97.2 °F (36.2 °C) 70 22 - 99 %       Medications Given in the ED:  Medications   sodium chloride (NS) flush 5-10 mL (not administered)   sodium chloride (NS) flush 5-10 mL (not administered)   furosemide (LASIX) injection 80 mg (not administered)   albumin human 25% (BUMINATE) solution 12.5 g (not administered)         EKG interpretation: (Preliminary) 10:15 AM  Rhythm: ventricular paced rhythm; and regular . Rate (approx.): 70 bpm; Axis: normal; QRS interval: normal ; ST/T wave: normal;  Written by Cinthya Gaitan, ED Scribe, as dictated by Kaveh Reed. Gladys Holland MD    Diagnosis   Clinical Impression:   No diagnosis found. Plan: Admit to hospital.     Disposition Note:  11:53 AM  Patient is being admitted to the hospital by Dr. Sana Horta. The results of their tests and reasons for their admission have been discussed with them and/or available family. They convey agreement and understanding for the need to be admitted and for their admission diagnosis. Consultation has been made with the inpatient physician specialist for hospitalization. _______________________________     Attestations: This note is prepared by Cinthya Gaitan acting as Scribe for Kaveh Reed. Gladys Holland MD.      The scribe's documentation has been prepared under my direction and personally reviewed by me in its entirety. I confirm that the note above accurately reflects all work, treatment, procedures, and medical decision making performed by me. Yonathan Holland MD    _______________________________

## 2017-11-06 NOTE — PROGRESS NOTES
1305  TRANSFER - IN REPORT:    Verbal report received from Kristine(name) on [employer]  being received from ED(unit) for routine progression of care      Report consisted of patients Situation, Background, Assessment and   Recommendations(SBAR). Information from the following report(s) SBAR, Kardex, ED Summary, Procedure Summary, Intake/Output, MAR, Accordion, Recent Results, Med Rec Status and Cardiac Rhythm paced was reviewed with the receiving nurse. Opportunity for questions and clarification was provided. Assessment completed upon patients arrival to unit and care assumed. Patient BP 87/37 in ED. ED nurse stated that another 12.5 g of albumin was going to be ordered. 1320  Patient arrived on unit. No complaints of pain. At 1313 titrated dobutamine infusion order placed. Patient on the way to the unit. MD paged. New orders to transfer patient to step down unit. [address] [place] [address] to give report. Nurse to return call. Cardiology consult called. Dr. Sylvia Aleman assigned. 1425  Attempted to give report to PCU nurse. Nurse discharging patient, will call for report. Patient ready for transfer. 1446  TRANSFER - OUT REPORT:    Verbal report given to Sergo(name) on [employer].  being transferred to PCU(unit) for change in patient condition(titrated dobutamine drip)       Report consisted of patients Situation, Background, Assessment and   Recommendations(SBAR). Information from the following report(s) SBAR, Kardex, ED Summary, Procedure Summary, Intake/Output, MAR, Accordion, Recent Results, Med Rec Status and Cardiac Rhythm paced was reviewed with the receiving nurse.     Lines:   Peripheral IV 11/06/17 Right Antecubital (Active)   Site Assessment Clean, dry, & intact 11/6/2017 10:41 AM   Phlebitis Assessment 0 11/6/2017 10:41 AM   Infiltration Assessment 0 11/6/2017 10:41 AM   Dressing Status Clean, dry, & intact 11/6/2017 10:41 AM   Dressing Type Tape;Transparent 11/6/2017 10:41 AM   Hub Color/Line Status Patent; Flushed 11/6/2017 10:41 AM   Action Taken Blood drawn 11/6/2017 10:41 AM        Opportunity for questions and clarification was provided.       Patient transported with:   Monitor  Registered Nurse  Tech

## 2017-11-06 NOTE — IP AVS SNAPSHOT
[address] [place] [facility] 83. 
[phone] Patient: Brandt Perez. MRN: [id number] About your hospitalization You were admitted on:  November 6, 2017 You last received care in the:  [facility] 2 PROGRESSIVE CARE You were discharged on:  November 13, 2017 Why you were hospitalized Your primary diagnosis was:  Not on File Your diagnoses also included:  Chronic Systolic Heart Failure (Hcc), Dm (Diabetes Mellitus) (Hcc), A-Fib (Hcc), Metastatic Cancer (Hcc), Ckd (Chronic Kidney Disease), Hyponatremia Things You Need To Do (next 8 weeks) Follow up with Namrata Faria MD in 5 week(s) Phone:  [phone] Where:  [address] [place] [address] 601, [address] [id number] Friday Nov 17, 2017 INFUSION 30 with [place] INFUSION NURSE 1 at  9:00 AM  
Where:  [place] ([address]) Friday Nov 24, 2017 INFUSION 30 with [place] INFUSION NURSE 1 at  8:00 AM  
Where:  [place] ([address]) Wednesday Nov 29, 2017  
3 MONTH with Pierre Bautista MD at 10:30 AM  
Where:  [facility] [address] [facility]) Friday Dec 01, 2017 INFUSION 30 with [place] INFUSION NURSE 1 at  8:00 AM  
Where:  [place] ([address]) Friday Dec 08, 2017 INFUSION 30 with [place] INFUSION NURSE 1 at  8:00 AM  
Where:  [place] ([address]) Discharge Orders None A check ivan indicates which time of day the medication should be taken. My Medications STOP taking these medications   
 amoxicillin 500 mg capsule Commonly known as:  AMOXIL  
   
  
 apixaban 5 mg tablet Commonly known as:  ELIQUIS  
   
  
 epoetin marianela 10,000 unit/mL injection Commonly known as:  EPOGEN;PROCRIT  
   
  
 lisinopril 5 mg tablet Commonly known as:  PRINIVIL, ZESTRIL  
   
  
 OPDIVO 40 mg/4 mL Soln chemo injection Generic drug:  nivolumab  
   
  
 spironolactone 25 mg tablet Commonly known as:  ALDACTONE  
   
  
 tamsulosin 0.4 mg capsule Commonly known as:  FLOMAX TAKE these medications as instructed Instructions Each Dose to Equal  
 Morning Noon Evening Bedtime  
 amiodarone 200 mg tablet Commonly known as:  CORDARONE Your last dose was: Your next dose is: TAKE ONE TABLET BY MOUTH ONCE DAILY  
     
   
   
   
  
 bumetanide 1 mg tablet Commonly known as:  Beverly Nicki Your last dose was: Your next dose is: Take 1 tab daily, increase to one tab 2x a day if weight up 2lbs or more in 24hrs, 5lbs or more in a week  
     
   
   
   
  
 carvedilol 25 mg tablet Commonly known as:  Henry Brandt Your last dose was: Your next dose is: Take 25 mg by mouth two (2) times daily (with meals). 25 mg  
    
   
   
   
  
 insulin NPH/insulin regular 100 unit/mL (70-30) injection Commonly known as:  NOVOLIN 70/30, HUMULIN 70/30 Your last dose was: Your next dose is:    
   
   
 30 Units by SubCUTAneous route every evening. Insulin Administration Instructions:  40 units SUBQ daily (morning before breakfast) 30 units SUBQ evening (before dinner) 30 Units ASK your physician about these medications Instructions Each Dose to Equal  
 Morning Noon Evening Bedtime OPDIVO IV Your last dose was: Your next dose is:    
   
   
 by IntraVENous route. Discharge Instructions None Introducing \[facility] [place] [facility]\"" & HEALTH SERVICES! Jil Robledo introduces [employer] patient portal. Now you can access parts of your medical record, email your doctor's office, and request medication refills online.    
 
1. In your internet browser, go to [email or web address] 2. Click on the First Time User? Click Here link in the Sign In box. You will see the New Member Sign Up page. 3. Enter your [email or web address] Access Code exactly as it appears below. You will not need to use this code after youve completed the sign-up process. If you do not sign up before the expiration date, you must request a new code. · [email or web address] Access Code: [email or web address] Expires: 1/17/2018  8:16 AM 
 
4. Enter the last four digits of your Social Security Number (xxxx) and Date of Birth (mm/dd/yyyy) as indicated and click Submit. You will be taken to the next sign-up page. 5. Create a [email or web address] ID. This will be your [email or web address] login ID and cannot be changed, so think of one that is secure and easy to remember. 6. Create a [email or web address] password. You can change your password at any time. 7. Enter your Password Reset Question and Answer. This can be used at a later time if you forget your password. 8. Enter your e-mail address. You will receive e-mail notification when new information is available in [address] 9. Click Sign Up. You can now view and download portions of your medical record. 10. Click the Download Summary menu link to download a portable copy of your medical information. If you have questions, please visit the Frequently Asked Questions section of the [email or web address] website. Remember, [email or web address] is NOT to be used for urgent needs. For medical emergencies, dial 911. Now available from your iPhone and Android! Providers Seen During Your Hospitalization Provider Specialty Primary office phone Jeanmarie Velez MD Emergency Medicine [phone] Susan Rodriguez MD Internal Medicine [phone] Oma Hearn MD Internal Medicine [phone] Your Primary Care Physician (PCP) Primary Care Physician Office Phone Office Fax Jose Poster [phone] You are allergic to the following No active allergies Recent Documentation Height Weight BMI Smoking Status 1.753 m 96.9 kg 31.55 kg/m2 Former Smoker Emergency Contacts Name Discharge Info Relation Home Work Mobile [facility] DISCHARGE CAREGIVER [3]  [23] [phone] Arya Rivera [24] [phone] Patient Belongings The following personal items are in your possession at time of discharge: 
  Dental Appliances: None  Visual Aid: None      Home Medications: None   Jewelry: None  Clothing: None    Other Valuables: None Discharge Instructions Attachments/References HEART FAILURE ZONES: GENERAL INFO (ENGLISH) HEART FAILURE: AVOIDING TRIGGERS (ENGLISH) Patient Handouts Learning About Heart Failure Zones What are heart failure zones? Heart failure zones give you an easy way to see changes in your heart failure symptoms. They also tell you when you need to get help. Check every day to see which zone you are in. Green zone. You are doing well. This is where you want to be. · Your weight is stable. This means it is not going up or down. · You breathe easily. · You are sleeping well. You are able to lie flat without shortness of breath. · You can do your usual activities. Yellow zone. Be careful. Your symptoms are changing. Call your doctor. · You have new or increased shortness of breath. · You are dizzy or lightheaded, or you feel like you may faint. · You have sudden weight gain, such as more than 2 to 3 pounds in a day or 5 pounds in a week. (Your doctor may suggest a different range of weight gain.) · You have increased swelling in your legs, ankles, or feet. · You are so tired or weak that you cannot do your usual activities. · You are not sleeping well. Shortness of breath wakes you up at night. You need extra pillows. Your doctor's name: ____________________________________________________________ Your doctor's contact information: _________________________________________________ Red zone. This is an emergency. Call 911. You have symptoms of sudden heart failure, such as: 
· You have severe trouble breathing. · You cough up pink, foamy mucus. · You have a new irregular or fast heartbeat. You have symptoms of a heart attack. These may include: · Chest pain or pressure, or a strange feeling in the chest. 
· Sweating. · Shortness of breath. · Nausea or vomiting. · Pain, pressure, or a strange feeling in the back, neck, jaw, or upper belly or in one or both shoulders or arms. · Lightheadedness or sudden weakness. · A fast or irregular heartbeat. If you have symptoms of a heart attack: After you call 911, the  may tell you to chew 1 adult-strength or 2 to 4 low-dose aspirin. Wait for an ambulance. Do not try to drive yourself. Follow-up care is a key part of your treatment and safety. Be sure to make and go to all appointments, and call your doctor if you are having problems. It's also a good idea to know your test results and keep a list of the medicines you take. Where can you learn more? Go to http://too-amber.info/. Enter T174 in the search box to learn more about \"Learning About Heart Failure Zones. \" Current as of: September 21, 2016 Content Version: 11.4 © 7962-5089 [employer]. Care instructions adapted under license by [employer] (which disclaims liability or warranty for this information). If you have questions about a medical condition or this instruction, always ask your healthcare professional. [address] any warranty or liability for your use of this information. Avoiding Triggers With Heart Failure: Care Instructions Your Care Instructions Triggers are anything that make your heart failure flare up. A flare-up is also called \"sudden heart failure\" or \"acute heart failure. \" When you have a flare-up, fluid builds up in your lungs, and you have problems breathing. You might need to go to the hospital. By watching for changes in your condition and avoiding triggers, you can prevent heart failure flare-ups. Follow-up care is a key part of your treatment and safety. Be sure to make and go to all appointments, and call your doctor if you are having problems. It's also a good idea to know your test results and keep a list of the medicines you take. How can you care for yourself at home? Watch for changes in your weight and condition · Weigh yourself without clothing at the same time each day. Record your weight. Call your doctor if you have sudden weight gain, such as more than 2 to 3 pounds in a day or 5 pounds in a week. (Your doctor may suggest a different range of weight gain.) A sudden weight gain may mean that your heart failure is getting worse. · Keep a daily record of your symptoms. Write down any changes in how you feel, such as new shortness of breath, cough, or problems eating. Also record if your ankles are more swollen than usual and if you feel more tired than usual. Note anything that you ate or did that could have triggered these changes. Limit sodium Sodium causes your body to hold on to extra water. This may cause your heart failure symptoms to get worse. People get most of their sodium from processed foods. Fast food and restaurant meals also tend to be very high in sodium. · Your doctor may suggest that you limit sodium to 2,000 milligrams (mg) a day or less. That is less than 1 teaspoon of salt a day, including all the salt you eat in cooking or in packaged foods. · Read food labels on cans and food packages. They tell you how much sodium you get in one serving. Check the serving size. If you eat more than one serving, you are getting more sodium.  
· Be aware that sodium can come in forms other than salt, including monosodium glutamate (MSG), sodium citrate, and sodium bicarbonate (baking soda). MSG is often added to Asian food. You can sometimes ask for food without MSG or salt. · Slowly reducing salt will help you adjust to the taste. Take the salt shaker off the table. · Flavor your food with garlic, lemon juice, onion, vinegar, herbs, and spices instead of salt. Do not use soy sauce, steak sauce, onion salt, garlic salt, mustard, or ketchup on your food, unless it is labeled \"low-sodium\" or \"low-salt. \" 
· Make your own salad dressings, sauces, and ketchup without adding salt. · Use fresh or frozen ingredients, instead of canned ones, whenever you can. Choose low-sodium canned goods. · Eat less processed food and food from restaurants, including fast food. Exercise as directed Moderate, regular exercise is very good for your heart. It improves your blood flow and helps control your weight. But too much exercise can stress your heart and cause a heart failure flare-up. · Check with your doctor before you start an exercise program. 
· Walking is an easy way to get exercise. Start out slowly. Gradually increase the length and pace of your walk. Swimming, riding a bike, and using a treadmill are also good forms of exercise. · When you exercise, watch for signs that your heart is working too hard. You are pushing yourself too hard if you cannot talk while you are exercising. If you become short of breath or dizzy or have chest pain, stop, sit down, and rest. 
· Do not exercise when you do not feel well. Take medicines correctly · Take your medicines exactly as prescribed. Call your doctor if you think you are having a problem with your medicine. · Make a list of all the medicines you take. Include those prescribed to you by other doctors and any over-the-counter medicines, vitamins, or supplements you take. Take this list with you when you go to any doctor. · Take your medicines at the same time every day. It may help you to post a list of all the medicines you take every day and what time of day you take them. · Make taking your medicine as simple as you can. Plan times to take your medicines when you are doing other things, such as eating a meal or getting ready for bed. This will make it easier to remember to take your medicines. · Get organized. Use helpful tools, such as daily or weekly pill containers. When should you call for help? Call 911 if you have symptoms of sudden heart failure such as: 
? · You have severe trouble breathing. ? · You cough up pink, foamy mucus. ? · You have a new irregular or rapid heartbeat. ?Call your doctor now or seek immediate medical care if: 
? · You have new or increased shortness of breath. ? · You are dizzy or lightheaded, or you feel like you may faint. ? · You have sudden weight gain, such as more than 2 to 3 pounds in a day or 5 pounds in a week. (Your doctor may suggest a different range of weight gain.) ? · You have increased swelling in your legs, ankles, or feet. ? · You are suddenly so tired or weak that you cannot do your usual activities. ? Watch closely for changes in your health, and be sure to contact your doctor if you develop new symptoms. Where can you learn more? Go to http://too-amber.info/. Enter E231 in the search box to learn more about \"Avoiding Triggers With Heart Failure: Care Instructions. \" Current as of: September 21, 2016 Content Version: 11.4 © 3695-7563 [employer]. Care instructions adapted under license by [employer] (which disclaims liability or warranty for this information). If you have questions about a medical condition or this instruction, always ask your healthcare professional. [address] any warranty or liability for your use of this information. Please provide this summary of care documentation to your next provider. Signatures-by signing, you are acknowledging that this After Visit Summary has been reviewed with you and you have received a copy. Patient Signature:  ____________________________________________________________ Date:  ____________________________________________________________  
  
[facility] Provider Signature:  ____________________________________________________________ Date:  ____________________________________________________________

## 2017-11-06 NOTE — PROGRESS NOTES
Pharmacy Clarification of the Prior to Admission Medication Regimen Retrospective to the Admission Medication Reconciliation    The patient was interviewed regarding clarification of the prior to admission medication regimen. Brother was present in room and obtained permission from patient to discuss drug regimen with visitor(s) present. Patient was questioned regarding use of any other inhalers, topical products, over the counter medications, herbal medications, vitamin products or ophthalmic/nasal/otic medication use. Information Obtained From: Patient, Patient's Brother, RX Query    Recommendations/Findings: The following amendments were made to the patient's active medication list on file at [address]:     1) Additions:    tamsulosin (FLOMAX) 0.4 mg capsule   amoxicillin (AMOXIL) 500 mg capsule   epoetin marianela (EPOGEN;PROCRIT) 10,000 unit/mL injection   nivolumab (OPDIVO) 40 mg/4 mL soln chemo injection    2) Removals:    Atorvastatin 20 mg tab   Macrobid 100 mg cap    3) Changes:   insulin NPH/insulin regular (NOVOLIN 70/30, HUMULIN 70/30) 100 unit/mL (70-30) injection (Old regimen: 50 units before breakfast and 35 units before dinner /New regimen: 40 units before breakfast and 30 units before dinner)    4) Pertinent Pharmacy Findings:   amoxicillin (AMOXIL) 500 mg capsule: Patient started a 10 day regimen on 11/2/2017. Patient has completed  3 days and 1 dose of therapy as of 11/6/2017.  bumetanide (BUMEX) 1 mg tablet: Patient stated that per the patient's oncologist (Dr. Graciela Hinds), patient was to stop taking this agent '2 weeks ago' on 10/23/2017.  lisinopril (PRINIVIL, ZESTRIL) 5 mg tablet: Patient stated that per the patient's oncologist (Dr. Graciela Hinds), patient was to stop taking this agent '2 weeks ago' on 10/23/2017.  Identified High Alert Medication Information  o epoetin marianela (EPOGEN;PROCRIT) 10,000 unit/mL injection: [facility] Patient stated that he last received this agent on 10/27/2017.  o Current Anticoagulants:  - Name: apixaban (ELIQUIS) 5 mg tablet  - Patient stated that per the patient's oncologist (Dr. Cecelia Gillespie), patient was to stop taking this agent '2 weeks ago' on 10/23/2017.  o Current IV Chemotherapy Regimen:  - Oncologist: Dr. Cecelia Gillespie  - nivolumab (OPDIVO) 40 mg/4 mL soln chemo injection  - Location receiving chemotherapy: [facility]  - Patient stated that he receives infusion every other Friday. Patient stated that he last received agent on 10/13/2017. PTA medication list was corrected to the following:     Prior to Admission Medications   Prescriptions Last Dose Informant Patient Reported? Taking?   amiodarone (CORDARONE) 200 mg tablet 2017 at Unknown time Self No Yes   Sig: TAKE ONE TABLET BY MOUTH ONCE DAILY   amoxicillin (AMOXIL) 500 mg capsule 2017 at Unknown time Self Yes Yes   Sig: Take 500 mg by mouth two (2) times a day. apixaban (ELIQUIS) 5 mg tablet 10/23/2017 at Unknown  Self No No   Sig: Take one tablet by mouth twice daily. bumetanide (BUMEX) 1 mg tablet 10/23/2017 at Unknown  Self No No   Sig: Take 1 tab daily, increase to one tab 2x a day if weight up 2lbs or more in 24hrs, 5lbs or more in a week   carvedilol (COREG) 25 mg tablet 2017 at Unknown time Self Yes Yes   Sig: Take 25 mg by mouth two (2) times daily (with meals). epoetin marianela (EPOGEN;PROCRIT) 10,000 unit/mL injection 10/27/2017 at Unknown time Other Yes Yes   Sig: 10,000 Units by SubCUTAneous route every seven (7) days. Patient receives injection every Friday. insulin NPH/insulin regular (NOVOLIN 70/30, HUMULIN 70/30) 100 unit/mL (70-30) injection 2017 at Unknown time Self Yes Yes   Si Units by SubCUTAneous route Daily (before breakfast).  Insulin Administration Instructions:    40 units SUBQ daily (morning before breakfast)  30 units SUBQ evening (before dinner)   insulin NPH/insulin regular (NOVOLIN 70/30, HUMULIN 70/30) 100 unit/mL (70-30) injection 2017 at Unknown time Self Yes Yes   Si Units by SubCUTAneous route every evening. Insulin Administration Instructions:    40 units SUBQ daily (morning before breakfast)  30 units SUBQ evening (before dinner)   lisinopril (PRINIVIL, ZESTRIL) 5 mg tablet 10/23/2017 at Insulin Administration Instructions: Self Yes No   Sig: Take 5 mg by mouth daily. nivolumab (OPDIVO) 40 mg/4 mL soln chemo injection 10/13/2017 at Unknown time Other Yes Yes   Si mg by IntraVENous route once. Infuse 134 mL/hour over 60 minutes. spironolactone (ALDACTONE) 25 mg tablet 2017 at Unknown time Self Yes Yes   Sig: Take 25 mg by mouth daily. tamsulosin (FLOMAX) 0.4 mg capsule 2017 at Unknown time Self Yes Yes   Sig: Take 0.4 mg by mouth daily.       Facility-Administered Medications: None          Thank you,  Shayla Ellsworth, hT  Medication History Pharmacy Technician

## 2017-11-06 NOTE — ED NOTES
Pt. Pressure still remaining low after initial dose of albumin given. Spoke with Dr. Franci Steel. To order another dose of albumin at this time.

## 2017-11-06 NOTE — PROGRESS NOTES
TRANSFER - IN REPORT:    Verbal report received from Juan Grullon RN(name) on [employer]  being received from [facility] (unit) for urgent transfer      Report consisted of patients Situation, Background, Assessment and   Recommendations(SBAR). Information from the following report(s) SBAR, Kardex, ED Summary, Procedure Summary, Intake/Output, MAR, Recent Results, Med Rec Status, Cardiac Rhythm AV paced and Alarm Parameters  was reviewed with the receiving nurse. Opportunity for questions and clarification was provided. Assessment completed upon patients arrival to unit and care assumed.        1508--Primary Nurse Narcisa Romero, GINO and Lara Campbell, RN performed a dual skin assessment on this patient No impairment noted  Panfilo score is 17

## 2017-11-06 NOTE — H&P
Hospitalist Admission Note    NAME: Elise Jenkins. :  1940   MRN:  [id number]     Date/Time:  2017 1:13 PM    Patient PCP: Rohini Acosta MD  ________________________________________________________________________    My assessment of this patient's clinical condition and my plan of care is as follows. Assessment / Plan:  Acute on chronic systolic heart failure s/p BIV-ICD  Ischemic cardiomyopathy  Persistent Atrial fibrillation  Hypotension   -diuretic discontinued 2 weeks ago due to worsening of renal failure  -cxr showed pulmonary edema with small R pleural effusion, trop neg, probnp 30K  -last Echo showed EF15-20%  -BP low despite albumin given in the ER. Will order dobutamine gtt for aggressive diuresing   -IV lasix 40mg BID, amiodarone, statin  -off Eliquis due to hx of GI bleed?, denies any evidence of bleeding currently  -hold other antihypertensives due to hypotension  -monitor lytes, daily wt, fluid restriction  -I spoke to cardiologist Dr Braulio Quezada who will see pt today    Metastatic hypernephroma to bone, possibly nodes in the chest  Leukocytosis   -on Opdivo at infusion center, currently on hold  -follows by Dr Ann Avendaño  -consult oncology    T2DM  -resuming home insulin at lower dose  -SSI    Hyponatremia, likely due to vol overload, monitor   CKD 4  -cr at baseline  -monitor for now    Code Status: full  Surrogate Decision Maker: pt's brother  DVT Prophylaxis: heparin for now  GI Prophylaxis: not indicated          Subjective:   CHIEF COMPLAINT: sob    HISTORY OF PRESENT ILLNESS:     Rosario Gray is a 78 y.o.  male w pmhs significant for systolic heart failure s/p BIV-ICD, renal CA with mets, present to the ED c/o of worsening of b/l LE edema and about ~20lbs wt gain in the last several days. As per pt, he was recently taken off bumex about 2 weeks ago due to worsening of renal failure. He started noticing wt gain, now with worsening of b/l LE edema. He states he feels sob intermittently, but denies any cp, palpitations, n/v/d. No melena/BRBPR recently. Pt also states he was recently taken off eliquis a few weeks ago. In the ED, vitals: T97.2, P70, BP 97/37, SpO2 99% on RA  CXR showed pulmonary edema/R pleural effusion  Labs: probnp 30K, cr 2.31, Na 129, wbc 27, hgb 9.2  We were asked to admit for work up and evaluation of the above problems. Past Medical History:   Diagnosis Date    CAD (coronary artery disease)     h/o CABG    [facility]     kidney cancer diagnosed Feb 2017    Chronic systolic heart failure (HCC)     Coronary atherosclerosis of native coronary artery     Diabetes [facility]     Heart failure ([facility]     Hypertension     Other acute and subacute form of ischemic heart disease     Other ill-defined conditions(799.89)     elevated cholesterol    Paroxysmal ventricular tachycardia (HCC)     Presence of biventricular automatic cardioverter/defibrillator (AICD) 5/2/2016 5/2/16 [employer] upgrade to biventricular AICD implant    S/P ablation of atrial fibrillation 1/8/2016        Past Surgical History:   Procedure Laterality Date    CARDIAC SURG PROCEDURE UNLIST  2005    CABG    COLONOSCOPY N/A 8/28/2017    [facility]    HX PACEMAKER         Social History   Substance Use Topics    Smoking status: Former Smoker     Quit date: 1/8/2005    Smokeless tobacco: Former User     Quit date: 4/9/2011    Alcohol use No        Family History   Problem Relation Age of Onset    Heart Disease Mother     Hypertension Mother     Diabetes Mother     Diabetes Father     Heart Disease Father     Hypertension Father      No Known Allergies     Prior to Admission medications    Medication Sig Start Date End Date Taking? Authorizing Provider   carvedilol (COREG) 25 mg tablet Take 25 mg by mouth two (2) times daily (with meals).    Yes Historical Provider   insulin NPH/insulin regular (NOVOLIN 70/30, HUMULIN 70/30) 100 unit/mL (70-30) injection 30 Units by SubCUTAneous route every evening. Insulin Administration Instructions:    40 units SUBQ daily (morning before breakfast)  30 units SUBQ evening (before dinner)   Yes Historical Provider   spironolactone (ALDACTONE) 25 mg tablet Take 25 mg by mouth daily. Yes Historical Provider   amoxicillin (AMOXIL) 500 mg capsule Take 500 mg by mouth two (2) times a day. Yes Historical Provider   tamsulosin (FLOMAX) 0.4 mg capsule Take 0.4 mg by mouth daily. Yes Historical Provider   amiodarone (CORDARONE) 200 mg tablet TAKE ONE TABLET BY MOUTH ONCE DAILY 9/11/17  Yes Rody Matute MD   insulin NPH/insulin regular (NOVOLIN 70/30, HUMULIN 70/30) 100 unit/mL (70-30) injection 40 Units by SubCUTAneous route Daily (before breakfast). Insulin Administration Instructions:    40 units SUBQ daily (morning before breakfast)  30 units SUBQ evening (before dinner)   Yes Historical Provider   apixaban (ELIQUIS) 5 mg tablet Take one tablet by mouth twice daily. 10/25/17   Rody Matute MD   lisinopril (PRINIVIL, ZESTRIL) 5 mg tablet Take 5 mg by mouth daily. Historical Provider   bumetanide (BUMEX) 1 mg tablet Take 1 tab daily, increase to one tab 2x a day if weight up 2lbs or more in 24hrs, 5lbs or more in a week 5/25/17   Jeremy Del Cid NP       REVIEW OF SYSTEMS:     I am not able to complete the review of systems because:    The patient is intubated and sedated    The patient has altered mental status due to his acute medical problems    The patient has baseline aphasia from prior stroke(s)    The patient has baseline dementia and is not reliable historian    The patient is in acute medical distress and unable to provide information           Total of 12 systems reviewed as follows:       POSITIVE= BOLD text  Negative = text not underlined  General:  fever, chills, sweats, generalized weakness, weight loss/ wt gain,      loss of appetite Eyes:    blurred vision, eye pain, loss of vision, double vision  ENT:    rhinorrhea, pharyngitis   Respiratory:   cough, sputum production, SOB, WILSON, wheezing, pleuritic pain   Cardiology:   chest pain, palpitations, orthopnea, PND, edema, syncope   Gastrointestinal:  abdominal pain , N/V, diarrhea, dysphagia, constipation, bleeding   Genitourinary:  frequency, urgency, dysuria, hematuria, incontinence   Muskuloskeletal :  arthralgia, myalgia, back pain  Hematology:  easy bruising, nose or gum bleeding, lymphadenopathy   Dermatological: rash, ulceration, pruritis, color change / jaundice  Endocrine:   hot flashes or polydipsia   Neurological:  headache, dizziness, confusion, focal weakness, paresthesia,     Speech difficulties, memory loss, gait difficulty  Psychological: Feelings of anxiety, depression, agitation    Objective:   VITALS:    Visit Vitals    BP (!) 87/37    Pulse 70    Temp 97.2 °F (36.2 °C)    Resp 23    Ht 5' 9\" (1.753 m)    Wt 105.2 kg (231 lb 14.8 oz)    SpO2 96%    BMI 34.25 kg/m2       PHYSICAL EXAM:    General:    Alert, cooperative, no distress, appears stated age. HEENT: Atraumatic, anicteric sclerae, pink conjunctivae     No oral ulcers, mucosa moist, throat clear, dentition fair  Neck:  Supple, symmetrical,  thyroid: non tender  Lungs:   Crackles at lung bases,  No Wheezing or Rhonchi. No rales. Chest wall:  Mid sternal scar seen  Heart:   Regular  rhythm,  No  Murmur,  +3 b/l LE pitting edema  Abdomen:   Soft, non-tender. Not distended. Bowel sounds normal  Extremities: No cyanosis. No clubbing,      Skin turgor normal, Capillary refill normal, Radial dial pulse 2+  Skin:     Not pale. Not Jaundiced  No rashes   Psych:  Not anxious or agitated.   Neurologic: AAOx4, conversant, sensation grossly intact, moving all exts    _______________________________________________________________________  Care Plan discussed with:    Comments   Patient x    Family      RN x    Care Manager                    Consultant:  katherine Doll   _______________________________________________________________________  Expected  Disposition:   Home with Family    HH/PT/OT/RN x   SNF/LTC    TREVOR    ________________________________________________________________________  TOTAL TIME:   Minutes    Critical Care Provided 39    Minutes non procedure based      Comments    x Reviewed previous records   >50% of visit spent in counseling and coordination of care x Discussion with patient and/or family and questions answered       ________________________________________________________________________  Signed: Oma Hearn MD    Procedures: see electronic medical records for all procedures/Xrays and details which were not copied into this note but were reviewed prior to creation of Plan. LAB DATA REVIEWED:    Recent Results (from the past 24 hour(s))   EKG, 12 LEAD, INITIAL    Collection Time: 11/06/17 10:15 AM   Result Value Ref Range    Ventricular Rate 70 BPM    Atrial Rate 90 BPM    QRS Duration 156 ms    Q-T Interval 492 ms    QTC Calculation (Bezet) 531 ms    Calculated R Axis -110 degrees    Calculated T Axis 63 degrees    Diagnosis       Ventricular-paced rhythm  When compared with ECG of 11-MAY-2017 10:04,  No significant change was found  Confirmed by Sumit Cordova ([id number]) on 11/6/2017 12:16:55 PM     CBC WITH AUTOMATED DIFF    Collection Time: 11/06/17 10:43 AM   Result Value Ref Range    WBC 27.3 (H) 4.1 - 11.1 K/uL    RBC 3.80 (L) 4.10 - 5.70 M/uL    HGB 9.2 (L) 12.1 - 17.0 g/dL    HCT 30.5 (L) 36.6 - 50.3 %    MCV 80.3 80.0 - 99.0 FL    MCH 24.2 (L) 26.0 - 34.0 PG    MCHC 30.2 30.0 - 36.5 g/dL    RDW 17.3 (H) 11.5 - 14.5 %    PLATELET 502 745 - 325 K/uL    NEUTROPHILS 91 (H) 32 - 75 %    LYMPHOCYTES 4 (L) 12 - 49 %    MONOCYTES 5 5 - 13 %    EOSINOPHILS 0 0 - 7 %    BASOPHILS 0 0 - 1 %    ABS. NEUTROPHILS 24.8 (H) 1.8 - 8.0 K/UL    ABS. LYMPHOCYTES 1.1 0.8 - 3.5 K/UL    ABS.  MONOCYTES 1.4 (H) 0.0 - 1.0 K/UL    ABS. EOSINOPHILS 0.0 0.0 - 0.4 K/UL    ABS. BASOPHILS 0.0 0.0 - 0.1 K/UL    RBC COMMENTS ANISOCYTOSIS  1+        RBC COMMENTS POLYCHROMASIA  PRESENT        RBC COMMENTS HYPOCHROMIA  1+        DF SMEAR SCANNED     METABOLIC PANEL, COMPREHENSIVE    Collection Time: 11/06/17 10:43 AM   Result Value Ref Range    Sodium 129 (L) 136 - 145 mmol/L    Potassium 4.3 3.5 - 5.1 mmol/L    Chloride 93 (L) 97 - 108 mmol/L    CO2 28 21 - 32 mmol/L    Anion gap 8 5 - 15 mmol/L    Glucose 358 (H) 65 - 100 mg/dL    BUN 52 (H) 6 - 20 MG/DL    Creatinine 2.31 (H) 0.70 - 1.30 MG/DL    BUN/Creatinine ratio 23 (H) 12 - 20      GFR est AA 34 (L) >60 ml/min/1.73m2    GFR est non-AA 28 (L) >60 ml/min/1.73m2    Calcium 8.9 8.5 - 10.1 MG/DL    Bilirubin, total 2.7 (H) 0.2 - 1.0 MG/DL    ALT (SGPT) 12 12 - 78 U/L    AST (SGOT) 20 15 - 37 U/L    Alk.  phosphatase 292 (H) 45 - 117 U/L    Protein, total 6.6 6.4 - 8.2 g/dL    Albumin 2.2 (L) 3.5 - 5.0 g/dL    Globulin 4.4 (H) 2.0 - 4.0 g/dL    A-G Ratio 0.5 (L) 1.1 - 2.2     MAGNESIUM    Collection Time: 11/06/17 10:43 AM   Result Value Ref Range    Magnesium 2.3 1.6 - 2.4 mg/dL   PROTHROMBIN TIME + INR    Collection Time: 11/06/17 10:43 AM   Result Value Ref Range    INR 1.6 (H) 0.9 - 1.1      Prothrombin time 16.7 (H) 9.0 - 11.1 sec   NT-PRO BNP    Collection Time: 11/06/17 10:43 AM   Result Value Ref Range    NT pro-BNP >00714 (H) 0 - 450 PG/ML   TROPONIN I    Collection Time: 11/06/17 10:43 AM   Result Value Ref Range    Troponin-I, Qt. 0.04 <0.05 ng/mL

## 2017-11-06 NOTE — ED TRIAGE NOTES
Pt. Presents to ED today for complaints of \"swelling all over\" that has gotten worse over the past few days. PT. Reports that he has kidney CA and spoke with Dr. Yodit Gotti who sent him here. Pt. Alert and oriented x4. Pt. Placed on monitor x3 in position of comfort with call bell in reach.

## 2017-11-07 NOTE — PROGRESS NOTES
Problem: Mobility Impaired (Adult and Pediatric)  Goal: *Acute Goals and Plan of Care (Insert Text)  Physical Therapy Goals  Initiated 11/7/2017  1. Patient will move from supine to sit and sit to supine  and roll side to side in bed with independence within 7 day(s). 2.  Patient will transfer from bed to chair and chair to bed with independence using the least restrictive device within 7 day(s). 3.  Patient will perform sit to stand with independence within 7 day(s). 4.  Patient will ambulate with modified independence for 100 feet with the least restrictive device within 7 day(s). 5.  Patient will ascend/descend 3 stairs with 2 handrail(s) with modified independence within 7 day(s). physical Therapy EVALUATION  Patient: Elise Cortez (71 y.o. male)  Date: 11/7/2017  Primary Diagnosis: Heart failure (HCC)        Precautions:   Fall, Skin    ASSESSMENT :  Based on the objective data described below, the patient presents with decreased activity tolerance, generalized weakness, impaired balance and gait mechanics, hypotension, LE edema, bowel incontinence and high fall risk following admission for heart failure. Pt slightly confused regarding home situation given subjective history between PT/OT evals. Pt reported to PT he uses a wheelchair throughout the day while reported he did not own a wheelchair with OT. Wheelchair present in room during eval and pt reported to this writer it was his. Overall Min Ax1-2 for bed mobility, transfers and short gait training. Upon coming to EOB with Min A pt found to have had BM unbeknownst to the pt. Pt stood for 5 minutes for hygiene with Total A. Fair balance at [employer]. Pt then ambulated around the end of bed to wheelchair with Min A to navigate RW and stabilize pt. Verbal cues to maintain close proximity and then to safely descend into chair. Propped LEs onto foam wedge for elevation due to swelling.  Pt is below his baseline level of function however he reports he only ambulates very short distances with RW at home. Pending progress anticipate pt will be safe to return home with [facility] therapy and assistance from family members otherwise he may benefit from short rehab stay to increase independence. Pt is in agreement. Nephew present during evaluation however provided minimal information. Patient will benefit from skilled intervention to address the above impairments. Patients rehabilitation potential is considered to be Fair  Factors which may influence rehabilitation potential include:   []         None noted  []         Mental ability/status  [x]         Medical condition  []         Home/family situation and support systems  []         Safety awareness  []         Pain tolerance/management  []         Other:      PLAN :  Recommendations and Planned Interventions:  [x]           Bed Mobility Training             [x]    Neuromuscular Re-Education  [x]           Transfer Training                   []    Orthotic/Prosthetic Training  [x]           Gait Training                         []    Modalities  [x]           Therapeutic Exercises           []    Edema Management/Control  [x]           Therapeutic Activities            [x]    Patient and Family Training/Education  []           Other (comment):    Frequency/Duration: Patient will be followed by physical therapy  4 times a week to address goals. Discharge Recommendations: Home Health and To Be Determined  Further Equipment Recommendations for Discharge: None at this time     SUBJECTIVE:   Patient stated I can't walk.  (since Friday)    OBJECTIVE DATA SUMMARY:   HISTORY:    Past Medical History:   Diagnosis Date    CAD (coronary artery disease)     h/o CABG    [facility] ([facility] [address]     kidney cancer diagnosed Feb 2017    Chronic systolic heart failure (HCC)     Coronary atherosclerosis of native coronary artery     Diabetes ([facility]     [facility]     Hypertension     Other acute and subacute form of ischemic heart disease     Other ill-defined conditions(799.89)     elevated cholesterol    Paroxysmal ventricular tachycardia (HCC)     Presence of biventricular automatic cardioverter/defibrillator (AICD) 5/2/2016 5/2/16 [employer] upgrade to biventricular AICD implant    S/P ablation of atrial fibrillation 1/8/2016     Past Surgical History:   Procedure Laterality Date    CARDIAC SURG PROCEDURE UNLIST  2005    CABG    COLONOSCOPY N/A 8/28/2017    COLONOSCOPY performed by Michelet Rankin MD at [facility] AMBULATORY OR    HX PACEMAKER       Prior Level of Function/Home Situation: Lives with sister, does not drive, ambulates short distance with RW-able to bathe, dress and perform most ADLs independently  Personal factors and/or comorbidities impacting plan of care: CAH, HF, cancer    Home Situation  Home Environment: Private residence  # Steps to Enter: 3  Rails to Enter: Yes  One/Two Story Residence: One story  Living Alone: No  Support Systems: Family member(s)  Patient Expects to be Discharged to[de-identified] Private residence  Current DME Used/Available at Home: [employer], straight, Walker, rolling, Wheelchair    EXAMINATION/PRESENTATION/DECISION MAKING:   Critical Behavior:  Neurologic State: Alert  Orientation Level: Oriented X4        Hearing:   Auditory  Auditory Impairment: None    Edema: LE edema  Range Of Motion:  AROM: Generally decreased, functional                       Strength:    Strength: Generally decreased, functional                    Tone & Sensation:   Tone: Normal              Sensation: Intact               Coordination:  Coordination: Generally decreased, functional  Vision:      Functional Mobility:  Bed Mobility:     Supine to Sit: Assist x1;Additional time;Minimum assistance        Transfers:  Sit to Stand: Minimum assistance;Assist x2  Stand to Sit: Minimum assistance;Assist x2        Bed to Chair: Moderate assistance              Balance:   Sitting: Intact  Standing: Impaired  Standing - Static: Good;Constant support (RW)  Standing - Dynamic : Fair  Ambulation/Gait Training:  Distance (ft): 20 Feet (ft)  Assistive Device: Gait belt;Walker, rolling  Ambulation - Level of Assistance: Minimal assistance;Assist x1;Additional time        Gait Abnormalities: Decreased step clearance; Path deviations;Trunk sway increased        Base of Support: Widened     Speed/Paulina: Pace decreased (<100 feet/min)  Step Length: Right shortened;Left shortened             Functional Measure:  Tinetti test:    Sitting Balance: 1  Arises: 0  Attempts to Rise: 1  Immediate Standing Balance: 1  Standing Balance: 1  Nudged: 2  Eyes Closed: 1  Turn 360 Degrees - Continuous/Discontinuous: 1  Turn 360 Degrees - Steady/Unsteady: 1  Sitting Down: 0  Balance Score: 9  Indication of Gait: 0  R Step Length/Height: 1  L Step Length/Height: 1  R Foot Clearance: 1  L Foot Clearance: 1  Step Symmetry: 1  Step Continuity: 1  Path: 0  Trunk: 0  Walking Time: 0  Gait Score: 6  Total Score: 15       Tinetti Test and G-code impairment scale:  Percentage of Impairment CH    0%   CI    1-19% CJ    20-39% CK    40-59% CL    60-79% CM    80-99% CN     100%   Tinetti  Score 0-28 28 23-27 17-22 12-16 6-11 1-5 0       Tinetti Tool Score Risk of Falls  <19 = High Fall Risk  19-24 = Moderate Fall Risk  25-28 = Low Fall Risk  Tinetti ME. Performance-Oriented Assessment of Mobility Problems in Elderly Patients. Rodney 66; [id number]. (Scoring Description: PT Bulletin Feb. 10, 1993)    Older adults: Andre Barnes et al, 2009; n = 1000 [facility] elderly evaluated with ABC, ERICH, ADL, and IADL)  · Mean ERICH score for males aged 69-68 years = 26.21(3.40)  · Mean ERICH score for females age 69-68 years = 25.16(4.30)  · Mean ERICH score for males over 80 years = 23.29(6.02)  · Mean ERICH score for females over 80 years = 17.20(8.32)         G codes:   In compliance with CMSs Claims Based Outcome Reporting, the following G-code set was chosen for this patient based on their primary functional limitation being treated: The outcome measure chosen to determine the severity of the functional limitation was the Tinetti with a score of 15/28 which was correlated with the impairment scale. ? Mobility - Walking and Moving Around:     - CURRENT STATUS: CK - 40%-59% impaired, limited or restricted    - GOAL STATUS: CJ - 20%-39% impaired, limited or restricted    - D/C STATUS:  ---------------To be determined---------------      Physical Therapy Evaluation Charge Determination   History Examination Presentation Decision-Making   MEDIUM  Complexity : 1-2 comorbidities / personal factors will impact the outcome/ POC  MEDIUM Complexity : 3 Standardized tests and measures addressing body structure, function, activity limitation and / or participation in recreation  LOW Complexity : Stable, uncomplicated  LOW Complexity : FOTO score of       Based on the above components, the patient evaluation is determined to be of the following complexity level: LOW     Pain:  Pain Scale 1: Numeric (0 - 10)  Pain Intensity 1: 0              Activity Tolerance:   Good  Please refer to the flowsheet for vital signs taken during this treatment. After treatment:   [x]         Patient left in no apparent distress sitting up in chair  []         Patient left in no apparent distress in bed  [x]         Call bell left within reach  [x]         Nursing notified  [x]         Caregiver present  []         Bed alarm activated    COMMUNICATION/EDUCATION:   The patients plan of care was discussed with: Registered Nurse. [x]         Fall prevention education was provided and the patient/caregiver indicated understanding. [x]         Patient/family have participated as able in goal setting and plan of care. [x]         Patient/family agree to work toward stated goals and plan of care. []         Patient understands intent and goals of therapy, but is neutral about his/her participation.   []         Patient is unable to participate in goal setting and plan of care.     Thank you for this referral.  Maddie Weston, PT   Time Calculation: 26 mins

## 2017-11-07 NOTE — PROGRESS NOTES
Problem: Self Care Deficits Care Plan (Adult)  Goal: *Acute Goals and Plan of Care (Insert Text)  Occupational Therapy Goals  Initiated 11/7/2017  1. Patient will perform grooming in standing with minimal assistance/contact guard assist within 7 day(s). 2.  Patient will perform bathing with moderate assistance  within 7 day(s). 3.  Patient will perform upper body dressing and lower body dressing with moderate assistance  within 7 day(s). 4.  Patient will perform toilet transfers with minimal assistance/contact guard assist within 7 day(s). 5.  Patient will perform all aspects of toileting with minimal assistance/contact guard assist within 7 day(s). 6.  Patient will participate in upper extremity therapeutic exercise/activities with supervision/set-up for 10 minutes within 7 day(s). 7.  Patient will perform standing adls for at least 8 minutes with CGA within 7 day(s). Occupational Therapy EVALUATION  Patient: Abi Rivas. (71 y.o. male)  Date: 11/7/2017  Primary Diagnosis: Heart failure (HCC)        Precautions:   Fall, Skin    ASSESSMENT :  Based on the objective data described below, the patient presents with complex medical history, BLE edema and weeping, fatigue, decreased balance, generalized weakness, low BP-but stable (nursing aware) with declining hemoglobin (8.1 today),  impairing independence and safety during adls and mobility. Pt is functioning below his reported baseline of independence in self care requiring minimal to maximal assistance. Pt will likely benefit from rehab prior to discharge home with his sister who provides assistance for IADLs, depending on his progress. Patient will benefit from skilled intervention to address the above impairments.   Patients rehabilitation potential is considered to be Guarded  Factors which may influence rehabilitation potential include:   []             None noted  []             Mental ability/status  [x]             Medical condition  []             Home/family situation and support systems  []             Safety awareness  []             Pain tolerance/management  []             Other:      PLAN :  Recommendations and Planned Interventions:  [x]               Self Care Training                  [x]        Therapeutic Activities  [x]               Functional Mobility Training    []        Cognitive Retraining  [x]               Therapeutic Exercises           [x]        Endurance Activities  [x]               Balance Training                   []        Neuromuscular Re-Education  []               Visual/Perceptual Training     [x]   Home Safety Training  [x]               Patient Education                 [x]        Family Training/Education  []               Other (comment):    Frequency/Duration: Patient will be followed by occupational therapy 4 times a week to address goals. Discharge Recommendations: Rehab and Skilled Nursing Facility--to be determined based on pt's progress  Further Equipment Recommendations for Discharge: tbd     SUBJECTIVE:   Patient stated I sit most of the time.     OBJECTIVE DATA SUMMARY:   HISTORY:   Past Medical History:   Diagnosis Date    CAD (coronary artery disease)     h/o CABG    Cancer ([facility]     kidney cancer diagnosed Feb 2017    [facility] systolic heart failure [facility]     Coronary atherosclerosis of native coronary artery     Diabetes ([facility]     Heart failure ([facility]     Hypertension     Other acute and subacute form of ischemic heart disease     Other ill-defined conditions(799.89)     elevated cholesterol    [facility]oxysmal ventricular tachycardia ([facility]     Presence of biventricular automatic cardioverter/defibrillator (AICD) 5/2/2016 5/2/16 IActive upgrade to biventricular AICD implant    S/P ablation of atrial fibrillation 1/8/2016     Past Surgical History:   Procedure Laterality Date    CARDIAC SURG PROCEDURE UNLIST  2005    CABG    COLONOSCOPY N/A 8/28/2017    COLONOSCOPY performed by Noé Chapa [facility] at [facility] AM[facility] OR    HX PACEMAKER         Prior Level of Function/Environment/Context: Pt reports that he is independent in self care-he lives with his sister who provides assist for IADLs. He reports that he is generally sedentary and mostly stays at home. Occupations in which the patient is/was successful, what are the barriers preventing that success: pt is sedentary at baseline. Performance Patterns (routines, roles, habits, and rituals): pt is able to perform basic self care independently , he is sitting most of the day per his report  Personal Interests and/or values: TV  Expanded or extensive additional review of patient history: complex medical history, Metastatic CA, DM, declining Hgb,     Home Situation  Home Environment: Private residence  # Steps to Enter: 3  Rails to Enter: Yes  One/Two Story Residence: One story  Living Alone: No  Support Systems: Family member(s)  Patient Expects to be Discharged to[de-identified] Private residence  Current DME Used/Available at Home: [address], [place], straight, Walker, rolling, Wheelchair  [x]  Right hand dominant   []  Left hand dominant    EXAMINATION OF PERFORMANCE DEFICITS:  Cognitive/Behavioral Status:  Neurologic State:  (reports feeling tired)  Orientation Level: Oriented X4  Cognition: Follows commands  Perception: Appears intact  Perseveration: No perseveration noted  Safety/Judgement: Decreased awareness of need for assistance;Decreased awareness of need for safety; Fall prevention; Insight into deficits    Skin: impaired BLEs--weeping edematous LEs    Edema: pt reports that he is edematous all over, but appears most in BLEs that are weeping    Hearing:   Auditory  Auditory Impairment: None    Vision/Perceptual:    Tracking: Able to track stimulus in all quadrants w/o difficulty                                Range of Motion:  BUEs:  AROM: Generally decreased, functional      decreased BLEs due to edema                   Strength:  BUEs: Strength: Generally decreased, functional       strength is equal, fair          Coordination:  Coordination: Generally decreased, functional  Fine Motor Skills-Upper: Left Intact; Right Intact    Gross Motor Skills-Upper: Left Intact; Right Intact (decreased B shoulder ROM at baseline)    Tone & Sensation:    Tone: Normal  Sensation: Intact                      Balance:  Sitting: Impaired  Sitting - Static: Supported sitting (good seated in chair)  Sitting - Dynamic: Not tested(leans forward to rock in prep to stand)    Standing: impaired   Standing - Static: Constant support; Fair  Standing - Dynamic : Fair (took steps forward and back to chair. Standing BP low, but stable in comparison to low BP in sitting )    Functional Mobility and Transfers for ADLs:  Bed Mobility:  Rolling:  (pt seated in chair upon arrival)    Transfers:  Sit to Stand: Moderate assistance  Stand to Sit: Moderate assistance  Bed to Chair: Moderate assistance  Toilet Transfer :  (mobility to bathroom deferred due to low BP in sitting and standing)    ADL Assessment:  Feeding:  (declines to eat reports no appetite-encouraged to eat a arturo)    Oral Facial Hygiene/Grooming: Minimum assistance (seated)    Bathing: Maximum assistance    Upper Body Dressing: Moderate assistance    Lower Body Dressing: Maximum assistance    Toileting: Total assistance (bernard catheter)  Meal Preparation:  (sister assists with IADLs at baseline)             ADL Intervention and task modifications:   Pt performed  gentle exercises then BP monitored for safe mobiltiy. BP low in sitting and standing, nursing informed. HBG is also low 8.1. Pt reporting feeling tired. Cognitive Retraining  Safety/Judgement: Decreased awareness of need for assistance;Decreased awareness of need for safety; Fall prevention; Insight into deficits    Therapeutic Exercise:   Pt performed BLE gentle exercises in sitting-ankle pumps, heel raises, knee flex/ext, BUE shoulder flex/ext    Functional Measure:  Barthel Index:    Bathin  Bladder: 0  Bowels: 5  Groomin  Dressin  Feedin  Mobility: 0  Stairs: 0  Toilet Use: 0  Transfer (Bed to Chair and Back): 5  Total: 20       Barthel and G-code impairment scale:  Percentage of impairment CH  0% CI  1-19% CJ  20-39% CK  40-59% CL  60-79% CM  80-99% CN  100%   Barthel Score 0-100 100 99-80 79-60 59-40 20-39 1-19   0   Barthel Score 0-20 20 17-19 13-16 9-12 5-8 1-4 0      The Barthel ADL Index: Guidelines  1. The index should be used as a record of what a patient does, not as a record of what a patient could do. 2. The main aim is to establish degree of independence from any help, physical or verbal, however minor and for whatever reason. 3. The need for supervision renders the patient not independent. 4. A patient's performance should be established using the best available evidence. Asking the patient, friends/relatives and nurses are the usual sources, but direct observation and common sense are also important. However direct testing is not needed. 5. Usually the patient's performance over the preceding 24-48 hours is important, but occasionally longer periods will be relevant. 6. Middle categories imply that the patient supplies over 50 per cent of the effort. 7. Use of aids to be independent is allowed. Jhonny Kumar., Barthel, D.W. (0556). Functional evaluation: the Barthel Index. [facility] (14)2. DAY Arriola, Pj Redding.aGbe., [place], [address] [place] [address] (). Measuring the change indisability after inpatient rehabilitation; comparison of the responsiveness of the Barthel Index and Functional [place] Measure. Journal of Neurology, Neurosurgery, and Psychiatry, 66(4), 309-064. ANSLEY Mancilla.LIZZETH, SERA Braroso, & Monae Carrero MGeraA. (2004.) Assessment of post-stroke quality of life in cost-effectiveness studies:  The usefulness of the Barthel Index and the EuroQoL-5D. Quality of Life Research, 13, 394-80       G codes: In compliance with CMSs Claims Based Outcome Reporting, the following G-code set was chosen for this patient based on their primary functional limitation being treated: The outcome measure chosen to determine the severity of the functional limitation was the Barthel Index with a score of 20/100 which was correlated with the impairment scale. ? Self Care:     - CURRENT STATUS: CL - 60%-79% impaired, limited or restricted    - GOAL STATUS: CK - 40%-59% impaired, limited or restricted    - D/C STATUS:  ---------------To be determined---------------     Occupational Therapy Evaluation Charge Determination   History Examination Decision-Making   LOW Complexity : Brief history review  MEDIUM Complexity : 3-5 performance deficits relating to physical, cognitive , or psychosocial skils that result in activity limitations and / or participation restrictions MEDIUM Complexity : Patient may present with comorbidities that affect occupational performnce. Miniml to moderate modification of tasks or assistance (eg, physical or verbal ) with assesment(s) is necessary to enable patient to complete evaluation       Based on the above components, the patient evaluation is determined to be of the following complexity level: LOW   Pain:  Pain Scale 1: Numeric (0 - 10)  Pain Intensity 1: 0    reports fatigue           Activity Tolerance:   BP low in sitting and standing, HGB also low per chart. Pt reports feeling fatigued. Nursing requested that pt stay up in the chair--she will assess   Please refer to the flowsheet for vital signs taken during this treatment.   After treatment:   [x] Patient left in no apparent distress sitting up in chair  [] Patient left in no apparent distress in bed  [] Call bell left within reach  [x] Nursing notified  [x] Caregiver present--guests arrived  [] Bed alarm activated    COMMUNICATION/EDUCATION:   The patients plan of care was discussed with: Physical Therapist and Registered Nurse.  [] Home safety education was provided and the patient/caregiver indicated understanding. [] Patient/family have participated as able in goal setting and plan of care. [x] Patient/family agree to work toward stated goals and plan of care. [] Patient understands intent and goals of therapy, but is neutral about his/her participation. [] Patient is unable to participate in goal setting and plan of care. This patients plan of care is appropriate for delegation to [facility].     Thank you for this referral.  Ann Carrero OTR/L  Time Calculation: 20 mins

## 2017-11-07 NOTE — PROGRESS NOTES
Problem: Falls - Risk of  Goal: *Absence of Falls  Document Dianna Fall Risk and appropriate interventions in the flowsheet.    Outcome: Progressing Towards Goal  Fall Risk Interventions:  Mobility Interventions: Assess mobility with egress test         Medication Interventions: Bed/chair exit alarm, Patient to call before getting OOB    Elimination Interventions: Bed/chair exit alarm, Call light in reach, Patient to call for help with toileting needs

## 2017-11-07 NOTE — PROGRESS NOTES
Hematology Oncology Progress Note         Follow up for: metastatic RCC     Chart notes reviewed since last visit. Case discussed with following: patient. Patient complains of the following: rough night - reports that folks tried 10 times to place bernard and it was agonizing. Breathing improved. Additional concerns noted by the staff:     Patient Vitals for the past 24 hrs:   BP Temp Pulse Resp SpO2 Height Weight   11/07/17 1216 (!) 94/38 97.4 °F (36.3 °C) 70 17 96 % - -   11/07/17 0813 103/45 98 °F (36.7 °C) 72 28 99 % - -   11/07/17 0630 101/41 - 70 25 99 % - -   11/07/17 0605 - - - - - 5' 9\" (1.753 m) 101.2 kg (223 lb 1.7 oz)   11/07/17 0600 (!) 98/33 - 70 26 99 % - -   11/07/17 0530 (!) 91/35 - 70 21 98 % - -   11/07/17 0500 (!) 93/39 - 71 25 99 % - -   11/07/17 0430 101/41 - 70 26 99 % - -   11/07/17 0400 94/44 - 70 22 98 % - -   11/07/17 0330 (!) 90/39 - 70 19 98 % - -   11/07/17 0300 106/41 98.2 °F (36.8 °C) 70 27 99 % - -   11/07/17 0230 98/43 - 70 27 99 % - -   11/07/17 0200 96/76 - 70 21 99 % - -   11/07/17 0130 (!) 83/59 - 70 24 99 % - -   11/07/17 0100 91/40 - 70 - 98 % - -   11/07/17 0030 97/44 - 70 - 99 % - -   11/07/17 0001 98/46 - 70 30 97 % - -   11/06/17 2330 107/43 - 70 - 99 % - -   11/06/17 2300 95/40 97.8 °F (36.6 °C) 73 (!) 32 99 % - -   11/06/17 2230 102/46 - 70 - 99 % - -   11/06/17 2200 108/43 - 70 (!) 33 99 % - -   11/06/17 2130 100/43 - 70 - 99 % - -   11/06/17 2100 119/43 - 71 (!) 32 99 % - -   11/06/17 2030 107/48 - 70 - 97 % - -   11/06/17 2000 101/43 - 70 (!) 32 97 % - -   11/06/17 1930 103/47 - 70 - 97 % - -   11/06/17 1900 118/51 97.8 °F (36.6 °C) 70 (!) 34 96 % - -   11/06/17 1800 100/41 97.5 °F (36.4 °C) 70 21 98 % - -   11/06/17 1617 (!) 82/35 97.5 °F (36.4 °C) 70 23 97 % - -   11/06/17 1508 (!) 76/31 97.5 °F (36.4 °C) 70 22 95 % 5' 9\" (1.753 m) 102.9 kg (226 lb 12.8 oz)     ROS negative for 11 organ systems except as noted.    Physical Examination:  Constitutional Alert, cooperative, oriented. Mood and affect appropriate. Appears close to chronological age. Well nourished. Well developed. Head Normocephalic; no scars   Eyes Conjunctivae and sclerae are clear and without icterus. Pupils are reactive and equal.   ENMT Sinuses are nontender. No oral exudates, ulcers, masses, thrush or mucositis. Oropharynx clear. Tongue normal.   Neck Supple without masses or thyromegaly. No jugular venous distension. Hematologic/Lymphatic No petechiae or purpura. No tender or palpable lymph nodes noted   Respiratory Lungs are clear to auscultation without rhonchi or wheezing. Cardiovascular Regular rate and rhythm of heart without murmurs, gallops or rubs. Chest / Line Site Chest is symmetric with no chest wall deformities. Abdomen Non-tender, non-distended, no masses, ascites or hepatosplenomegaly. Good bowel sounds. No guarding or rebound tenderness. Musculoskeletal No tenderness or swelling, normal range of motion without obvious weakness. Extremities No visible deformities, no cyanosis, clubbing or edema. Skin No rashes, scars, or lesions suggestive of malignancy. No petechiae, purpura, or ecchymoses. No excoriations. Neurologic No sensory or motor deficits noted but not specifically tested. Sitting up in wheelchair comfortably. Psychiatric Alert and oriented. Coherent speech. Verbalizes understanding of our discussions today.        Labs:  Recent Results (from the past 24 hour(s))   GLUCOSE, POC    Collection Time: 11/06/17  4:17 PM   Result Value Ref Range    Glucose (POC) 427 (H) 65 - 100 mg/dL    Performed by PeopLeaselor    GLUCOSE, POC    Collection Time: 11/06/17  4:19 PM   Result Value Ref Range    Glucose (POC) 420 (H) 65 - 100 mg/dL    Performed by PeopLeaselor    GLUCOSE, POC    Collection Time: 11/06/17  9:43 PM   Result Value Ref Range    Glucose (POC) 249 (H) 65 - 100 mg/dL    Performed by Eugene French, BASIC    Collection Time: 11/07/17  2:53 AM   Result Value Ref Range    Sodium 129 (L) 136 - 145 mmol/L    Potassium 5.0 3.5 - 5.1 mmol/L    Chloride 97 97 - 108 mmol/L    CO2 23 21 - 32 mmol/L    Anion gap 9 5 - 15 mmol/L    Glucose 178 (H) 65 - 100 mg/dL    BUN 56 (H) 6 - 20 MG/DL    Creatinine 2.22 (H) 0.70 - 1.30 MG/DL    BUN/Creatinine ratio 25 (H) 12 - 20      GFR est AA 35 (L) >60 ml/min/1.73m2    GFR est non-AA 29 (L) >60 ml/min/1.73m2    Calcium 8.7 8.5 - 10.1 MG/DL   CBC WITH AUTOMATED DIFF    Collection Time: 11/07/17  2:53 AM   Result Value Ref Range    WBC 29.6 (H) 4.1 - 11.1 K/uL    RBC 3.25 (L) 4.10 - 5.70 M/uL    HGB 8.1 (L) 12.1 - 17.0 g/dL    HCT 25.8 (L) 36.6 - 50.3 %    MCV 79.4 (L) 80.0 - 99.0 FL    MCH 24.9 (L) 26.0 - 34.0 PG    MCHC 31.4 30.0 - 36.5 g/dL    RDW 17.3 (H) 11.5 - 14.5 %    PLATELET 339 670 - 176 K/uL    NEUTROPHILS 91 (H) 32 - 75 %    LYMPHOCYTES 4 (L) 12 - 49 %    MONOCYTES 5 5 - 13 %    EOSINOPHILS 0 0 - 7 %    BASOPHILS 0 0 - 1 %    ABS. NEUTROPHILS 26.9 (H) 1.8 - 8.0 K/UL    ABS. LYMPHOCYTES 1.2 0.8 - 3.5 K/UL    ABS. MONOCYTES 1.5 (H) 0.0 - 1.0 K/UL    ABS. EOSINOPHILS 0.0 0.0 - 0.4 K/UL    ABS.  BASOPHILS 0.0 0.0 - 0.1 K/UL    RBC COMMENTS ANISOCYTOSIS  1+        RBC COMMENTS KIRAN CELLS  1+        RBC COMMENTS ACANTHOCYTES  SCHISTOCYTES  PRESENT        RBC COMMENTS MICROCYTOSIS  1+        WBC COMMENTS TOXIC GRANULATION      DF SMEAR SCANNED     MAGNESIUM    Collection Time: 11/07/17  2:53 AM   Result Value Ref Range    Magnesium 2.1 1.6 - 2.4 mg/dL   URINALYSIS W/ REFLEX CULTURE    Collection Time: 11/07/17  2:53 AM   Result Value Ref Range    Color DARK YELLOW      Appearance CLOUDY (A) CLEAR      Specific gravity 1.016 1.003 - 1.030      pH (UA) 5.5 5.0 - 8.0      Protein TRACE (A) NEG mg/dL    Glucose NEGATIVE  NEG mg/dL    Ketone NEGATIVE  NEG mg/dL    Blood LARGE (A) NEG      Urobilinogen 1.0 0.2 - 1.0 EU/dL    Nitrites NEGATIVE  NEG      Leukocyte Esterase MODERATE (A) NEG      WBC 5-10 0 - 4 /hpf    RBC 5-10 0 - 5 /hpf    Epithelial cells FEW FEW /lpf    Bacteria NEGATIVE  NEG /hpf    UA:UC IF INDICATED URINE CULTURE ORDERED (A) CNI      Hyaline cast 5-10 0 - 5 /lpf   BILIRUBIN, CONFIRM    Collection Time: 11/07/17  2:53 AM   Result Value Ref Range    Bilirubin UA, confirm NEGATIVE  NEG     GLUCOSE, POC    Collection Time: 11/07/17  7:32 AM   Result Value Ref Range    Glucose (POC) 194 (H) 65 - 100 mg/dL    Performed by Lucho Lee    GLUCOSE, POC    Collection Time: 11/07/17 11:18 AM   Result Value Ref Range    Glucose (POC) 175 (H) 65 - 100 mg/dL    Performed by Lucho Lee          Assessment and Plan:   Metastatic RCC - had evidence of progressive disease on last scan. Dr. Crespo Score was trying to get cabozantinib approved for him. anticiapte he will be stopping the opdivo    Acute on chronic systolic heart failure - on dobutamine    Has BIV-ICD    Type 2 DM    Anemia with prior GI bleed. Off heparin for now given drop in hgb.

## 2017-11-07 NOTE — CONSULTS
[address]    [facility]       Name:  Darling Medina   MR#:  [id number]   :  1940   Account #:  [de-identified]    Date of Consultation:  2017   Date of Adm:  2017       REQUESTING PHYSICIAN: Dr. Sumeet Iraheta: Beth Pierre MD    REASON FOR CONSULTATION: \"The patient cleared to restart Eliquis   for atrial fibrillation after her last GI bleed? \"    HISTORY OF PRESENT ILLNESS: This is a delightful 63-year-old   gentleman with a complex medical history including, but not limited to   congestive heart failure with biventricular ICD device, coronary artery   disease status post CABG, atrial fibrillation, and metastatic   hypernephroma who was admitted to the hospitalist service with   worsening shortness of breath and body edema. Reportedly, his   diuretics were discontinued for worsening renal function. I had seen   him last in August, where we did a colonoscopy on him, revealing   multiple colonic polyps and an AVM which was clipped. A repeat   colonoscopy was recommended in 6 months' time. There were   questions about Eliquis which he has been taking since the procedure. There was some discussion about the Eliquis with him and Dr. Jamar Menon, the details of which are unavailable to me. We were,   however, asked to comment whether he can go back on the Eliquis. Current hemoglobin stays at 8.1 with an MCV of 79.4. Iron indices   show a mixed pattern anemia. His white cell count is very elevated at   29,000. He is currently on dobutamine. He is also on oral amoxicillin. Denies any vomiting. Does complain of early satiety and some weight   loss. He has difficulty with breathing and shortness of breath, which is   somewhat better. Also seen by Oncology for history of   hypernephroma.  I have reviewed the CT scan that was performed   without contrast 6 days ago, revealing an enlarging pancreatic head   mass along with the superior mediastinal mass, small subcarinal lymph   nodes, aortocaval  lymph node, as well as a solid exophytic known left   renal mass. He also has increasing ascites and gallstones on the CT   scan. I could not review the official CT scan on the computer here. PAST MEDICAL HISTORY: Includes congestive heart failure,   metastatic cancer as noted. Chronic kidney disease, biventricular   automatic  and defibrillator, status post ablation for atrial fibrillation. History of dyspnea on exertion, status post CABG, essential   hypertension, diabetes, hyperlipidemia, and coronary artery disease. There is a history of polyps. ALLERGIES: NONE NOTED. FAMILY HISTORY: Contributory for heart disease, hypertension and   diabetes in mother. Father had similar issues. HOME MEDICATIONS: Reviewed. current MEDICATIONS:  Also reviewed. REVIEW OF SYSTEMS   Detailed 10-point review of systems is positive for shortness of breath,   dyspnea, orthopnea, abdominal swelling, lower extremity edema with    as well as about a 6 pound weight loss. See HPI for details. PHYSICAL EXAMINATION   CURRENT VITAL SIGNS: Temperature 98, pulse 72, blood pressure   is 102/45, saturations 99%, respiratory rate is 28 on 2 L nasal cannula. GENERAL: He is alert, awake and oriented, in mild respiratory   distress, 3 family members are in the room. He is pleasant and gives   an okay history. HEENT: EOMI. NECK: Supple. LUNGS: Decreased BS at bases. HEART: S1, S2, .   ABDOMEN: Large. There is generalized anasarca. The abdominal skin   is thickened. There is a midline scar noted. EXTREMITIES: Reveal significant edema all the way up to his thighs. NEUROLOGIC: Cranial nerves were not examined. Strength was not   Examined. SKin: no jaundice    LABORATORY DATA: White cells 29.6, hemoglobin and hematocrit   8.1 and 25.8, MCV 79, platelets 080, neutrophils 91.  Urinalysis cloudy,   large blood, 5-10 white cells, negative bacteria. INR 1.6. Pro time 16.7. Chem-7: Sodium 129, potassium 5, glucose 178, BUN 56, creatinine   2.2. LFTs with an AST and ALT that is normal. Alkaline phosphatase of   22, total bilirubin is 2.7, this is not fractionated. ProBNP is over 30,000. Troponin I is negative. A low iron, low saturation, low TIBC and a   normal ferritin. CT scan of the abdomen and pelvis as noted without   any contrast. Impression suggests enlarging pancreatic mass with   enlarging liver METS, increasing ascites, gallstones, solid renal mass   and stable retroperitoneal and mediastinal adenopathy. Chest x-ray   showed mild interstitial pulmonary edema, enlarged heart, ICD and   pleural effusion, small on the right side. No recent ultrasound is   available. No recent echocardiogram is available, however, echo from   2016 shows an ejection fraction of 15% to 20% with markedly reduced   systolic function, moderately dilated left atrium, moderate mitral valve   regurgitation and a dilated right ventricle. ASSESSMENT: A 28-year-old gentleman admitted with shortness of   breath, congestive heart failure, hypotension with history of metastatic   hypernephroma with increasing masses in the pancreas,   metastases to the liver, who is on Eliquis and has anemia, albeit mixed   Pattern. No active bleeding. We were asked to comment on Eliquis usage. PLANS AND RECOMMENDATIONS: The patient has been taking   Eliquis as per  history. I do not see a strong contra-indication to stop or with hold  the Eliquis; However, he did have a colonic AVM and the possibility of a   bleed exist. In his situation, I suggest continuing him on Eliquis. In case   of rebleeding, a colonoscopic evaluation can be entertained. However,   taking into account his multiple comorbidities, I feel medical   management should suffice.  Regarding his large pancreatic head   mass, I suggest a CA 19-9 and probably would offer an MRI to see if   there is common bile duct obstruction; however, he is not  a   good candidate for an ERCP or  EUS, taking into account his   multiple comorbidities and specifically his weak heart. I left a message   for Dr. Cherie Michaels with my recommendations. Please call me back with any questions. CASEY EUGENE [facility] CampusMD [place] / FREDY   D:  11/07/2017   11:22   T:  11/07/2017   12:17   Job #:  [id number]

## 2017-11-07 NOTE — PROGRESS NOTES
Unable to do echo at this time, patient sitting in a wheel chair with a full lunch tray in front of him and he was asleep. Will attempt at a later time.

## 2017-11-07 NOTE — CDMP QUERY
Account Number: [de-identified]  MRN: [id number]  Patient: Marisol Hagen   Created: [id number]  Clinician Name: Elsy Valentin RN, CCDS   Dr. Moore Purchase :  Please clarify if this patient is being treated/managed for:    => Shock, Cardiogenic in the setting of Hypertensive chronic kidney Dz w/ heart failure requiring IVF, albumin & on dobutamine gtt,  ? HD  =>Other Explanation of clinical findings  =>Unable to Determine (no explanation of clinical findings)    The medical record reflects the following clinical findings, treatment, and risk factors:    Risk Factors: multifactorial Holding of diuretics, low albumin, and possibly worsened heart function from chemo with his systolic HF. Clinical Indicators: hypotension, oliguric, MAP 55-53  Treatment: Dobutamine gtt, cardiology c/s, nephrology c/s,  strict I&O monitoring, bernard insertion. Please clarify and document your clinical opinion in the progress notes and discharge summary including the definitive and/or presumptive diagnosis, (suspected or probable), related to the above clinical findings. Please include clinical findings supporting your diagnosis.     Thank Nir Jones RN, CCDS

## 2017-11-07 NOTE — PROGRESS NOTES
Hospitalist Progress Note    NAME: Jessica Jean. :  1940   MRN:  [id number]       Assessment / Plan:   Hyponatremia, likely due to vol overload, monitor   CKD 4  Oligouric  -cr remains stable but UO remains poor despite IVF challenged and bernard placed overnight for retention, IVF   -discussed case with Dr Edel Mendez. ? HD     Acute on chronic systolic heart failure s/p BIV-ICD  Ischemic cardiomyopathy  Persistent Atrial fibrillation  Hypotension   -diuretic discontinued 2 weeks ago due to worsening of renal failure  -cxr showed pulmonary edema with small R pleural effusion, trop neg, probnp 30K  -last Echo showed EF15-20%  -received albumin in the ER, remains hypotensive so dobutamin gtt started  -BP low despite albumin given in the ER. Will order dobutamine gtt for aggressive diuresing   -IV lasix 40mg BID, amiodarone, statin   -off Eliquis due to hx of GI bleed?, denies any evidence of bleeding currently. GI consulted   -poor urine outpt overnight.     -cardiology following     Metastatic hypernephroma to bone, possibly nodes in the chest  Leukocytosis   -on Opdivo at infusion center, curently on hold  -follows by Dr Gene Castle  -consult oncology     T2DM  -lantus, SSI, titrate prn    Anemia  Hx of GI bleed  -hgb declined overnight. No evidence of bleed  -will stop heparin for now  -monitor     Code Status: full  Surrogate Decision Maker: pt's brother  DVT Prophylaxis: declining hgb, will hold heparin  GI Prophylaxis: not indicated     Subjective:     Chief Complaint / Reason for Physician Visit  Pt seen at bedside, no acute complaints. Poor urine outpt overnight. Difficult bernard placed overnight by urology. Discussed with RN events overnight. UO remains poor despite IVF challenged.         Review of Systems:  Symptom Y/N Comments  Symptom Y/N Comments   Fever/Chills n   Chest Pain n    Poor Appetite    Edema y    Cough    Abdominal Pain     Sputum    Joint Pain     SOB/WILSON y   Pruritis/Rash Nausea/vomit    Tolerating PT/OT     Diarrhea    Tolerating Diet y    Constipation    Other       Could NOT obtain due to:      Objective:     VITALS:   Last 24hrs VS reviewed since prior progress note.  Most recent are:  Patient Vitals for the past 24 hrs:   Temp Pulse Resp BP SpO2   11/07/17 0813 98 °F (36.7 °C) 72 28 103/45 99 %   11/07/17 0630 - 70 25 101/41 99 %   11/07/17 0600 - 70 26 (!) 98/33 99 %   11/07/17 0530 - 70 21 (!) 91/35 98 %   11/07/17 0500 - 71 25 (!) 93/39 99 %   11/07/17 0430 - 70 26 101/41 99 %   11/07/17 0400 - 70 22 94/44 98 %   11/07/17 0330 - 70 19 (!) 90/39 98 %   11/07/17 0300 98.2 °F (36.8 °C) 70 27 106/41 99 %   11/07/17 0230 - 70 27 98/43 99 %   11/07/17 0200 - 70 21 96/76 99 %   11/07/17 0130 - 70 24 (!) 83/59 99 %   11/07/17 0100 - 70 - 91/40 98 %   11/07/17 0030 - 70 - 97/44 99 %   11/07/17 0001 - 70 30 98/46 97 %   11/06/17 2330 - 70 - 107/43 99 %   11/06/17 2300 97.8 °F (36.6 °C) 73 (!) 32 95/40 99 %   11/06/17 2230 - 70 - 102/46 99 %   11/06/17 2200 - 70 (!) 33 108/43 99 %   11/06/17 2130 - 70 - 100/43 99 %   11/06/17 2100 - 71 (!) 32 119/43 99 %   11/06/17 2030 - 70 - 107/48 97 %   11/06/17 2000 - 70 (!) 32 101/43 97 %   11/06/17 1930 - 70 - 103/47 97 %   11/06/17 1900 97.8 °F (36.6 °C) 70 (!) 34 118/51 96 %   11/06/17 1800 97.5 °F (36.4 °C) 70 21 100/41 98 %   11/06/17 1617 97.5 °F (36.4 °C) 70 23 (!) 82/35 97 %   11/06/17 1508 97.5 °F (36.4 °C) 70 22 (!) 76/31 95 %   11/06/17 1331 97.1 °F (36.2 °C) 70 23 90/51 98 %   11/06/17 1300 - 70 23 (!) 87/37 96 %   11/06/17 1245 - 70 24 (!) 89/40 96 %   11/06/17 1230 - 70 20 90/43 94 %   11/06/17 1215 - 71 24 (!) 86/42 95 %   11/06/17 1204 - 70 - (!) 85/35 -   11/06/17 1145 - 70 18 90/42 93 %   11/06/17 1130 - 71 18 91/42 93 %   11/06/17 1115 - 70 20 93/44 91 %   11/06/17 1100 - 70 19 95/58 95 %   11/06/17 1030 - 71 26 98/49 97 %   11/06/17 1016 - - - 94/43 99 %   11/06/17 1015 97.2 °F (36.2 °C) 70 22 - 99 %       Intake/Output Summary (Last 24 hours) at 11/07/17 0945  Last data filed at 11/07/17 0703   Gross per 24 hour   Intake           649.49 ml   Output              690 ml   Net           -40.51 ml        PHYSICAL EXAM:  General: WD, WN. Alert, cooperative, no acute distress    EENT:  EOMI. Anicteric sclerae. MMM  Resp:  CTA bilaterally, no wheezing or rales. No accessory muscle use  CV:  Regular  rhythm,  +2b/l LE edema  GI:  Soft, Non distended, Non tender.  +BS  Neurologic:  Alert and oriented X 3, normal speech  Psych:   Good insight. Not anxious nor agitated  Skin:  No rashes. No jaundice    Reviewed most current lab test results and cultures  YES  Reviewed most current radiology test results   YES  Review and summation of old records today    NO  Reviewed patient's current orders and MAR    YES  PMH/SH reviewed - no change compared to H&P  ________________________________________________________________________  Care Plan discussed with:    Comments   Patient x    Family      RN x    Care Manager     Consultant  x Jeremiah                     Multidiciplinary team rounds were held today with , nursing, pharmacist and clinical coordinator. Patient's plan of care was discussed; medications were reviewed and discharge planning was addressed. ________________________________________________________________________  Total NON critical care TIME:  35   Minutes    Total CRITICAL CARE TIME Spent:   Minutes non procedure based      Comments   >50% of visit spent in counseling and coordination of care     ________________________________________________________________________  Mele Arredondo MD     Procedures: see electronic medical records for all procedures/Xrays and details which were not copied into this note but were reviewed prior to creation of Plan. LABS:  I reviewed today's most current labs and imaging studies.   Pertinent labs include:  Recent Labs      11/07/17   0253  11/06/17   1043   WBC  29.6*  27.3* HGB  8.1*  9.2*   HCT  25.8*  30.5*   PLT  321  322     Recent Labs      11/07/17   0253  11/06/17   1043   NA  129*  129*   K  5.0  4.3   CL  97  93*   CO2  23  28   GLU  178*  358*   BUN  56*  52*   CREA  2.22*  2.31*   CA  8.7  8.9   MG  2.1  2.3   ALB   --   2.2*   TBILI   --   2.7*   SGOT   --   20   ALT   --   12   INR   --   1.6*       Signed: Jay Jay Slater MD

## 2017-11-07 NOTE — CONSULTS
Consultation Note    NAME: Anna Gamez. :  1940   MRN:  [id number]     Date/Time:  2017 1:36 PM    I have been asked to see this patient by Dr. Genie Sagastume  for advice/opinion re: BARBARA/anasarca. Assessment :    Plan: BARBARA  Anasarca  Hyponatremia  DM 2  Hypoalbuminemia  Anemia  Hypotension  Metastatic RCCa - worsened  Acute on chronic systolic HF (EF 46-34%) Creatinine now 2.2 (a fair amount of fluctuation - as high as 3.2 on 10/27); ~ 500 mg proteinuria; urine with large blood (5-10 rbc's); now with bernard    hgb 8.1    Albumin 2.2    Weight is trending down (231 to 226 to 223 #'s); UOP doesn't reflect weight loss; On Dobutamine; s/p lasix 120 mg iv total yesterday    BP is marginal (SBP 90 to 103)    I do not think Mr. Bernice Gregory is a HD candidate b/c of his very poor EF and metastatic RCC; will cautiously give iv lasix gtt at low dose - 10 mg/hr (watch BP carefully); stop flomax while bernard is in place (continue bernard for accurate I&O and LOPEZ symptoms on admission). Will follow closely; if fails to diurese with Lasix gtt or if poorly tolerates b/c low BP then prognosis is very poor and again, I see no role for HD/UF in Mr. Franks Bigger:   CHIEF COMPLAINT:  Anasarca/barbara    HISTORY OF PRESENT ILLNESS:     Armani Shaikh is a 78 y.o.   male who has a history of systolic HF and metastatic renal cancer. He has had issues with swelling for a couple of months. Worse in the last few weeks - his diuretics were stopped b/c of a rise in creatinine a couple of weeks ago. He saw Dr. Leyda Henderson for an initial visit in the office on 10/31. His creatinine at the time was 1.79. He currently is oob in a wheelchair and continues with severe edema (up to mid-trunk, SOB/WILSON/orthopnea). He now has a bernard. Poor appetite. Denies nsaids or high salt intake. No n/v/d. Denies lightheaded feeling. No rash.     Past Medical History:   Diagnosis Date    CAD (coronary artery disease)     h/o CABG    Cancer ([facility] [address] kidney cancer diagnosed Feb 2017    Chronic systolic heart failure (HCC)     Coronary atherosclerosis of native coronary artery     Diabetes ([facility] [address]     Heart failure ([facility] [address]     Hypertension     Other acute and subacute form of ischemic heart disease     Other ill-defined conditions(799.89)     elevated cholesterol    Paroxysmal ventricular tachycardia (HCC)     Presence of biventricular automatic cardioverter/defibrillator (AICD) 5/2/2016 5/2/16 [employer] upgrade to biventricular AICD implant    S/P ablation of atrial fibrillation 1/8/2016      Past Surgical History:   Procedure Laterality Date    CARDIAC SURG PROCEDURE UNLIST  2005    CABG    COLONOSCOPY N/A 8/28/2017    COL[facility]OSCOPY performed by [facility] at [facility] AM[facility]    HX PACEMAKER       Social History   Substance Use Topics    Smoking status: Former Smoker     Quit date: 1/8/2005    Smokeless tobacco: Former User     Quit date: 4/9/2011    Alcohol use No      Family History   Problem Relation Age of Onset    Heart Disease Mother     Hypertension Mother     Diabetes Mother     Diabetes Father     Heart Disease Father     Hypertension Father       No Known Allergies   Prior to Admission medications    Medication Sig Start Date End Date Taking? Authorizing Provider   carvedilol (COREG) 25 mg tablet Take 25 mg by mouth two (2) times daily (with meals). Yes Historical Provider   insulin NPH/insulin regular (NOVOLIN 70/30, HUMULIN 70/30) 100 unit/mL (70-30) injection 30 Units by SubCUTAneous route every evening. Insulin Administration Instructions:    40 units SUBQ daily (morning before breakfast)  30 units SUBQ evening (before dinner)   Yes Historical Provider   spironolactone (ALDACTONE) 25 mg tablet Take 25 mg by mouth daily. Yes Historical Provider   amoxicillin (AMOXIL) 500 mg capsule Take 500 mg by mouth two (2) times a day.    Yes Historical Provider   tamsulosin (FLOMAX) 0.4 mg capsule Take 0.4 mg by mouth daily. Yes Historical Provider   epoetin marianela (EPOGEN;PROCRIT) 10,000 unit/mL injection 10,000 Units by SubCUTAneous route every seven (7) days. Patient receives injection every Friday. Yes Historical Provider   nivolumab (OPDIVO) 40 mg/4 mL soln chemo injection 240 mg by IntraVENous route once. Infuse 134 mL/hour over 60 minutes. Yes Historical Provider   amiodarone (CORDARONE) 200 mg tablet TAKE ONE TABLET BY MOUTH ONCE DAILY 9/11/17  Yes Alcira Aguila MD   insulin NPH/insulin regular (NOVOLIN 70/30, HUMULIN 70/30) 100 unit/mL (70-30) injection 40 Units by SubCUTAneous route Daily (before breakfast). Insulin Administration Instructions:    40 units SUBQ daily (morning before breakfast)  30 units SUBQ evening (before dinner)   Yes Historical Provider   apixaban (ELIQUIS) 5 mg tablet Take one tablet by mouth twice daily. 10/25/17   Alcira Aguila MD   lisinopril (PRINIVIL, ZESTRIL) 5 mg tablet Take 5 mg by mouth daily.     Historical Provider   bumetanide (BUMEX) 1 mg tablet Take 1 tab daily, increase to one tab 2x a day if weight up 2lbs or more in 24hrs, 5lbs or more in a week 5/25/17   Ranjeet Kern NP     REVIEW OF SYSTEMS:     []  Unable to obtain reliable ROS due to  [] mental status  [] sedated   [] intubated   [x] Total of 12 systems reviewed as follows:  Constitutional: negative fever, negative chills, negative weight loss  Eyes:   negative visual changes  ENT:   negative sore throat, tongue or lip swelling  Respiratory:  negative cough, + dyspnea  Cards:  negative for chest pain, palpitations,+++ lower extremity edema  GI:   negative for nausea, vomiting, diarrhea, and abdominal pain  :  negative for frequency, dysuria  Integument:  negative for rash and pruritus  Heme:  negative for easy bruising and gum/nose bleeding  Musculoskel: negative for myalgias,  back pain and muscle weakness  Neuro:  negative for headaches, dizziness, vertigo  Psych:  negative for feelings of anxiety, depression Travel?: none    Objective:   VITALS:    Visit Vitals    BP (!) 94/38 (BP 1 Location: Left arm, BP Patient Position: At rest)    Pulse 70    Temp 97.4 °F (36.3 °C)    Resp 17    Ht 5' 9\" (1.753 m)    Wt 101.2 kg (223 lb 1.7 oz)    SpO2 96%    BMI 32.95 kg/m2     PHYSICAL EXAM:  Gen:  []  WD []  WN  [] cachectic []  thin []  obese []  disheveled             [x]  ill apearing  []   Critical  []   Chronic    [x]  No acute distress    HEENT:   [x] NC/AT/PERRLA/EOMI    [] pink conjunctivae      [] pale conjunctivae                  PERRL  [] yes  [] no      [] moist mucosa    [] dry mucosa    hearing intact to voice [x] yes  [] No                 NECK:   supple [x] yes  [] no        masses [] yes  [] No               thyroid  []  non tender  []  tender    RESP:   [x] CTA bilaterally/no wheezing/rhonchi/rales/crackles    [] rhonchi bilaterally - no dullness  [] wheezing   [] rhonchi   [] crackles     use of accessory muscles [] yes [x] no    CARD:   [x]  regular rate and rhythm/No murmurs/rubs/gallops    murmur  [] yes ()  [] no      Rubs  [] yes  [] no       Gallops [] yes  [] no    Rate []  regular  []  irregular        carotid bruits  [] Right  []  Left                 LE edema - anasarca up to mid trunk [x] yes  [] no           JVP  [x]  yes   []  no    ABD:    [x] soft/ distended/non tender/+bowel sounds/no HSM    []  Rigid    tenderness [] yes [] no   Liver enlargement  []  yes []  no                Spleen enlargement  []  yes []  no     distended []  yes [] no     bowel sound  [] hypoactive   [] hyperactive    LYMPH:    Neck []  yes [x]  no       Axillae []  yes []  no    SKIN:   Rashes []  yes   [x]  no    Ulcers []  yes   []  no               [] tight to palpitation    skin turgor [x]  good  [] poor  [] decreased               Cyanosis/clubbing []  yes []  no    NEUR:   [x] cranial nerves II-XII grossly intact       [] Cranial nerves deficit                 []  facial droop    []  slurred speech   [] aphasic     [] Strength normal     []  weakness  []  LUE  []   RUE/ []  LLE  []   RLE    follows commands  [x]  yes []  no           PSYCH:   insight [] poor [] good   Alert and Oriented to  [x] person  [x] place  [x]  time                    [] depressed [] anxious [] agitated  [] lethargic [] stuporous  [] sedated     LAB DATA REVIEWED:    Recent Labs      11/07/17   0253  11/06/17   1043   WBC  29.6*  27.3*   HGB  8.1*  9.2*   HCT  25.8*  30.5*   PLT  321  322     Recent Labs      11/07/17   0253  11/06/17   1043   NA  129*  129*   K  5.0  4.3   CL  97  93*   CO2  23  28   BUN  56*  52*   CREA  2.22*  2.31*   GLU  178*  358*   CA  8.7  8.9   MG  2.1  2.3     Recent Labs      11/06/17   1043   SGOT  20   ALT  12   AP  292*   TBILI  2.7*   ALB  2.2*   GLOB  4.4*     Recent Labs      11/06/17   1043   INR  1.6*   PTP  16.7*      No results for input(s): FE, TIBC, PSAT, FERR in the last 72 hours. No results for input(s): PH, PCO2, PO2 in the last 72 hours. No results for input(s): CPK, CKMB in the last 72 hours. No lab exists for component: TROPONINI  Lab Results   Component Value Date/Time    Glucose (POC) 175 11/07/2017 11:18 AM    Glucose (POC) 194 11/07/2017 07:32 AM    Glucose (POC) 249 11/06/2017 09:43 PM    Glucose (POC) 420 11/06/2017 04:19 PM    Glucose (POC) 427 11/06/2017 04:17 PM       Procedures: see electronic medical records for all procedures/Xrays and details which were not copied into this note but were reviewed prior to creation of Plan.    ________________________________________________________________________       ___________________________________________________  Consulting Physician:  Carl Douglass MD

## 2017-11-07 NOTE — PROGRESS NOTES
PCU SHIFT NURSING NOTE      Bedside and Verbal shift change report given to Tammy Persaud (oncoming nurse) by Gordon Hendricks RN (offgoing nurse). Report included the following information SBAR, Intake/Output, MAR, Recent Results, Med Rec Status and Cardiac Rhythm NSR. Shift Summary:   0800: Pt resting in bed, denies pain or any needs at this time. 0930: Bladder scan complete per verbal order, 0ml shown in bladder. 1200: Pt sitting up in chair. 1800: Pt assisted back into bed, pt had small BM, gown and linens changed. Admission Date 11/6/2017   Admission Diagnosis Heart failure ([facility] [address]   Consults IP CONSULT TO CARDIOLOGY  IP CONSULT TO HEMATOLOGY  IP CONSULT TO PALLIATIVE CARE - PROVIDER  IP CONSULT TO GASTROENTEROLOGY  IP CONSULT TO UROLOGY        Consults   []PT   []OT   []Speech   []Case Management      [] Palliative      Cardiac Monitoring Order   []Yes   []No     IV drips   []Yes    Drip:                            Dose:  Drip:                            Dose:  Drip:                            Dose:   []No     GI Prophylaxis   []Yes   []No         DVT Prophylaxis   SCDs:  Sequential Compression Device: Bilateral          Maycol stockings:         [] Medication   []Contraindicated   []None      Activity Level Activity Level: Bed Rest     Activity Assistance: Partial (two people)   Purposeful Rounding every 1-2 hour? []Yes   Abernathy Score  Total Score: 3   Bed Alarm (If score 3 or >)   []Yes   [] Refused (See signed refusal form in chart)   Panfilo Score  Panfilo Score: 17   Panfilo Score (if score 14 or less)   []PMT consult   []Wound Care consult      []Specialty bed   [] Nutrition consult          Needs prior to discharge:   Home O2 required:    []Yes   []No    If yes, how much O2 required?     Other:    Last Bowel Movement: Last Bowel Movement Date: 11/04/17      Influenza Vaccine          Pneumonia Vaccine           Diet Active Orders   Diet    DIET DIABETIC CONSISTENT CARB Regular; 2 GM NA (House Low NA); FR 1500ML      LDAs               Peripheral IV 11/06/17 Right Antecubital (Active)   Site Assessment Clean, dry, & intact 11/7/2017  7:05 AM   Phlebitis Assessment 0 11/7/2017  7:05 AM   Infiltration Assessment 0 11/7/2017  7:05 AM   Dressing Status Clean, dry, & intact 11/7/2017  7:05 AM   Dressing Type Transparent 11/7/2017  7:05 AM   Hub Color/Line Status Pink 11/7/2017  7:05 AM   Action Taken Open ports on tubing capped 11/7/2017  3:00 AM   Alcohol Cap Used Yes 11/7/2017  3:00 AM       Peripheral IV 11/07/17 Right (Active)   Site Assessment Clean, dry, & intact 11/7/2017  7:05 AM   Phlebitis Assessment 0 11/7/2017  7:05 AM   Infiltration Assessment 0 11/7/2017  7:05 AM   Dressing Status Clean, dry, & intact 11/7/2017  7:05 AM   Dressing Type Transparent 11/7/2017  7:05 AM   Hub Color/Line Status Pink 11/7/2017  7:05 AM   Action Taken Dressing changed 11/7/2017  3:00 AM   Alcohol Cap Used Yes 11/7/2017  3:00 AM                      Urinary Catheter Urinary Catheter 11/06/17 Coude-Criteria for Appropriate Use: Medically/surgically unstable    Intake & Output   Date 11/06/17 0700 - 11/07/17 0659 11/07/17 0700 - 11/08/17 0659   Shift 6398-4316 6022-4751 24 Hour Total 3354-0022 1156-9243 24 Hour Total   I  N  T  A  K  E   P. O. 50  50 240  240      P. O. 50  50 240  240    I.V.  (mL/kg/hr) 14.7  (0) 344.8  (0.3) 359.5  (0.1)         DOBUTamine Volume 14.7 94.8 109.5         Volume (sodium chloride 0.9 % bolus infusion 500 mL)  250 250       Shift Total  (mL/kg) 64.7  (0.6) 344.8  (3.4) 409.5  (4) 240  (2.4)  240  (2.4)   O  U  T  P  U  T   Urine  (mL/kg/hr) 450  (0.4) 240  (0.2) 690  (0.3)         Urine Voided 450  450         Urine Output (mL) (Urinary Catheter 11/06/17 Coude)  240 240       Shift Total  (mL/kg) 450  (4.4) 240  (2.4) 690  (6.8)      NET -385. 3 104.8 -280.5 240  240   Weight (kg) 102.9 101.2 101.2 101.2 101.2 101.2         Readmission Risk Assessment Tool Score High Risk            26       Total Score 3 Has Seen PCP in Last 6 Months (Yes=3, No=0)    5 Pt. Coverage (Medicare=5 , Medicaid, or Self-Pay=4)    18 Charlson Comorbidity Score (Age + Comorbid Conditions)        Criteria that do not apply:    . Living with Significant Other. Assisted Living. LTAC. SNF.  or   Rehab    Patient Length of Stay (>5 days = 3)    IP Visits Last 12 Months (1-3=4, 4=9, >4=11)       Expected Length of Stay - - -   Actual Length of Stay 1

## 2017-11-07 NOTE — CONSULTS
Urology Consult    Patient: Abi Rivas. MRN: [id number]  SSN: [id number]    YOB: 1940  Age: 78 y.o. Sex: male          Date of Consultation:  November 6, 2017  Requesting Physician: Beata West MD  Reason for Consultation:  Unable to catheterize  Pre-existing [facility] Patient:   Physician:         History of Present Illness:  Patient is a 78 y.o. male admitted 11/6/2017 to the hospital for Heart failure [facility]). He notes difficulty voiding while diuresing, laying in bed. Attempts to cath unsuccessful. Past Medical History:  No Known Allergies   Prior to Admission medications    Medication Sig Start Date End Date Taking? Authorizing Provider   carvedilol (COREG) 25 mg tablet Take 25 mg by mouth two (2) times daily (with meals). Yes Historical Provider   insulin NPH/insulin regular (NOVOLIN 70/30, HUMULIN 70/30) 100 unit/mL (70-30) injection 30 Units by SubCUTAneous route every evening. Insulin Administration Instructions:    40 units SUBQ daily (morning before breakfast)  30 units SUBQ evening (before dinner)   Yes Historical Provider   spironolactone (ALDACTONE) 25 mg tablet Take 25 mg by mouth daily. Yes Historical Provider   amoxicillin (AMOXIL) 500 mg capsule Take 500 mg by mouth two (2) times a day. Yes Historical Provider   tamsulosin (FLOMAX) 0.4 mg capsule Take 0.4 mg by mouth daily. Yes Historical Provider   epoetin marianela (EPOGEN;PROCRIT) 10,000 unit/mL injection 10,000 Units by SubCUTAneous route every seven (7) days. Patient receives injection every Friday. Yes Historical Provider   nivolumab (OPDIVO) 40 mg/4 mL soln chemo injection 240 mg by IntraVENous route once. Infuse 134 mL/hour over 60 minutes.    Yes Historical Provider   amiodarone (CORDARONE) 200 mg tablet TAKE ONE TABLET BY MOUTH ONCE DAILY 9/11/17  Yes Eneida Valdez MD   insulin NPH/insulin regular (NOVOLIN 70/30, HUMULIN 70/30) 100 unit/mL (70-30) injection 40 Units by SubCUTAneous route Daily (before breakfast). Insulin Administration Instructions:    40 units SUBQ daily (morning before breakfast)  30 units SUBQ evening (before dinner)   Yes Historical Provider   apixaban (ELIQUIS) 5 mg tablet Take one tablet by mouth twice daily. 10/25/17   Alcira Aguila MD   lisinopril (PRINIVIL, ZESTRIL) 5 mg tablet Take 5 mg by mouth daily. Historical Provider   bumetanide (BUMEX) 1 mg tablet Take 1 tab daily, increase to one tab 2x a day if weight up 2lbs or more in 24hrs, 5lbs or more in a week 17   Ranjeet Kern NP      PMHx:  has a past medical history of CAD (coronary artery disease); Cancer ([facility] [address] Chronic systolic heart failure ([facility] [address] Coronary atherosclerosis of native coronary artery; Diabetes ([facility] [address] Heart failure ([facility] [address] Hypertension; Other acute and subacute form of ischemic heart disease; Other ill-defined conditions(799.89); Paroxysmal ventricular tachycardia ([facility] [address] Presence of biventricular automatic cardioverter/defibrillator (AICD) (2016); and S/P ablation of atrial fibrillation (2016). PSurgHx:  has a past surgical history that includes cardiac surg procedure unlist (); pacemaker; and colonoscopy (N/A, 2017). PSocHx:  reports that he quit smoking about 12 years ago. He quit smokeless tobacco use about 6 years ago. He reports that he does not drink alcohol or use illicit drugs. ROS:  Admission ROS by Maria Fernanda Velez MD from 2017 were reviewed with the patient and changes (other than per HPI) include: none. Physical Exam:            Vitals[de-identified]    Temp (24hrs), Av.4 °F (36.3 °C), Min:97.1 °F (36.2 °C), Max:97.8 °F (36.6 °C)   Blood pressure 107/48, pulse 70, temperature 97.8 °F (36.6 °C), resp. rate 24, height 5' 9\" (1.753 m), weight 102.9 kg (226 lb 12.8 oz), SpO2 97 %.              I&O's:                  General:    appears nontoxic                     Skin:  no clubbing, cyanosis, edema                HEENT:  NCAT, O/P Clear        Throat/Neck: supple, no LAD                 Chest[de-identified]  CTA      Heart[de-identified]  Regular rate and rhythm             Abdomen/Flank[de-identified]  no CVAT, non-tender abdomen without masses             Bladder[de-identified]  Bladder just palpable   Lymph nodes:  no Cervical, supraclavicular, and axillary LAD     Lab Results   Component Value Date/Time    WBC 27.3 11/06/2017 10:43 AM    HCT 30.5 11/06/2017 10:43 AM    PLATELET 880 58/71/5955 10:43 AM    Sodium 129 11/06/2017 10:43 AM    Potassium 4.3 11/06/2017 10:43 AM    Chloride 93 11/06/2017 10:43 AM    CO2 28 11/06/2017 10:43 AM    BUN 52 11/06/2017 10:43 AM    Creatinine 2.31 11/06/2017 10:43 AM    Glucose 358 11/06/2017 10:43 AM    Calcium 8.9 11/06/2017 10:43 AM    Magnesium 2.3 11/06/2017 10:43 AM    INR 1.6 11/06/2017 10:43 AM       UA:   Lab Results   Component Value Date/Time    Color YELLOW/STRAW 05/21/2017 10:35 PM    Appearance CLEAR 05/21/2017 10:35 PM    Specific gravity 1.012 05/21/2017 10:35 PM    pH (UA) 7.5 05/21/2017 10:35 PM    Protein TRACE 05/21/2017 10:35 PM    Glucose NEGATIVE  05/21/2017 10:35 PM    Ketone NEGATIVE  05/21/2017 10:35 PM    Bilirubin NEGATIVE  05/21/2017 10:35 PM    Urobilinogen 1.0 05/21/2017 10:35 PM    Nitrites NEGATIVE  05/21/2017 10:35 PM    Leukocyte Esterase NEGATIVE  05/21/2017 10:35 PM    Epithelial cells FEW 05/21/2017 10:35 PM    Bacteria NEGATIVE  05/21/2017 10:35 PM    WBC 0-4 05/21/2017 10:35 PM    RBC 5-10 05/21/2017 10:35 PM       Cultures:   Xrays:     Assessment/Plan:     1. Retention from imobility, being diuresed with heart failure. Placed 12Fr coude, would remove only after diuresis. Please call back with any questions.     Signed By: Franklyn Ceja MD  - November 6, 2017

## 2017-11-07 NOTE — CONSULTS
Palliative Medicine Note    Spoke with patient's brother (lisa) [employer]. [facility] [address] [facility] and not able to visit this afternoon. We made plan for meeting with him, patient and palliative team tomorrow at 10am. (Wednesday)  This meeting is to discuss current medical issues, care options and goals of care and code status. I asked Thomas Left to please bring a copy of the patient's AMD if possible.

## 2017-11-07 NOTE — CONSULTS
Palliative Medicine Consult  [place]: [phone] ([phone])    Patient Name: Anderson Allison. YOB: 1940    Date of Initial Consult: 11/7/17  Reason for Consult: care decisions  Requesting Provider: Dr. Alannah Mcwilliams   Primary Care Physician: Francis Can MD     SUMMARY:   Anderson Hogan is a 78 y.o. with a past history of ischemic cardiomyopathy s/p CABG 2006, 2009, AICD replaced 2015, metastatic renal cell cancer dx Dec 2016 on chemo, now with recurrence (plan for switch to cabozantinib) followed by Dr. Ned Weston, CKD stage 4, who was admitted on 11/6/2017 from home with a diagnosis of SOB and fluid retention. Current medical issues leading to Palliative Medicine involvement include: end stage disease (cardiac and renal cell cancer)    Patient is , he has 4 grown children. He lives with one of his sisters, Eloy Sandy. He says he completed AMD many years ago, appointed his brother Vishal Ruiz as mPOA. PALLIATIVE DIAGNOSES:   1. Dyspnea with exertion  2. End stage ischemic cardiomyopathy  3. Stage IV renal cell cancer  4. CKD stage 4  5. Debility from multiple medical issues. 6. Anorexia, altered taste  7. hypoalbuminemia       PLAN:   1. Palliative Medicine Services introduced to patient. Including Windy Casanova LCSW (see her note also)  1. Discussed recent medical events, how things are going at home. 2. Patient has a sense that \"time is short, although no one has told me that\". Supportive listening as patient tells how he feels body is starting to get weaker, knows his heart is very weak. 3. He is most worried about disputes ongoing in the family right now, that really upsets him. He is hopeful his sisters are working this out. 4. Patient completed AMD years ago. His brother is Margot Shelley. I asked patient to have his sister bring a copy of the AMD to the hospital next time she visits.    5. He also has a brother Belemgiuliana Zee, who is a deacon in [place], and he's planning to talk with him about some things. 6. Education provided about DDNR, patient understands this wouldn't change things we are doing for him right now to try to improve symptoms, help him live longer. He would like for me to return when his brother is here so both can receive information. 2. I returned twice to room and did not find patient's brother present, patient says he's still expecting him. Asked nursing staff to call if he arrives, my card is also on bedside table. 3. Will continue to follow for further discussions about care goals. 4. Initial consult note routed to primary continuity provider  5. Communicated plan of care with: Palliative IDT       GOALS OF CARE / TREATMENT PREFERENCES:   [====Goals of Care====]  GOALS OF CARE:  Patient / health care proxy stated goals: not finished discussing this yet. TREATMENT PREFERENCES:   Code Status: Full Code    Advance Care Planning:  Advance Care Planning 5/2/2016   Patient's Healthcare Decision Maker is: Legal Next of Kin   Primary Decision Maker Name Snow Quach8   Primary Decision Maker Phone Number [phone]   Primary Decision Maker Relationship to Patient -   Secondary Decision Maker Name -   Confirm Advance Directive Yes, not on file   Patient Would Like to Complete Advance Directive -       Other:    The palliative care team has discussed with patient / health care proxy about goals of care / treatment preferences for patient.  [====Goals of Care====]         HISTORY:     History obtained from: chart, patient    CHIEF COMPLAINT: Admitted with shortness of breath    HPI/SUBJECTIVE:    The patient is:   [x] Verbal and participatory  [] Non-participatory due to:     78year old male with complex medical history as above, who is admitted with shortness of breath and fluid retention. He's been living with his sister Earlene Davies (and her grandson age about 21), prior to that he lives with sister Ashly Puri.   He moved to [facility] because he was no longer able to climb the stairs at [facility]'s house. He has a wheelchair, bedside commode, shower chair at home. He hasn't had many good days lately. Unable to say what he does on a good day. \"My family keeps telling me I can do this, but I don't feel like I can\"  \"feel like time is short, although no one's told me that\"  \"my heart is so weak, I'm tired. \"      Clinical Pain Assessment (nonverbal scale for severity on nonverbal patients):   [++++ Clinical Pain Assessment++++]  [++++Pain Severity++++]: Pain: 0  [++++Pain Character++++]:   [++++Pain Duration++++]:   [++++Pain Effect++++]:   [++++Pain Factors++++]:   [++++Pain Frequency++++]:   [++++Pain Location++++]:   [++++ Clinical Pain Assessment++++]  Duration: for how long has pt been experiencing pain (e.g., 2 days, 1 month, years)  Frequency: how often pain is an issue (e.g., several times per day, once every few days, constant)     FUNCTIONAL ASSESSMENT:     Palliative Performance Scale (PPS):  PPS: 50       PSYCHOSOCIAL/SPIRITUAL SCREENING:     Advance Care Planning:  Advance Care Planning 5/2/2016   Patient's Healthcare Decision Maker is: Legal Next of Maye 71   Primary Decision Maker Name Snow [facility]   Primary Decision Maker Phone Number [phone]   Primary Decision Maker Relationship to Patient -   Secondary Decision [address] Name -   Confirm Advance Directive Yes, not on file   Patient Would Like to Complete Advance Directive -        Any spiritual / Yazdanism concerns:  [] Yes /  [x] No    Caregiver Burnout:  [] Yes /  [x] No /  [] No Caregiver Present      Anticipatory grief assessment:   [x] Normal  / [] Maladaptive       ESAS Anxiety: Anxiety: 0    ESAS Depression: Depression: 0        REVIEW OF SYSTEMS:     Positive and pertinent negative findings in ROS are noted above in HPI. The following systems were [x] reviewed / [] unable to be reviewed as noted in HPI  Other findings are noted below.   Systems: constitutional, ears/nose/mouth/throat, respiratory, gastrointestinal, genitourinary, musculoskeletal, integumentary, neurologic, psychiatric, endocrine. Positive findings noted below. Modified ESAS Completed by: provider   Fatigue: 6 Drowsiness: 0   Depression: 0 Pain: 0   Anxiety: 0 Nausea: 0   Anorexia: 5 Dyspnea: 5     Constipation: No              PHYSICAL EXAM:     From RN flowsheet:  Wt Readings from Last 3 Encounters:   11/07/17 223 lb 1.7 oz (101.2 kg)   10/27/17 227 lb 3.2 oz (103.1 kg)   09/29/17 222 lb 1 oz (100.7 kg)     Blood pressure (!) 94/38, pulse 70, temperature 97.4 °F (36.3 °C), resp. rate 17, height 5' 9\" (1.753 m), weight 223 lb 1.7 oz (101.2 kg), SpO2 96 %.     Pain Scale 1: Numeric (0 - 10)  Pain Intensity 1: 0                 Last bowel movement, if known:     Constitutional: pt is awake, alert, sitting up in chair NAD  Eyes: pupils equal, anicteric  No respiratory distress  Normal affect, speech is fluent  Insight is good     HISTORY:     Active Problems:    Chronic systolic heart failure (HCC) (4/27/2012)      DM (diabetes mellitus) ([address] (4/27/2012)      A-fib ([address] (12/17/2015)      Metastatic cancer ([address] (11/6/2017)      CKD (chronic kidney disease) (11/6/2017)      Hyponatremia (11/6/2017)      Past Medical History:   Diagnosis Date    CAD (coronary artery disease)     h/o CABG    Cancer ([address]     kidney cancer diagnosed Feb 2017    Chronic systolic heart failure (HCC)     Coronary atherosclerosis of native coronary artery     Diabetes ([address]     Heart failure ([address]     Hypertension     Other acute and subacute form of ischemic heart disease     Other ill-defined conditions(799.89)     elevated cholesterol    Paroxysmal ventricular tachycardia ([address]     Presence of biventricular automatic cardioverter/defibrillator (AICD) 5/2/2016 5/2/16 [employer] upgrade to biventricular AICD implant    S/P ablation of atrial fibrillation 1/8/2016      Past Surgical History:   Procedure Laterality Date    CARDIAC SURG PROCEDURE UNLIST  2005 CABG    COLONOSCOPY N/A 8/28/2017    COLONOSCOPY performed by Sundeep Herron MD at \[facility]\"" AMBULATORY OR    HX PACEMAKER        Family History   Problem Relation Age of Onset    Heart Disease Mother     Hypertension Mother     Diabetes Mother     Diabetes Father     Heart Disease Father     Hypertension Father       History reviewed, no pertinent family history.   Social History   Substance Use Topics    Smoking status: Former Smoker     Quit date: 1/8/2005    Smokeless tobacco: Former User     Quit date: 4/9/2011    Alcohol use No     No Known Allergies   Current Facility-Administered Medications   Medication Dose Route Frequency    sodium chloride (NS) flush 5-10 mL  5-10 mL IntraVENous Q8H    sodium chloride (NS) flush 5-10 mL  5-10 mL IntraVENous PRN    acetaminophen (TYLENOL) tablet 650 mg  650 mg Oral Q4H PRN    ondansetron (ZOFRAN) injection 4 mg  4 mg IntraVENous Q4H PRN    bisacodyl (DULCOLAX) suppository 10 mg  10 mg Rectal DAILY PRN    amiodarone (CORDARONE) tablet 200 mg  200 mg Oral DAILY    DOBUTamine (DOBUTREX) 1,000 mg/250 mL (4,000 mcg/mL) infusion  5 mcg/kg/min IntraVENous TITRATE    insulin lispro (HUMALOG) injection   SubCUTAneous AC&HS    glucose chewable tablet 16 g  4 Tab Oral PRN    dextrose (D50W) injection syrg 12.5-25 g  12.5-25 g IntraVENous PRN    glucagon (GLUCAGEN) injection 1 mg  1 mg IntraMUSCular PRN    insulin lispro (HUMALOG) injection 5 Units  5 Units SubCUTAneous TIDAC    aspirin chewable tablet 81 mg  81 mg Oral DAILY    amoxicillin (AMOXIL) capsule 500 mg  500 mg Oral Q12H    tamsulosin (FLOMAX) capsule 0.4 mg  0.4 mg Oral DAILY    insulin glargine (LANTUS) injection 20 Units  20 Units SubCUTAneous DAILY          LAB AND IMAGING FINDINGS:     Lab Results   Component Value Date/Time    WBC 29.6 11/07/2017 02:53 AM    HGB 8.1 11/07/2017 02:53 AM    PLATELET 421 05/02/7721 02:53 AM     Lab Results   Component Value Date/Time    Sodium 129 11/07/2017 02:53 AM    Potassium 5.0 11/07/2017 02:53 AM    Chloride 97 11/07/2017 02:53 AM    CO2 23 11/07/2017 02:53 AM    BUN 56 11/07/2017 02:53 AM    Creatinine 2.22 11/07/2017 02:53 AM    Calcium 8.7 11/07/2017 02:53 AM    Magnesium 2.1 11/07/2017 02:53 AM      Lab Results   Component Value Date/Time    AST (SGOT) 20 11/06/2017 10:43 AM    Alk. phosphatase 292 11/06/2017 10:43 AM    Protein, total 6.6 11/06/2017 10:43 AM    Albumin 2.2 11/06/2017 10:43 AM    Globulin 4.4 11/06/2017 10:43 AM     Lab Results   Component Value Date/Time    INR 1.6 11/06/2017 10:43 AM    Prothrombin time 16.7 11/06/2017 10:43 AM    aPTT 28.7 08/04/2009 12:35 PM      Lab Results   Component Value Date/Time    Iron 20 10/20/2017 08:17 AM    TIBC 184 10/20/2017 08:17 AM    Iron % saturation 11 10/20/2017 08:17 AM    Ferritin 173 10/20/2017 08:17 AM      No results found for: PH, PCO2, PO2  No components found for: [place]   Lab Results   Component Value Date/Time    CK 45 05/21/2017 09:33 PM    CK - MB <1.0 05/21/2017 09:33 PM                Total time: 50 min  Counseling / coordination time, spent as noted above: 40  > 50% counseling / coordination?: yes    Prolonged service was provided for  []30 min   []75 min in face to face time in the presence of the patient, spent as noted above. Time Start:   Time End:   Note: this can only be billed with 11371 (initial) or 34587 (follow up). If multiple start / stop times, list each separately.

## 2017-11-07 NOTE — PROGRESS NOTES
Etienne Lang, RN on day shift attempted to insert #18 Coude catheter due to urinary retention. Unable to advance. 350 UOP out per Coude, 635 post void residual. On Dobutamine drip due to heart failure. 1940   Dr Lacie Woodard called at #[phone] to notify of inability to place Coude. Orders received for Urology consult. [facility] called to notify of consult. Dr Bettina Hammans paged. BPH, urinary retention (Dr Milagro Richards consulted at [phone], not able to insert name in consult order)  2002  Dr Milagro Richards returned call. Orders received to place Coude. 2011  Nursing supervisor Krystal Ross called to get #16 Coude, requested from [address].  2044  Dr Bettina Hammans called to check on pt. Lubricant inserted in urethra per order. Attempted #16 Coude after placing on ice. Advanced, no UOP. #18 Coude on ice attempted, unable to advance. Humidified oxygen @ 2L admin due to tachypnea, sats on room air 97%. 2055  [facility] nurse contacted to assist with insertion of #16 Coude, No UOP. Dr Bettina Hammans paged. 2106  Dr Bettina Hammans called,updates given. Urology cart ordered and xylocaine jelly (\"[facility]\"). [facility]  notified to send xylocaine gel STAT. 2200  #16 Coude catheter placed by Dr Milagro Richards, Urologist with [facility] (lidocaine gel). Procedure well tolerated, small amount leticia yellow urine noted. 2300  Poor UOP persists despite 80mg IV lasix in ED and 40mg IV lasix in PCU on day shift. Dr Lacie Woodard notified, orders for 500ml bolus 0.9% NaCl obtained after clearance with Cardiology. 2312  Dr Annita Kern, Cardiologist [phone] notified of worsening pt condition, no UOP. Agreed with Dr Adam Purchase order 0.9% NaCl 500ml over 2 hours. No new orders received. 0000  500 ml bolus 0.9% NaCl infusing over 2 hours. #20 ga IV started in RH.  0630  UOP approx 30ml/hr. SBP >90 pr MAP >60 on Dobutamine @ 5mcg/kg/min.

## 2017-11-07 NOTE — CARDIO/PULMONARY
Cardiopulmonary Rehab Nursing Entry:    Chart reviewed and pt visited. Pt 79 yo admitting diagnoses: acute on chronic systolic HF, s/p BIV-ICD, ICM, a-fib, EF 15-20%, renal CA with mets, former tobacco use. CardioPulm Rehab: Chart reviewed and pt visited for CHF teaching. The CHF teaching packet was provided. The pt also received information regarding the low sodium diet. Brief instruction given on s/s of CHF, checking weight every am and calling MD if weight is up 2-3 lbs in a day or 5 lbs in a week (or as directed by the physician), fluid/Na restrictions, s/s of worsening CHF and when to call MD.  Discussed the CHF \"zones\" and subsequent actions with pt. Reviewed activity as tolerated with frequent rest periods as needed, taking medications as prescribed. Pt very drowsy and reported not feeling very well. Session kept brief. Smoking history assessed, former smoker    Will f/u with pt for teaching reinforcement during this admission.

## 2017-11-07 NOTE — PROGRESS NOTES
[address] [place] [address]  [phone]      Cardiology Progress Note      11/7/2017 5:47 PM    Admit Date: 11/6/2017    Admit Diagnosis:   Heart failure ([address]    Subjective:     Dayna Feast.      Visit Vitals    BP (!) 98/38 (BP 1 Location: Left arm, BP Patient Position: At rest)    Pulse 70    Temp 97.3 °F (36.3 °C)    Resp 16    Ht 5' 9\" (1.753 m)    Wt 223 lb 1.7 oz (101.2 kg)    SpO2 97%    BMI 32.95 kg/m2       Current Facility-Administered Medications   Medication Dose Route Frequency    furosemide (LASIX) 10 mg/ml infusion  10 mg/hr IntraVENous CONTINUOUS    sodium chloride (NS) flush 5-10 mL  5-10 mL IntraVENous Q8H    sodium chloride (NS) flush 5-10 mL  5-10 mL IntraVENous PRN    acetaminophen (TYLENOL) tablet 650 mg  650 mg Oral Q4H PRN    ondansetron (ZOFRAN) injection 4 mg  4 mg IntraVENous Q4H PRN    bisacodyl (DULCOLAX) suppository 10 mg  10 mg Rectal DAILY PRN    amiodarone (CORDARONE) tablet 200 mg  200 mg Oral DAILY    DOBUTamine (DOBUTREX) 1,000 mg/250 mL (4,000 mcg/mL) infusion  5 mcg/kg/min IntraVENous TITRATE    insulin lispro (HUMALOG) injection   SubCUTAneous AC&HS    glucose chewable tablet 16 g  4 Tab Oral PRN    dextrose (D50W) injection syrg 12.5-25 g  12.5-25 g IntraVENous PRN    glucagon (GLUCAGEN) injection 1 mg  1 mg IntraMUSCular PRN    insulin lispro (HUMALOG) injection 5 Units  5 Units SubCUTAneous TIDAC    aspirin chewable tablet 81 mg  81 mg Oral DAILY    amoxicillin (AMOXIL) capsule 500 mg  500 mg Oral Q12H    insulin glargine (LANTUS) injection 20 Units  20 Units SubCUTAneous DAILY       Objective:      Physical Exam:  General Appearance:    Chest:   Clear  Cardiovascular:  Regular rate and rhythm, no murmur.   Abdomen:   Soft, non-tender, bowel sounds are active.   Extremities:   Skin:  Warm and dry.     Data Review:   Recent Labs      11/07/17   0253  11/06/17   1043   WBC  29.6*  27.3*   HGB  8.1*  9.2*   HCT  25.8* 30.5*   PLT  321  322     Recent Labs      11/07/17   0253  11/06/17   1043   NA  129*  129*   K  5.0  4.3   CL  97  93*   CO2  23  28   GLU  178*  358*   BUN  56*  52*   CREA  2.22*  2.31*   CA  8.7  8.9   MG  2.1  2.3   ALB   --   2.2*   TBILI   --   2.7*   SGOT   --   20   ALT   --   12   INR   --   1.6*       Recent Labs      11/06/17   1043   TROIQ  0.04         Intake/Output Summary (Last 24 hours) at 11/07/17 1747  Last data filed at 11/07/17 0703   Gross per 24 hour   Intake           594.41 ml   Output              690 ml   Net           -95.59 ml        Telemetry:   EKG:  Cxray:    Assessment:     Active Problems:    Chronic systolic heart failure ([facility] (4/27/2012)      DM (diabetes mellitus) ([facility] (4/27/2012)      A-fib ([facility] (12/17/2015)      Metastatic cancer ([facility] (11/6/2017)      CKD (chronic kidney disease) (11/6/2017)      Hyponatremia (11/6/2017)        Plan:     Poor urine output. Nephrology note re HD noted and agree. I believe he agrees as well. He has outlived all 11 siblings already and has realistic expectations.

## 2017-11-07 NOTE — PROGRESS NOTES
Occupational Therapy  Orders received and medical record reviewed. Nursing cleared pt for therapy, but will now need to provide insulin and draw labs, and pt will eat his meal tray. Will defer and continue to follow.

## 2017-11-07 NOTE — PROGRESS NOTES
Cross Cover:    Patient admitted with CHF and is on Dobutamine gtt and has not voided with high dose Lasix. Coude catheter in place, appreciate Urology assistance. Suspect patient is intravascularly dry despite edema. I requested RN to call Cardiology and I have ordered a 500 mL bolus to be given over 120 minutes unless Cardiology decides to stop.

## 2017-11-08 NOTE — PROGRESS NOTES
Hematology Oncology Progress Note         Follow up for: metastatic RCC     Chart notes reviewed since last visit. Case discussed with following: patient. Family at bedside. Patient complains of the following: doing a bit better today. Breathing is improved. Additional concerns noted by the staff:     Patient Vitals for the past 24 hrs:   BP Temp Pulse Resp SpO2 Weight   11/08/17 1527 100/43 97.7 °F (36.5 °C) 70 19 98 % -   11/08/17 1130 (!) 83/48 - - - - -   11/08/17 1129 103/42 - - - - -   11/08/17 1117 101/45 97.7 °F (36.5 °C) 74 21 97 % -   11/08/17 0945 91/43 97.5 °F (36.4 °C) 73 17 95 % -   11/08/17 0803 99/43 97.7 °F (36.5 °C) 70 23 96 % -   11/08/17 0658 100/52 - 70 - - -   11/08/17 0648 (!) 88/37 - 70 - - -   11/08/17 0646 (!) 88/37 - 70 20 97 % -   11/08/17 0600 (!) 90/33 - 70 22 96 % -   11/08/17 0530 - - 71 22 96 % -   11/08/17 0500 95/42 - 70 17 98 % -   11/08/17 0447 - - - - - 98 kg (216 lb 0.8 oz)   11/08/17 0430 (!) 102/39 - 70 19 98 % -   11/08/17 0400 97/40 98 °F (36.7 °C) 71 16 98 % -   11/08/17 0330 100/44 - 70 18 99 % -   11/08/17 0300 102/48 - 71 22 98 % -   11/08/17 0230 101/45 - 70 27 96 % -   11/08/17 0200 92/50 - 70 14 94 % -   11/08/17 0130 (!) 97/38 - 73 21 94 % -   11/08/17 0100 96/50 - 70 22 93 % -   11/08/17 0030 99/41 - 70 22 - -   11/08/17 0000 102/46 98 °F (36.7 °C) 70 21 - -   11/07/17 2330 99/40 - 70 27 97 % -   11/07/17 2300 102/47 - 71 22 96 % -   11/07/17 2200 102/40 - 72 23 96 % -   11/07/17 2100 (!) 87/37 - 70 9 97 % -   11/07/17 2000 105/43 - 70 21 96 % -   11/07/17 1948 (!) 83/27 - 70 21 97 % -   11/07/17 1947 (!) 84/36 98.1 °F (36.7 °C) 70 21 97 % -     ROS negative for 11 organ systems except as noted. Physical Examination:  Constitutional Alert, cooperative, oriented. Mood and affect appropriate. Appears close to chronological age. Well nourished. Well developed. Head Normocephalic; no scars   Eyes Conjunctivae and sclerae are clear and without icterus. Pupils are reactive and equal.   ENMT Sinuses are nontender. No oral exudates, ulcers, masses, thrush or mucositis. Oropharynx clear. Tongue normal.   Neck Supple without masses or thyromegaly. No jugular venous distension. Hematologic/Lymphatic No petechiae or purpura. No tender or palpable lymph nodes noted   Respiratory Lungs are clear to auscultation   Cardiovascular Regular rate and rhythm of heart without murmurs, gallops or rubs. Chest / Line Site Chest is symmetric with no chest wall deformities. Abdomen Non-tender, non-distended, no masses, ascites or hepatosplenomegaly. Good bowel sounds. No guarding or rebound tenderness. Musculoskeletal No tenderness or swelling, normal range of motion without obvious weakness. Extremities No visible deformities, no cyanosis, clubbing or edema. Skin No rashes, scars, or lesions suggestive of malignancy. No petechiae, purpura, or ecchymoses. No excoriations. Neurologic No sensory or motor deficits noted but not specifically tested. Sitting up in wheelchair comfortably. Psychiatric Alert and oriented. Coherent speech. Verbalizes understanding of our discussions today.        Labs:  Recent Results (from the past 24 hour(s))   GLUCOSE, POC    Collection Time: 11/07/17  9:39 PM   Result Value Ref Range    Glucose (POC) 131 (H) 65 - 100 mg/dL    Performed by [address] [place] [address], [facility]    Collection Time: 11/08/17  4:25 AM   Result Value Ref Range    Sodium 133 (L) 136 - 145 mmol/L    Potassium 4.1 3.5 - 5.1 mmol/L    Chloride 98 97 - 108 mmol/L    CO2 24 21 - 32 mmol/L    Anion gap 11 5 - 15 mmol/L    Glucose 125 (H) 65 - 100 mg/dL    BUN 54 (H) 6 - 20 MG/DL    Creatinine 2.11 (H) 0.70 - 1.30 MG/DL    BUN/Creatinine ratio 26 (H) 12 - 20      GFR est AA 37 (L) >60 ml/min/1.73m2    GFR est non-AA 31 (L) >60 ml/min/1.73m2    Calcium 8.8 8.5 - 10.1 MG/DL   CBC WITH AUTOMATED DIFF    Collection Time: 11/08/17  4:25 AM   Result Value Ref Range    WBC 28.1 (H) 4.1 - 11.1 K/uL    RBC 3.12 (L) 4.10 - 5.70 M/uL    HGB 8.1 (L) 12.1 - 17.0 g/dL    HCT 24.5 (L) 36.6 - 50.3 %    MCV 78.5 (L) 80.0 - 99.0 FL    MCH 26.0 26.0 - 34.0 PG    MCHC 33.1 30.0 - 36.5 g/dL    RDW 17.5 (H) 11.5 - 14.5 %    PLATELET 510 144 - 344 K/uL    NEUTROPHILS 91 (H) 32 - 75 %    LYMPHOCYTES 4 (L) 12 - 49 %    MONOCYTES 5 5 - 13 %    EOSINOPHILS 0 0 - 7 %    BASOPHILS 0 0 - 1 %    ABS. NEUTROPHILS 25.6 (H) 1.8 - 8.0 K/UL    ABS. LYMPHOCYTES 1.1 0.8 - 3.5 K/UL    ABS. MONOCYTES 1.4 (H) 0.0 - 1.0 K/UL    ABS. EOSINOPHILS 0.0 0.0 - 0.4 K/UL    ABS. BASOPHILS 0.0 0.0 - 0.1 K/UL    RBC COMMENTS ANISOCYTOSIS  1+        WBC COMMENTS TOXIC GRANULATION      DF SMEAR SCANNED     MAGNESIUM    Collection Time: 11/08/17  4:25 AM   Result Value Ref Range    Magnesium 2.1 1.6 - 2.4 mg/dL   PHOSPHORUS    Collection Time: 11/08/17  4:25 AM   Result Value Ref Range    Phosphorus 3.8 2.6 - 4.7 MG/DL   GLUCOSE, POC    Collection Time: 11/08/17  7:28 AM   Result Value Ref Range    Glucose (POC) 109 (H) 65 - 100 mg/dL    Performed by Evelia Galindo    GLUCOSE, POC    Collection Time: 11/08/17 11:08 AM   Result Value Ref Range    Glucose (POC) 142 (H) 65 - 100 mg/dL    Performed by Brandy Soto    GLUCOSE, POC    Collection Time: 11/08/17  3:30 PM   Result Value Ref Range    Glucose (POC) 119 (H) 65 - 100 mg/dL    Performed by Kimberley Sanchez and Plan:   Metastatic RCC - had evidence of progressive disease on last scan. Dr. Lars Bliss was trying to get cabozantinib approved for him. The opdivo (nivolumab) is being stopped for lack of significant efficacy. Have read earlier notes and appreciate conundrum of his poor cardiac status and overall poor prognosis relative to his progressive cancer. We can certainly refer to hospice as outpatient if in fact, he improves enough to be discharged and get to the office reliably to be monitored on a new oral molecularly targeted therapy. Acute on chronic systolic heart failure - on dobutamine    Has BIV-ICD    Type 2 DM    Anemia with prior GI bleed. Off heparin for now given drop in hgb.

## 2017-11-08 NOTE — PROGRESS NOTES
[address] [place] [address] [address] [facility]  [phone]      Cardiology Progress Note      11/8/2017 2:20 PM    Admit Date: 11/6/2017    Admit Diagnosis:   Heart failure (HCC)    Subjective:     Anna Quintana is a 78 y.o. 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 Overnight events:  -BP intermittently low  - H/H steady; creat down to 2.11; Na improve to 133; K improve to 4.1   -weight down 7#; I/O neg 500ml  -Mr. Linh Obrien states he's feeling slightly better today, but he remains SOB. He denies chest pain, palpitations.       Visit Vitals    BP (!) 83/48    Pulse 74    Temp 97.7 °F (36.5 °C)    Resp 21    Ht 5' 9\" (1.753 m)    Wt 98 kg (216 lb 0.8 oz)    SpO2 97%    BMI 31.91 kg/m2       Current Facility-Administered Medications   Medication Dose Route Frequency    furosemide (LASIX) 10 mg/ml infusion  10 mg/hr IntraVENous CONTINUOUS    sodium chloride (NS) flush 5-10 mL  5-10 mL IntraVENous Q8H    sodium chloride (NS) flush 5-10 mL  5-10 mL IntraVENous PRN    acetaminophen (TYLENOL) tablet 650 mg  650 mg Oral Q4H PRN    ondansetron (ZOFRAN) injection 4 mg  4 mg IntraVENous Q4H PRN    bisacodyl (DULCOLAX) suppository 10 mg  10 mg Rectal DAILY PRN    amiodarone (CORDARONE) tablet 200 mg  200 mg Oral DAILY    DOBUTamine (DOBUTREX) 1,000 mg/250 mL (4,000 mcg/mL) infusion  5 mcg/kg/min IntraVENous TITRATE    insulin lispro (HUMALOG) injection   SubCUTAneous AC&HS    glucose chewable tablet 16 g  4 Tab Oral PRN    dextrose (D50W) injection syrg 12.5-25 g  12.5-25 g IntraVENous PRN    glucagon (GLUCAGEN) injection 1 mg  1 mg IntraMUSCular PRN    insulin lispro (HUMALOG) injection 5 Units  5 Units SubCUTAneous TIDAC    aspirin chewable tablet 81 mg  81 mg Oral DAILY    insulin glargine (LANTUS) injection 20 Units  20 Units SubCUTAneous DAILY       Objective:      Physical Exam:  General: pleasant, obese AAM sitting in chair in NAD  Heart: RRR, 2/6 systolic murmur  Lungs: clear/dim   Abdomen: Soft, +BS, NTND   Extremities: LE rajan +DP/PT, 3+ pitting edema to waist BLE; 1+ pitting edema BUE   Neurologic: Grossly normal  Skin:  Warm and dry.     Data Review:   Recent Labs      11/08/17 0425 11/07/17 0253 11/06/17   1043   WBC  28.1*  29.6*  27.3*   HGB  8.1*  8.1*  9.2*   HCT  24.5*  25.8*  30.5*   PLT  296  321  322     Recent Labs      11/08/17 0425 11/07/17 0253 11/06/17   1043   NA  133*  129*  129*   K  4.1  5.0  4.3   CL  98  97  93*   CO2  24  23  28   GLU  125*  178*  358*   BUN  54*  56*  52*   CREA  2.11*  2.22*  2.31*   CA  8.8  8.7  8.9   MG  2.1  2.1  2.3   PHOS  3.8   --    --    ALB   --    --   2.2*   TBILI   --    --   2.7*   SGOT   --    --   20   ALT   --    --   12   INR   --    --   1.6*       Recent Labs      11/06/17   1043   TROIQ  0.04         Intake/Output Summary (Last 24 hours) at 11/08/17 1446  Last data filed at 11/08/17 0700   Gross per 24 hour   Intake           404.81 ml   Output             1180 ml   Net          -775.19 ml        Telemetry: AV paced  ECG: v-paced   Echocardiogram: EF 40-45%; mod diffuse hypokinesis; LA dilation; mild MR; mod TR; mod PHTN  CXRAY:small R effusion     Assessment:     Active Problems:    Chronic systolic heart failure ([facility] (4/27/2012)      DM (diabetes mellitus) ([facility] (4/27/2012)      A-fib ([facility] (12/17/2015)      Metastatic cancer ([facility] (11/6/2017)      CKD (chronic kidney disease) (11/6/2017)      Hyponatremia (11/6/2017)        Plan:     Chronic systolic heart failure: presented for worsening peripheral edema after being off diuretics for two weeks. Admission weight almost 20# above his regular home weight. With hyponatremia, hypotension, CKD, low albumin on admission. · Patient with improved diuresis on dobutamine and lasix drip and slight improvement in creatinine.   · Hyponatremia improving with diuresis  · BB and ACI on hold  · Repeat ECHO with improved EF, however, patient was on dobutamine during exam      A-fib:  Currently AV pacing  · Consult from GI noted - will restart patient's eliquis and stop ASA  · Watch H/H with initiation of eliquis  · amio PO      BARBARA:  Creatinine improving slightly  · On dobutamine and lasix as above  · Nephrology following:  See that patient is not a likely HD candidate        Lizy Quinteros NP  DNP, RN, AGACNP-BC

## 2017-11-08 NOTE — CDMP QUERY
Account Number: [de-identified]  MRN: [id number]  Patient: Lonnie Spurling   Created: [id number]  Clinician Name: Jackie Subramanian RN, CCDS   Dr. Oswald Yuen MD :  CMS considers documentation to be conflicting in nature when one condition is diagnosed as two different conditions by the same or different physicians. In this instance, the patient presented with Acute on chronic systolic heart failure s/p BIV-ICD  Ischemic cardiomyopathy  Persistent Atrial fibrillation. Cardiology c/s, Dr. Estela Huntley documents \"Chronic systolic heart failure: presented for worsening peripheral edema after being off diuretics for two weeks\"  but the same condition is documented as Acute on Chronic Systolic CHF  by Dr. Kenton Simmons & nephrologist.      Please clarify the patient's condition if possible:   => Acute on Chronic Systolic CHF POA   => Chronic Systolic CHF POA  Other Explanation (please specify)  Unable to Determine    The medical record reflects the following risk factors, clinical indicators, and treatment    Risk Factors:  off bumex for 2 wks d/t worsening renal failure, ICM, Afib  Clinical Indicators:  SOB, 20# wgt gain, bilateral LE edema CXR pulmonary edema / persistent small Right pleural effusion. Treatment: monitor lytes, daily wt, fluid restriction, dobutamine and IV diuresis. Please clarify and document your clinical opinion in the progress notes and discharge summary including the definitive and/or presumptive diagnosis, (suspected or probable), related to the above clinical findings. Please include clinical findings supporting your diagnosis.     Thank Jose C Guillermo RN, CCDS

## 2017-11-08 NOTE — PROGRESS NOTES
1910 Bedside shift change report given to [facility] RN(oncoming nurse) by Keyonna Patel (offgoing nurse). Report included the following information SBAR, Kardex, Intake/Output, MAR, Recent Results and Cardiac Rhythm Paced. Patient awake denies pain or sob. SBP 80. Titrated up dobutamine to 9 mcg/kg/min with good response. SBP up to 100. Lasix drip continuous rate of 10 mg/hr. Strict intake  and output. Explained importance of turning in bed. Heels off loaded with pillows , skin noted to be intact and dry applied Mepilex on back. 2300 complained of nausea, denies pain, abdomen soft, Zofran as order given. 12 am patient awake , verbalized had an accident in bed. Large brown stool. Clean and applied zinc oxide cream to buttocks. Denies pain or discomfort. No arrhythmias or shortness of breath. verbalized relief from nausea. 4 am no changes with assessment. 5 beats of Vtach noted. Asymptomatic. Cleaning patient from bowel movement. Send lytes. Awaiting result. 6 am. Lab result stable. K4.1. Denies pain , nausea or sob. 715 Bedside and Verbal shift change report given to Keyonna Patel  (oncoming nurse) by [facility] RN (offgoing nurse). Report included the following information SBAR, Intake/Output, MAR, Recent Results and Cardiac Rhythm Paced.

## 2017-11-08 NOTE — CONSULTS
Palliative Medicine Consult  [place]: [phone] ([phone])    Patient Name: Fletcher Chacon. YOB: 1940    Date of Initial Consult: 11/7/17  Reason for Consult: care decisions  Requesting Provider: Dr. Sirena Deng   Primary Care Physician: Andreas Kwong MD     SUMMARY:   Fletcher Alamo is a 78 y.o. with a past history of ischemic cardiomyopathy s/p CABG 2006, 2009, AICD replaced 2015, metastatic renal cell cancer dx Dec 2016 on chemo, now with recurrence (plan for switch to cabozantinib) followed by Dr. Cecilia Kolb, CKD stage 4, who was admitted on 11/6/2017 from home with a diagnosis of SOB and fluid retention. Current medical issues leading to Palliative Medicine involvement include: end stage disease (cardiac and renal cell cancer)    Patient is , he has 4 grown children. He lives with one of his sisters, Leonarda Bosworth. He has not yet completed AMD but wants to appoint his brother Alpesh Gonsalves as mPOA. PALLIATIVE DIAGNOSES:   1. Dyspnea with exertion  2. Fatigue, inanition  3. End stage ischemic cardiomyopathy  4. Stage IV renal cell cancer  5. CKD stage 4, not a candidate for HD  6. Debility from multiple medical issues. 7. Anorexia, altered taste  8. hypoalbuminemia       PLAN:   1. Palliative Medicine Services introduced to patient's brother LA CASA [facility] and patient. 1. We reviewed some of our discussions from yesterday. 2. Provided medical update -- issues of heart and kidney failure, in addition to renal cancer. Talked about current plans to help get fluid off (dobutamine and lasix) but kidneys are struggling, and issue of chemo for cancer will not change the heart and kidney problem. All of these are working together to make him very fragile and to be experiencing more burdensome symptoms (patient says he rarely has a good day now) and have decline in his ability to move around, have appetite and energy.      3. Patient again expressed that he hopes to keep living, but that he knows that's not what's going to happen, he FEELS time is short and he is not afraid. 4. Brother has made decision to move in with patient to help provide 24/7 care. Brother tearful at times. Patient will return to his sister's home, [place] (she works during day full time) and Phi Aguila will be there to help care for him. Patient has a hard time understanding he can't stay in hospital indefinitely but brother helps him understand need for a plan after discharge. 5. We discussed in detail home health (and continue oral chemo) vs. Option of hospice care. What each would look like, philosophy of hospice care, what each does and does not provide. Family asking very insightful questions. We talked about risk that home health would not be enough support to keep him out of the hospital for long, he might be right back. For now, patient expresses desire to continue to pursue chemo and to go home at time of discharge with home health. Family appreciative of information and understand that Dr. Antonio Arzola could help them get hospice services later if they want it. 6. Brother Hmoer, is hoping to have patient's screening for aides in the home completed today while he's here. I have let case management know about this. 7. Patient wants to appoint his brother [facility] as [place]. Plan for our SW to return later today to complete mPOA paperwork. He may or not want to fill out second section, but at least wants to complete mPOA portion. [facility], [address], [place] [address] [facility],  Phone [phone])  8. Education provided about code status. Patient expresses desire to have a natural dying when that time comes. He does not want to be placed on machines. He signed DDNR for himself and brother in agreement to honor his wishes. Patient understands that having DNR order does not change that we are doing all we can right now for him to live longer and better.      2. Initial consult note routed to primary continuity provider  3. Communicated plan of care with: Palliative IDT, bedside nurse, case management       GOALS OF CARE / TREATMENT PREFERENCES:   [====Goals of Care====]  GOALS OF CARE:  Patient / health care proxy stated goals:   Continue all medical interventions that will help him live longer and better. He would not want resuscitation, prefers natural dying when that time comes. He will consider hospice in the future, but right now plans to go home with support of home health       TREATMENT PREFERENCES:   Code Status: DNR    Advance Care Planning:  Advance Care Planning 5/2/2016   Patient's Healthcare Decision Maker is: Legal Next of Kin   Primary Decision Maker Name Snow [id number]   Primary Decision Maker Phone Number [phone]   Primary Decision Maker Relationship to Patient -   Secondary Decision Maker Name -   Confirm Advance Directive Yes, not on file   Patient Would Like to Complete Advance Directive -       Other:    The palliative care team has discussed with patient / health care proxy about goals of care / treatment preferences for patient.  [====Goals of Care====]         HISTORY:     History obtained from: chart, patient    CHIEF COMPLAINT: Admitted with shortness of breath    HPI/SUBJECTIVE:    The patient is:   [x] Verbal and participatory  [] Non-participatory due to:     78year old male with complex medical history as above, who is admitted with shortness of breath and fluid retention. He's been living with his sister Eliana Mathur (and her grandson age about 21), prior to that he lives with sister Frank Sheikh. He moved to [facility] because he was no longer able to climb the stairs at [employer]. He has a wheelchair, bedside commode, shower chair at home. He hasn't had many good days lately. Unable to say what he does on a good day.    \"My family keeps telling me I can do this, but I don't feel like I can\"  \"feel like time is short, although no one's told me that\"  \"my heart is so weak, I'm tired.\"      Clinical Pain Assessment (nonverbal scale for severity on nonverbal patients):   [++++ Clinical Pain Assessment++++]  [++++Pain Severity++++]: Pain: 0  [++++Pain Character++++]:   [++++Pain Duration++++]:   [++++Pain Effect++++]:   [++++Pain Factors++++]:   [++++Pain Frequency++++]:   [++++Pain Location++++]:   [++++ Clinical Pain Assessment++++]  Duration: for how long has pt been experiencing pain (e.g., 2 days, 1 month, years)  Frequency: how often pain is an issue (e.g., several times per day, once every few days, constant)     FUNCTIONAL ASSESSMENT:     Palliative Performance Scale (PPS):  PPS: 50       PSYCHOSOCIAL/SPIRITUAL SCREENING:     Advance Care Planning:  Advance Care Planning 5/2/2016   Patient's Healthcare Decision Maker is: Legal Next merline Villavicencio 71   Primary Decision Maker Name [id number]   Primary Decision Maker Phone Number [phone]   Primary Decision Maker Relationship to Patient -   Secondary Decision Maker Name -   Confirm Advance Directive Yes, not on file   Patient Would Like to Complete Advance Directive -        Any spiritual / Uatsdin concerns:  [] Yes /  [x] No    Caregiver Burnout:  [] Yes /  [x] No /  [] No Caregiver Present      Anticipatory grief assessment:   [x] Normal  / [] Maladaptive       ESAS Anxiety: Anxiety: 0    ESAS Depression: Depression: 0        REVIEW OF SYSTEMS:     Positive and pertinent negative findings in ROS are noted above in HPI. The following systems were [x] reviewed / [] unable to be reviewed as noted in HPI  Other findings are noted below. Systems: constitutional, ears/nose/mouth/throat, respiratory, gastrointestinal, genitourinary, musculoskeletal, integumentary, neurologic, psychiatric, endocrine. Positive findings noted below.   Modified ESAS Completed by: provider   Fatigue: 6 Drowsiness: 0   Depression: 0 Pain: 0   Anxiety: 0 Nausea: 0   Anorexia: 5 Dyspnea: 5     Constipation: No     Stool Occurrence(s): 1        PHYSICAL EXAM:     From RN flowsheet:  Wt Readings from Last 3 Encounters:   11/08/17 216 lb 0.8 oz (98 kg)   10/27/17 227 lb 3.2 oz (103.1 kg)   09/29/17 222 lb 1 oz (100.7 kg)     Blood pressure 101/45, pulse 74, temperature 97.7 °F (36.5 °C), resp. rate 21, height 5' 9\" (1.753 m), weight 216 lb 0.8 oz (98 kg), SpO2 97 %.     Pain Scale 1: Numeric (0 - 10)  Pain Intensity 1: 0                 Last bowel movement, if known:     Constitutional: pt is awake, alert, sitting supported up in bed,  NAD  Eyes: pupils equal, anicteric  No respiratory distress  Normal affect, speech is fluent  Insight is good     HISTORY:     Active Problems:    Chronic systolic heart failure (HCC) (4/27/2012)      DM (diabetes mellitus) ([facility] (4/27/2012)      A-fib ([facility] (12/17/2015)      Metastatic cancer ([facility] (11/6/2017)      CKD (chronic kidney disease) (11/6/2017)      Hyponatremia (11/6/2017)      Past Medical History:   Diagnosis Date    CAD (coronary artery disease)     h/o CABG    Cancer ([facility]     kidney cancer diagnosed Feb 2017    Chronic systolic heart failure (HCC)     Coronary atherosclerosis of native coronary artery     Diabetes ([facility]     Heart failure ([facility]     Hypertension     Other acute and subacute form of ischemic heart disease     Other ill-defined conditions(799.89)     elevated cholesterol    Paroxysmal ventricular tachycardia (HCC)     Presence of biventricular automatic cardioverter/defibrillator (AICD) 5/2/2016 5/2/16 [employer] upgrade to biventricular AICD implant    S/P ablation of atrial fibrillation 1/8/2016      Past Surgical History:   Procedure Laterality Date    CARDIAC SURG PROCEDURE UNLIST  2005    CABG    COLONOSCOPY N/A 8/28/2017    COLONOSCOPY performed by Lima Medina MD at [facility] AM[facility] OR    HX PACEMAKER        Family History   Problem Relation Age of Onset    Heart Disease Mother     Hypertension Mother     Diabetes Mother     Diabetes Father     Heart Disease Father     Hypertension Father       History reviewed, no pertinent family history.   Social History   Substance Use Topics    Smoking status: Former Smoker     Quit date: 1/8/2005    Smokeless tobacco: Former User     Quit date: 4/9/2011    Alcohol use No     No Known Allergies   Current Facility-Administered Medications   Medication Dose Route Frequency    furosemide (LASIX) 10 mg/ml infusion  10 mg/hr IntraVENous CONTINUOUS    sodium chloride (NS) flush 5-10 mL  5-10 mL IntraVENous Q8H    sodium chloride (NS) flush 5-10 mL  5-10 mL IntraVENous PRN    acetaminophen (TYLENOL) tablet 650 mg  650 mg Oral Q4H PRN    ondansetron (ZOFRAN) injection 4 mg  4 mg IntraVENous Q4H PRN    bisacodyl (DULCOLAX) suppository 10 mg  10 mg Rectal DAILY PRN    amiodarone (CORDARONE) tablet 200 mg  200 mg Oral DAILY    DOBUTamine (DOBUTREX) 1,000 mg/250 mL (4,000 mcg/mL) infusion  5 mcg/kg/min IntraVENous TITRATE    insulin lispro (HUMALOG) injection   SubCUTAneous AC&HS    glucose chewable tablet 16 g  4 Tab Oral PRN    dextrose (D50W) injection syrg 12.5-25 g  12.5-25 g IntraVENous PRN    glucagon (GLUCAGEN) injection 1 mg  1 mg IntraMUSCular PRN    insulin lispro (HUMALOG) injection 5 Units  5 Units SubCUTAneous TIDAC    aspirin chewable tablet 81 mg  81 mg Oral DAILY    insulin glargine (LANTUS) injection 20 Units  20 Units SubCUTAneous DAILY          LAB AND IMAGING FINDINGS:     Lab Results   Component Value Date/Time    WBC 28.1 11/08/2017 04:25 AM    HGB 8.1 11/08/2017 04:25 AM    PLATELET 709 77/30/9049 04:25 AM     Lab Results   Component Value Date/Time    Sodium 133 11/08/2017 04:25 AM    Potassium 4.1 11/08/2017 04:25 AM    Chloride 98 11/08/2017 04:25 AM    CO2 24 11/08/2017 04:25 AM    BUN 54 11/08/2017 04:25 AM    Creatinine 2.11 11/08/2017 04:25 AM    Calcium 8.8 11/08/2017 04:25 AM    Magnesium 2.1 11/08/2017 04:25 AM    Phosphorus 3.8 11/08/2017 04:25 AM      Lab Results   Component Value Date/Time    AST (SGOT) 20 11/06/2017 10:43 AM    Alk. phosphatase 292 11/06/2017 10:43 AM    Protein, total 6.6 11/06/2017 10:43 AM    Albumin 2.2 11/06/2017 10:43 AM    Globulin 4.4 11/06/2017 10:43 AM     Lab Results   Component Value Date/Time    INR 1.6 11/06/2017 10:43 AM    Prothrombin time 16.7 11/06/2017 10:43 AM    aPTT 28.7 08/04/2009 12:35 PM      Lab Results   Component Value Date/Time    Iron 20 10/20/2017 08:17 AM    TIBC 184 10/20/2017 08:17 AM    Iron % saturation 11 10/20/2017 08:17 AM    Ferritin 173 10/20/2017 08:17 AM      No results found for: PH, PCO2, PO2  No components found for: [place]   Lab Results   Component Value Date/Time    CK 45 05/21/2017 09:33 PM    CK - MB <1.0 05/21/2017 09:33 PM                Total time: 70 min  Counseling / coordination time, spent as noted above: 45  > 50% counseling / coordination?: yes    Prolonged service was provided for  []30 min   []75 min in face to face time in the presence of the patient, spent as noted above. Time Start:   Time End:   Note: this can only be billed with 01218 (initial) or 89433 (follow up). If multiple start / stop times, list each separately.

## 2017-11-08 NOTE — PROGRESS NOTES
Hospitalist Progress Note    NAME: Anna Gamez. :  1940   MRN:  [id number]       Assessment / Plan:   Hyponatremia, likely due to vol overload, monitor   CKD 4  Oligouric  - on lasix gtt, outpt improved. Discussed case with Dr Justnie Messer. Note reviewed. Pt is not a candidate for HD given metastatic CA with poor cardiomyopathy with poor EF. -appreciate nephro following, cont' to monitor while on lasix gtt, hope to transition to PO. Acute on chronic systolic heart failure s/p BIV-ICD  Ischemic cardiomyopathy  Persistent Atrial fibrillation  Hypotension   -diuretic discontinued 2 weeks ago due to worsening of renal failure  -cxr showed pulmonary edema with small R pleural effusion, trop neg, probnp 30K  -last Echo showed EF15-20%, repeat Echo EF improved to 40-45%  -cont' dobutamin gtt, lasix gtt as above  -off Eliquis due to hx of GI bleed?, denies any evidence of bleeding currently. GI consulted    -palliative consultation appreciated.      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 Discussed with RN events overnight.       Review of Systems:  Symptom Y/N Comments  Symptom Y/N Comments   Fever/Chills n   Chest Pain n    Poor Appetite    Edema y    Cough    Abdominal Pain     Sputum    Joint Pain     SOB/WILSON y   Pruritis/Rash     Nausea/vomit    Tolerating PT/OT     Diarrhea    Tolerating Diet y    Constipation    Other       Could NOT obtain due to:      Objective:     VITALS:   Last 24hrs VS reviewed since prior progress note. Most recent are:  Patient Vitals for the past 24 hrs:   Temp Pulse Resp BP SpO2   11/08/17 0803 97.7 °F (36.5 °C) 70 23 99/43 96 %   11/08/17 0658 - 70 - 100/52 -   11/08/17 0648 - 70 - (!) 88/37 -   11/08/17 0646 - 70 20 (!) 88/37 97 %   11/08/17 0600 - 70 22 (!) 90/33 96 %   11/08/17 0530 - 71 22 - 96 %   11/08/17 0500 - 70 17 95/42 98 %   11/08/17 0430 - 70 19 (!) 102/39 98 %   11/08/17 0400 98 °F (36.7 °C) 71 16 97/40 98 %   11/08/17 0330 - 70 18 100/44 99 %   11/08/17 0300 - 71 22 102/48 98 %   11/08/17 0230 - 70 27 101/45 96 %   11/08/17 0200 - 70 14 92/50 94 %   11/08/17 0130 - 73 21 (!) 97/38 94 %   11/08/17 0100 - 70 22 96/50 93 %   11/08/17 0030 - 70 22 99/41 -   11/08/17 0000 98 °F (36.7 °C) 70 21 102/46 -   11/07/17 2330 - 70 27 99/40 97 %   11/07/17 2300 - 71 22 102/47 96 %   11/07/17 2200 - 72 23 102/40 96 %   11/07/17 2100 - 70 9 (!) 87/37 97 %   11/07/17 2000 - 70 21 105/43 96 %   11/07/17 1948 - 70 21 (!) 83/27 97 %   11/07/17 1947 98.1 °F (36.7 °C) 70 21 (!) 84/36 97 %   11/07/17 1447 97.3 °F (36.3 °C) 70 16 (!) 98/38 97 %   11/07/17 1438 - - - (!) 83/54 -   11/07/17 1431 - - - (!) 81/54 -   11/07/17 1400 - - - - 96 %   11/07/17 1216 97.4 °F (36.3 °C) 70 17 (!) 94/38 96 %       Intake/Output Summary (Last 24 hours) at 11/08/17 0929  Last data filed at 11/08/17 0700   Gross per 24 hour   Intake           404.81 ml   Output             1180 ml   Net          -775.19 ml        PHYSICAL EXAM:  General: WD, WN. Alert, cooperative, no acute distress    EENT:  EOMI. Anicteric sclerae. MMM  Resp:  CTA bilaterally, no wheezing or rales. No accessory muscle use  CV:  Regular  rhythm,  +2b/l LE edema  GI:  Soft, Non distended, Non tender.  +BS  Neurologic:  Alert and oriented X 3, normal speech  Psych:   Good insight. Not anxious nor agitated  Skin:  No rashes.   No jaundice    Reviewed most current lab test results and cultures  YES  Reviewed most current radiology test results YES  Review and summation of old records today    NO  Reviewed patient's current orders and MAR    YES  PMH/SH reviewed - no change compared to H&P  ________________________________________________________________________  Care Plan discussed with:    Comments   Patient x    Family      RN x    Care Manager     Consultant  x Nephro                     Multidiciplinary team rounds were held today with , nursing, pharmacist and clinical coordinator. Patient's plan of care was discussed; medications were reviewed and discharge planning was addressed. ________________________________________________________________________  Total NON critical care TIME:  35   Minutes    Total CRITICAL CARE TIME Spent:   Minutes non procedure based      Comments   >50% of visit spent in counseling and coordination of care     ________________________________________________________________________  Jay Jay Slater MD     Procedures: see electronic medical records for all procedures/Xrays and details which were not copied into this note but were reviewed prior to creation of Plan. LABS:  I reviewed today's most current labs and imaging studies.   Pertinent labs include:  Recent Labs      11/08/17   0425  11/07/17   0253  11/06/17   1043   WBC  28.1*  29.6*  27.3*   HGB  8.1*  8.1*  9.2*   HCT  24.5*  25.8*  30.5*   PLT  296  321  322     Recent Labs      11/08/17   0425  11/07/17   0253  11/06/17   1043   NA  133*  129*  129*   K  4.1  5.0  4.3   CL  98  97  93*   CO2  24  23  28   GLU  125*  178*  358*   BUN  54*  56*  52*   CREA  2.11*  2.22*  2.31*   CA  8.8  8.7  8.9   MG  2.1  2.1  2.3   PHOS  3.8   --    --    ALB   --    --   2.2*   TBILI   --    --   2.7*   SGOT   --    --   20   ALT   --    --   12   INR   --    --   1.6*       Signed: Jay Jay Slater MD

## 2017-11-08 NOTE — PROGRESS NOTES
NAME: Elise Jenkins. :  1940        MRN:  [id number]        Assessment :    Plan:  --BARBARA  Anasarca  Hyponatremia  DM 2  Hypoalbuminemia  Anemia  Hypotension  Metastatic RCCa - worsened  Acute on chronic systolic HF (EF 71-23%) --Creatinine stable (2.2 to 2.1) (a fair amount of fluctuation - as high as 3.2 on 10/27); ~ 500 mg proteinuria; bernard     hgb 8.1     Albumin 2.2; sodium better (129 to 133)     Weight is trending down (231 #'s on admission, now 216 #'s)     On Dobutamine; A bit improved UOP with Lasix gtt (continue on 10 mg/day)     BP is marginal, but holding with lasix gtt (SBP 90 to 100)     I do not think Mr. Aggie Coffey is a HD candidate b/c of his very poor EF and metastatic RCC    Holding flomax while bernard is in place (continue bernard for accurate I&O and LOPEZ symptoms on admission).     Will follow closely; if fails to continue to diurese with Lasix gtt or if poorly tolerates b/c low BP then prognosis is very poor and again, I see no role for HD/UF in Mr. Reveles Lowers:     Chief Complaint:  Sob a bit better. Edema a bit better. We discussed the above. Review of Systems:    Symptom Y/N Comments  Symptom Y/N Comments   Fever/Chills    Chest Pain n    Poor Appetite y   Edema y    Cough    Abdominal Pain     Sputum    Joint Pain     SOB/WILSON y   Pruritis/Rash     Nausea/vomit n   Tolerating PT/OT     Diarrhea    Tolerating Diet     Constipation    Other       Could not obtain due to:      Objective:     VITALS:   Last 24hrs VS reviewed since prior progress note.  Most recent are:  Visit Vitals    BP 91/43 (BP 1 Location: Left arm, BP Patient Position: At rest)    Pulse 73    Temp 97.5 °F (36.4 °C)    Resp 17    Ht 5' 9\" (1.753 m)    Wt 98 kg (216 lb 0.8 oz)    SpO2 95%    BMI 31.91 kg/m2       Intake/Output Summary (Last 24 hours) at 17 0956  Last data filed at 17 0700   Gross per 24 hour   Intake           404.81 ml   Output             1180 ml   Net          -775.19 ml      Telemetry Reviewed:     PHYSICAL EXAM:  General: Ill appearing elderly gentleman, nad  Resp:  Rales. No access. muscle use  CV:  Regular  rhythm,  No murmur (), No Rubs, No Gallops. +++ edema  GI:  Soft,  distended, Non tender.  +Bowel sounds, no HSM      Lab Data Reviewed: (see below)    Medications Reviewed: (see below)    PMH/SH reviewed - no change compared to H&P  ________________________________________________________________________  Care Plan discussed with:  Patient y    Family      RN     Care Manager                    Consultant:          Comments   >50% of visit spent in counseling and coordination of care       ________________________________________________________________________  Melissa Vidales MD     Procedures: see electronic medical records for all procedures/Xrays and details which  were not copied into this note but were reviewed prior to creation of Plan. LABS:  Recent Labs      11/08/17 0425 11/07/17   0253   WBC  28.1*  29.6*   HGB  8.1*  8.1*   HCT  24.5*  25.8*   PLT  296  321     Recent Labs      11/08/17   0425  11/07/17   0253  11/06/17   1043   NA  133*  129*  129*   K  4.1  5.0  4.3   CL  98  97  93*   CO2  24  23  28   BUN  54*  56*  52*   CREA  2.11*  2.22*  2.31*   GLU  125*  178*  358*   CA  8.8  8.7  8.9   MG  2.1  2.1  2.3   PHOS  3.8   --    --      Recent Labs      11/06/17   1043   SGOT  20   AP  292*   TP  6.6   ALB  2.2*   GLOB  4.4*     Recent Labs      11/06/17   1043   INR  1.6*   PTP  16.7*      No results for input(s): FE, TIBC, PSAT, FERR in the last 72 hours. No results found for: FOL, RBCF   No results for input(s): PH, PCO2, PO2 in the last 72 hours. No results for input(s): CPK, CKMB in the last 72 hours.     No lab exists for component: TROPONINI  No components found for: [place]  Lab Results   Component Value Date/Time    Color DARK YELLOW 11/07/2017 02:53 AM    Appearance CLOUDY 11/07/2017 02:53 AM    Specific gravity 1.016 11/07/2017 02:53 AM    pH (UA) 5.5 11/07/2017 02:53 AM    Protein TRACE 11/07/2017 02:53 AM    Glucose NEGATIVE  11/07/2017 02:53 AM    Ketone NEGATIVE  11/07/2017 02:53 AM    Bilirubin NEGATIVE  05/21/2017 10:35 PM    Urobilinogen 1.0 11/07/2017 02:53 AM    Nitrites NEGATIVE  11/07/2017 02:53 AM    Leukocyte Esterase MODERATE 11/07/2017 02:53 AM    Epithelial cells FEW 11/07/2017 02:53 AM    Bacteria NEGATIVE  11/07/2017 02:53 AM    WBC 5-10 11/07/2017 02:53 AM    RBC 5-10 11/07/2017 02:53 AM       MEDICATIONS:  Current Facility-Administered Medications   Medication Dose Route Frequency    furosemide (LASIX) 10 mg/ml infusion  10 mg/hr IntraVENous CONTINUOUS    sodium chloride (NS) flush 5-10 mL  5-10 mL IntraVENous Q8H    sodium chloride (NS) flush 5-10 mL  5-10 mL IntraVENous PRN    acetaminophen (TYLENOL) tablet 650 mg  650 mg Oral Q4H PRN    ondansetron (ZOFRAN) injection 4 mg  4 mg IntraVENous Q4H PRN    bisacodyl (DULCOLAX) suppository 10 mg  10 mg Rectal DAILY PRN    amiodarone (CORDARONE) tablet 200 mg  200 mg Oral DAILY    DOBUTamine (DOBUTREX) 1,000 mg/250 mL (4,000 mcg/mL) infusion  5 mcg/kg/min IntraVENous TITRATE    insulin lispro (HUMALOG) injection   SubCUTAneous AC&HS    glucose chewable tablet 16 g  4 Tab Oral PRN    dextrose (D50W) injection syrg 12.5-25 g  12.5-25 g IntraVENous PRN    glucagon (GLUCAGEN) injection 1 mg  1 mg IntraMUSCular PRN    insulin lispro (HUMALOG) injection 5 Units  5 Units SubCUTAneous TIDAC    aspirin chewable tablet 81 mg  81 mg Oral DAILY    insulin glargine (LANTUS) injection 20 Units  20 Units SubCUTAneous DAILY

## 2017-11-08 NOTE — CARDIO/PULMONARY
C/P Rehab Note:    Chart reviewed and pt visited. Pt 79 yo admitting diagnoses: acute on chronic systolic HF, s/p BIV-ICD, ICM, a-fib, EF 15-20%, renal CA with mets, former tobacco use. This was a follow-up visit to answer questions and reinforce prior teaching re: CHF, S&Ss, medication management, Low NA diet, daily weights, when to call the doctor and balancing rest/activity. Met with patient and his sister who he lives with. Patient states he does weigh daily and knows to report weight gain to MD if increases 2-3 lb in 24 hr period. His sister tries to also provide low sodium choices when preparing food. Will continue to follow.

## 2017-11-08 NOTE — ACP (ADVANCE CARE PLANNING)
ACP note    Patient desires natural dying when that time comes. He signed DDNR for himself today. He wants his brother, [facility] as [place] and will complete this form later today.

## 2017-11-08 NOTE — PROGRESS NOTES
GI note:    Off the floor currently. I will follow up on him later. Case d/w Dr Cherie Michaels yesterday. Mubashir A. Dayton Mortimer, MD

## 2017-11-08 NOTE — PROGRESS NOTES
CM Initial Assessment        PMHX--  Significant for htn, hld, dm, chf, cad, atrial fib, cabg, pacer and kidney cancer. Current admission assessment--Patient came into ed with chief complaint of increased leg swelling to bl lower and upper extremities and facial swelling. Patient states he was able to feed himself prior to coming into the hospital. He states his nephew also would help him with errands and doing things for him. Patient lives in one story home with his sister. His sister is in the room with him at this time and he states it is fine to talk with her. Per sister states she works during the day and when she is at work her son is with him. He is not on oxygen at home and he has never used home health or snf in the past. Patient states he managed his own finances when he was home. Patient states it is fine to talk with his brother. Patient states prior to coming to the hospital Geoffrey Granados was going to set up a meeting time because they were looking to get personal care aides for patient however he was admitted on 11/6/17. His brother did confirm this. The plan is to go home with home health and to see if they can get approval for personal care aides. List of home health agencies left in room for patient, brother and sister to review. They are aware the list is in the room and will review and let me know. .        Care Management Interventions  PCP Verified by CM: Yes  Discharge Durable Medical Equipment:  (Patient has walker at home and cane. )  Physical Therapy Consult: Yes  Occupational Therapy Consult: Yes  Current Support Network:  Other (Lives with his sister. )  Confirm Follow Up Transport: Family  Plan discussed with Pt/Family/Caregiver: Yes  Discharge Location  Discharge Placement: Home                Christel RNBSNCRM    EXT [id number]

## 2017-11-08 NOTE — PROGRESS NOTES
Problem: Mobility Impaired (Adult and Pediatric)  Goal: *Acute Goals and Plan of Care (Insert Text)  Physical Therapy Goals  Initiated 11/7/2017  1. Patient will move from supine to sit and sit to supine  and roll side to side in bed with independence within 7 day(s). 2.  Patient will transfer from bed to chair and chair to bed with independence using the least restrictive device within 7 day(s). 3.  Patient will perform sit to stand with independence within 7 day(s). 4.  Patient will ambulate with modified independence for 100 feet with the least restrictive device within 7 day(s). 5.  Patient will ascend/descend 3 stairs with 2 handrail(s) with modified independence within 7 day(s). physical Therapy TREATMENT  Patient: Yon Boggs (71 y.o. male)  Date: 11/8/2017  Diagnosis: Heart failure (HCC) <principal problem not specified>       Precautions: Fall, Skin    ASSESSMENT:  Pt progressing well towards PT goals. Received clarification that pt DOES NOT have a wheelchair at home but a chair with wheels on it(computer chair), that he spends the majority of the day in as it is the easiest to stand up from. Pt required Mod A x 2 to stand from the bed in the lowest position and Min A x 1 from the commode. Ambulated increased distance in room with RW and CGA A x 2. Fair balance with RW demonstrated today, requiring verbal cues to stay within RW and slow gait speed. Assisted into bathroom with CGA and then Mod A for bowel hygiene. Returned to recliner at end of session with LE elevated due to swelling and low BP. Pt has been limited by low BPs and urgent BMs with bed mobility. Pt will have support from family at home and will benefit from HHPT follow up for continued strengthening and activity.      BP readings:  Supine 103/42  Sitting 83/48  Post-Activity Sitting 99/36  Sitting with Legs Elevated 100/40    Progression toward goals:  []    Improving appropriately and progressing toward goals  [x] Improving slowly and progressing toward goals  []    Not making progress toward goals and plan of care will be adjusted     PLAN:  Patient continues to benefit from skilled intervention to address the above impairments. Continue treatment per established plan of care. Discharge Recommendations:  Home Health  Further Equipment Recommendations for Discharge:  None at this time     SUBJECTIVE:   Patient stated The chair has wheels on it but I don;t know where the wheelchair came from.     OBJECTIVE DATA SUMMARY:   Critical Behavior:  Neurologic State: Alert, Drowsy  Orientation Level: Oriented X4  Cognition: Follows commands  Safety/Judgement: Decreased awareness of need for assistance, Decreased awareness of need for safety, Fall prevention, Insight into deficits  Functional Mobility Training:  Bed Mobility:     Supine to Sit: Minimum assistance; Additional time;Assist x1              Transfers:  Sit to Stand: Moderate assistance;Assist x2; Additional time;Minimum assistance (MOd A from bed(lowest position), Min-commode)  Stand to Sit: Moderate assistance                             Balance:  Sitting: Intact  Sitting - Static: Good (unsupported)  Sitting - Dynamic: Good (unsupported)  Standing: Impaired  Standing - Static: Fair  Standing - Dynamic : Fair  Ambulation/Gait Training:  Distance (ft): 45 Feet (ft)  Assistive Device: Gait belt;Walker, rolling  Ambulation - Level of Assistance: Contact guard assistance;Assist x2; Additional time        Gait Abnormalities: Decreased step clearance; Path deviations        Base of Support: Widened     Speed/Paulina: Pace decreased (<100 feet/min)  Step Length: Right shortened;Left shortened                    Activity Tolerance:   Good  Please refer to the flowsheet for vital signs taken during this treatment.   After treatment:   [x]    Patient left in no apparent distress sitting up in chair  []    Patient left in no apparent distress in bed  [x]    Call bell left within reach  [x]    Nursing notified  [x]    Caregiver present  []    Bed alarm activated    COMMUNICATION/COLLABORATION:   The patients plan of care was discussed with: Registered Nurse    Roula Starr, PT   Time Calculation: 32 mins

## 2017-11-08 NOTE — PROGRESS NOTES
PCU SHIFT NURSING NOTE      Bedside and Verbal shift change report given to Tammy Persaud (oncoming nurse) by Shamar Ryan RN (offgoing nurse). Report included the following information SBAR, Intake/Output, MAR, Recent Results, Med Rec Status and Cardiac Rhythm paced. Shift Summary:   0800: Pt pulled up in bed and set up for breakfast, denies pain or any other needs. 0830: Pt taken to echo  0930: Pt back in room  1200: SCDs placed, pt educated, verbalized understanding. 1700: Pt assisted back into bed from chair, SCDs on. Admission Date 11/6/2017   Admission Diagnosis Heart failure ([facility] [address]   Consults IP CONSULT TO CARDIOLOGY  IP CONSULT TO HEMATOLOGY  IP CONSULT TO PALLIATIVE CARE - PROVIDER  IP CONSULT TO GASTROENTEROLOGY  IP CONSULT TO UROLOGY  IP CONSULT TO NEPHROLOGY        Consults   []PT   []OT   []Speech   []Case Management      [] Palliative      Cardiac Monitoring Order   []Yes   []No     IV drips   []Yes    Drip:                            Dose:  Drip:                            Dose:  Drip:                            Dose:   []No     GI Prophylaxis   []Yes   []No         DVT Prophylaxis   SCDs:  Sequential Compression Device: Bilateral     Patient Refused VTE Prophylaxis: Yes    Maycol stockings:         [] Medication   []Contraindicated   []None      Activity Level Activity Level: Bed Rest     Activity Assistance: Partial (two people)   Purposeful Rounding every 1-2 hour? []Yes   Abernathy Score  Total Score: 3   Bed Alarm (If score 3 or >)   []Yes   [] Refused (See signed refusal form in chart)   Panfilo Score  Panfilo Score: 17   Panfilo Score (if score 14 or less)   []PMT consult   []Wound Care consult      []Specialty bed   [] Nutrition consult          Needs prior to discharge:   Home O2 required:    []Yes   []No    If yes, how much O2 required?     Other:    Last Bowel Movement: Last Bowel Movement Date: 11/04/17      Influenza Vaccine          Pneumonia Vaccine           Diet Active Orders   Diet    DIET DIABETIC CONSISTENT CARB Regular; 2 GM NA (House Low NA); FR 1500ML      LDAs               Peripheral IV 11/06/17 Right Antecubital (Active)   Site Assessment Clean, dry, & intact 11/8/2017  7:01 AM   Phlebitis Assessment 0 11/8/2017  7:01 AM   Infiltration Assessment 0 11/8/2017  7:01 AM   Dressing Status Clean 11/8/2017  7:01 AM   Dressing Type Transparent 11/8/2017  7:01 AM   Hub Color/Line Status Pink 11/8/2017  7:01 AM   Action Taken Open ports on tubing capped 11/8/2017 12:00 AM   Alcohol Cap Used Yes 11/8/2017 12:00 AM       Peripheral IV 11/07/17 Right (Active)   Site Assessment Clean, dry, & intact 11/8/2017  7:01 AM   Phlebitis Assessment 0 11/8/2017  7:01 AM   Infiltration Assessment 0 11/8/2017  7:01 AM   Dressing Status Clean, dry, & intact 11/8/2017  7:01 AM   Dressing Type Transparent 11/8/2017  7:01 AM   Hub Color/Line Status Pink 11/8/2017  7:01 AM   Action Taken Open ports on tubing capped 11/7/2017  7:49 PM   Alcohol Cap Used Yes 11/8/2017 12:00 AM                      Urinary Catheter Urinary Catheter 11/06/17 Coude-Criteria for Appropriate Use: Strict I/Os    Intake & Output   Date 11/07/17 0700 - 11/08/17 0659 11/08/17 0700 - 11/09/17 0659   Shift 0373-8169 4287-8901 24 Hour Total 0700-1859 1900-0659 24 Hour Total   I  N  T  A  K  E   P.O. 240 120 360         P. O. 240 120 360       I.V.  (mL/kg/hr)  280.4  (0.2) 280.4  (0.1) 4.4  4.4      Lasix Volume  12.8 12.8 0.3  0.3      DOBUTamine Volume  267.6 267.6 4.1  4.1    Shift Total  (mL/kg) 240  (2.4) 400.4  (4.1) 640.4  (6.5) 4.4  (0)  4.4  (0)   O  U  T  P  U  T   Urine  (mL/kg/hr) 350  (0.3) 830  (0.7) 1180  (0.5)         Urine Output (mL) (Urinary Catheter 11/06/17 Coude)        Stool            Stool Occurrence(s) 1 x 2 x 3 x       Shift Total  (mL/kg) 350  (3.5) 830  (8.5) 1180  (12)      NET -110 -429.6 -539.6 4.4  4.4   Weight (kg) 101.2 98 98 98 98 98         Readmission Risk Assessment Tool Score High Risk            26 Total Score        3 Has Seen PCP in Last 6 Months (Yes=3, No=0)    5 Pt. Coverage (Medicare=5 , Medicaid, or Self-Pay=4)    18 Charlson Comorbidity Score (Age + Comorbid Conditions)        Criteria that do not apply:    . Living with Significant Other. Assisted Living. LTAC. SNF.  or   Rehab    Patient Length of Stay (>5 days = 3)    IP Visits Last 12 Months (1-3=4, 4=9, >4=11)       Expected Length of Stay 4d 12h   Actual Length of Stay 2

## 2017-11-09 NOTE — PROGRESS NOTES
Spiritual Care Assessment/Progress Notes    Tmoeka Vargas [id number]  [id number]    1940  78 y.o.  male    Patient Telephone Number: [phone] (home)   Church Affiliation: [employer]   Language: English   Extended Emergency Contact Information  Primary Emergency Contact: Martir Granados Phone: [phone]  Mobile Phone: [phone]  Relation: Sister  Secondary Emergency Contact: [address] Phone: [phone]  Relation: Brother   Patient Active Problem List    Diagnosis Date Noted    Heart failure ([address] 11/06/2017    Metastatic cancer ([facility] [address] 11/06/2017    CKD (chronic kidney disease) 11/06/2017    Hyponatremia 11/06/2017    Presence of biventricular automatic cardioverter/defibrillator (AICD) 05/02/2016    S/P ablation of atrial fibrillation 01/08/2016    SOBOE (shortness of breath on exertion) 01/06/2016    A-fib ([facility] [address] 12/17/2015    Atrial flutter ([facility] [address] 12/17/2015    WILSON (dyspnea on exertion) 04/21/2015    Chronic systolic heart failure ([facility] [address] 04/27/2012    Postsurgical aortocoronary bypass status 04/27/2012    Essential hypertension, benign 04/27/2012    DM (diabetes mellitus) ([facility] [address] 04/27/2012    Mixed hyperlipidemia 04/27/2012    Coronary atherosclerosis of native coronary artery 04/27/2012        Date: 11/9/2017       Level of Church/Spiritual Activity:  [x]         Involved in aristides tradition/spiritual practice    []         Not involved in aristides tradition/spiritual practice  [x]         Spiritually oriented    []         Claims no spiritual orientation    []         seeking spiritual identity  []         Feels alienated from Pentecostal practice/tradition  []         Feels angry about Pentecostal practice/tradition  [x]         Spirituality/Pentecostal tradition is a resource for coping at this time.   []         Not able to assess due to medical condition    Services Provided Today:  []         crisis intervention    []         reading Scriptures  [x]         spiritual assessment    [x]         prayer  [x]         empathic listening/emotional support  []         rites and rituals (cite in comments)  []         life review     []         Sikh support  []         theological development   []         advocacy  []         ethical dialog     [x]         blessing  []         bereavement support    []         support to family  []         anticipatory grief support   []         help with AMD  []         spiritual guidance    []         meditation      Spiritual Care Needs  [x]         Emotional Support  [x]         Spiritual/Jewish Care  []         Loss/Adjustment  []         Advocacy/Referral                /Ethics  []         No needs expressed at               this time  []         Other: (note in               comments)  Spiritual Care Plan  []         Follow up visits with               pt/family  []         Provide materials  []         Schedule sacraments  []         Contact Community               Clergy  [x]         Follow up as needed  []         Other: (note in               comments)     Comments:   Responded to length of stay and status as Palliative consult. Mr. Linh Obiren was sitting up in chair sleeping, but roused as  entered the room. Provided active listening as pt shared that he felt he was coming to the end of his life. Pt shared that he has cancer and that he has a heart condition and does not expect a good outcome from all of this. However, he spoke about his siblings not being ready for him to go. Pt shared that he has 3 sisters and 2 brothers. One brother is a deacon at his [employer]. He has lived with one sister, but will need to move to other sister's house because he can no longer navigate stairs. Pt shared that he is okay with going to be with the Rufus Cano, \"I am a true believer. \"  Pt expressed more concern about his siblings.    continued to facilitate the processing of these feelings about his own \"demise\" as the pt put it. Pt shared about his experience of being very nauseous and miserable.  encouraged pt to talk to his doctor. Just at this time, Dr. Belén Morton, stepped in the room.  invited Dr. Belén Morton to answer some questions about treatment. Dr. Belén Morton patiently explained how some of the medications work -- educating pt and this . Then, Dr. Belén Morton, was able to address troublesome issues for the pt and pt was greatly relieved and much more hopeful. Pt had a genuine smile on his face. We decided this was one of the many blessings that God puts into our day. Pt actually gave a chuckle.  will follow up as able/needed. For Spiritual Care paging please call [phone]). Angelo Moore M.Div.   Palliative  Fellow

## 2017-11-09 NOTE — ACP (ADVANCE CARE PLANNING)
Please refer to Narciso Bee MD's note on 11/8/2017 for goals of care discussion including future options and Pt's current medical treatment plan. Pt completed a DDNR order with Dr. Sheridan Trejo on 11/8/17. LCSW will visit Pt today to complete AMD for appointment of health care agent.

## 2017-11-09 NOTE — PROGRESS NOTES
Problem: Falls - Risk of  Goal: *Absence of Falls  Document Dianna Fall Risk and appropriate interventions in the flowsheet.    Outcome: Progressing Towards Goal  Fall Risk Interventions:  Mobility Interventions: Bed/chair exit alarm, Mechanical lift, Patient to call before getting OOB, PT Consult for mobility concerns         Medication Interventions: Assess postural VS orthostatic hypotension, Bed/chair exit alarm, Patient to call before getting OOB    Elimination Interventions: Bed/chair exit alarm, Call light in reach, Patient to call for help with toileting needs

## 2017-11-09 NOTE — PROGRESS NOTES
Problem: Mobility Impaired (Adult and Pediatric)  Goal: *Acute Goals and Plan of Care (Insert Text)  Physical Therapy Goals  Initiated 11/7/2017  1. Patient will move from supine to sit and sit to supine  and roll side to side in bed with independence within 7 day(s). 2.  Patient will transfer from bed to chair and chair to bed with independence using the least restrictive device within 7 day(s). 3.  Patient will perform sit to stand with independence within 7 day(s). 4.  Patient will ambulate with modified independence for 100 feet with the least restrictive device within 7 day(s). 5.  Patient will ascend/descend 3 stairs with 2 handrail(s) with modified independence within 7 day(s). physical Therapy TREATMENT  Patient: Terrance Roper (71 y.o. male)  Date: 11/9/2017  Diagnosis: Heart failure (HCC) <principal problem not specified>       Precautions: Fall, Skin    ASSESSMENT:  Pt received sitting in bedside chair and agreeable to therapy intervention. Pt cleared by nursing for mobility. Safe functional mobility continues to be limited by patient's decreased activity tolerance and decline in BP. Pt participated in seated exercises prior to gait training as described below with instruction. Sit<>stand transfers performed with min-mod A x 2, requiring VCs to shift forward and push up with BUEs. Pt stood in static stance x 2 minutes with CGA and RW support. He was able to ambulate 30' with CGA x 1 using RW, however requires constant cueing for safe use of RW during gait as pt pushing RW too far forward, resulting in increased trunk flexion. He demonstrates limited activity tolerance and endurance overall as he is very dyspneic with all activity and requires frequent rest breaks. Pt was assisted into bedside chair following therapy session in reclined position to improve BP. Pt without complaints of lightheadedness/dizziness during activity, however BP trending down.  He was left with all needs met and RN aware. Recommend pt discharge home with 24/7 support, use of RW, and HHPT pending progress in acute PT. Progression toward goals:  []    Improving appropriately and progressing toward goals  [x]    Improving slowly and progressing toward goals  []    Not making progress toward goals and plan of care will be adjusted     PLAN:  Patient continues to benefit from skilled intervention to address the above impairments. Continue treatment per established plan of care. Discharge Recommendations:  Home Health with 24/7 supervision, To Be Determined  Further Equipment Recommendations for Discharge:  rolling walker     SUBJECTIVE:   Patient stated I just feel weak.     OBJECTIVE DATA SUMMARY:   Critical Behavior:  Neurologic State: Alert  Orientation Level: Oriented X4  Cognition: Follows commands  Safety/Judgement: Decreased awareness of need for assistance, Decreased awareness of need for safety, Fall prevention, Insight into deficits  Functional Mobility Training:  Bed Mobility:           Scooting: Supervision        Transfers:  Sit to Stand: Minimum assistance; Moderate assistance;Assist x2; Additional time (from bedside chair)  Stand to Sit: Minimum assistance                             Balance:  Sitting: Intact  Sitting - Static: Good (unsupported)  Sitting - Dynamic: Good (unsupported)  Standing: Impaired; With support  Standing - Static: Good;Constant support  Standing - Dynamic : Fair  Ambulation/Gait Training:  Distance (ft): 30 Feet (ft)  Assistive Device: Gait belt;Walker, rolling  Ambulation - Level of Assistance: Assist x1;Additional time; Adaptive equipment;Contact guard assistance        Gait Abnormalities: Decreased step clearance;Shuffling gait        Base of Support: Widened     Speed/Paulina: Pace decreased (<100 feet/min); Shuffled  Step Length: Left shortened;Right shortened       Therapeutic Exercises:   Seated LAQ 5x BLE  Seated hip flexion 5x BLE  Seated ankle pumps 10x BLE  Standing hip flexion 5x, 2 sets with CGA and RW support  Pain:  Pain Scale 1: Numeric (0 - 10)  Pain Intensity 1: 0  Pain Location 1: Back        Pain Intervention(s) 1: Medication (see MAR)  Activity Tolerance:   Fair/poor - decline in BP with all standing activities (improved some with activity); SaO2 stable throughout, however pt very WILSON  Please refer to the flowsheet for vital signs taken during this treatment.   After treatment:   [x]    Patient left in no apparent distress sitting up in chair  []    Patient left in no apparent distress in bed  [x]    Call bell left within reach  [x]    Nursing notified  []    Caregiver present  []    Bed alarm activated    COMMUNICATION/COLLABORATION:   The patients plan of care was discussed with: Physical Therapist and Registered Nurse    Brigitte Tavera PT, DPT   Time Calculation: 23 mins

## 2017-11-09 NOTE — PROGRESS NOTES
Hematology Oncology Progress Note         Follow up for: metastatic RCC     Chart notes reviewed since last visit. Case discussed with following: patient.  at bedside. Patient complains of the following: not doing as well today- has developed nausea and notices that when he urinates, he has a bit of diarrhea. Very depressing! Additional concerns noted by the staff:     Patient Vitals for the past 24 hrs:   BP Temp Pulse Resp SpO2 Weight   11/09/17 1058 (!) 86/40 97.6 °F (36.4 °C) 70 19 95 % -   11/09/17 0757 (!) 102/36 97.8 °F (36.6 °C) 72 20 93 % -   11/09/17 0525 - - - - - 98.2 kg (216 lb 9.6 oz)   11/09/17 0412 104/41 98.4 °F (36.9 °C) 74 18 96 % -   11/09/17 0312 93/40 - - - - -   11/09/17 0306 (!) 92/36 - - - - -   11/09/17 0003 101/42 98.4 °F (36.9 °C) 73 20 96 % -   11/08/17 2229 - - - - 98 % -   11/08/17 2022 107/42 98.4 °F (36.9 °C) 70 20 98 % -     ROS negative for 11 organ systems except as noted. Physical Examination:  Constitutional Alert, cooperative, oriented. Mood and affect appropriate. Appears close to chronological age. Well nourished. Well developed. Head Normocephalic; no scars   Eyes Conjunctivae and sclerae are clear and without icterus. Pupils are reactive and equal.   ENMT Sinuses are nontender. No oral exudates, ulcers, masses, thrush or mucositis. Oropharynx clear. Tongue normal.   Neck Supple without masses or thyromegaly. No jugular venous distension. Hematologic/Lymphatic No petechiae or purpura. No tender or palpable lymph nodes noted   Respiratory Lungs are clear to auscultation   Cardiovascular Regular rate and rhythm of heart without murmurs, gallops or rubs. Chest / Line Site Chest is symmetric with no chest wall deformities. Abdomen Non-tender, non-distended, no masses, ascites or hepatosplenomegaly. Good bowel sounds. No guarding or rebound tenderness. Musculoskeletal No tenderness or swelling, normal range of motion without obvious weakness. Extremities No visible deformities, no cyanosis, clubbing or edema. Skin No rashes, scars, or lesions suggestive of malignancy. No petechiae, purpura, or ecchymoses. No excoriations. Neurologic No sensory or motor deficits noted but not specifically tested. Sitting up in wheelchair comfortably. Psychiatric Alert and oriented. Coherent speech. Verbalizes understanding of our discussions today.        Labs:  Recent Results (from the past 24 hour(s))   GLUCOSE, POC    Collection Time: 11/08/17 10:09 PM   Result Value Ref Range    Glucose (POC) 93 65 - 100 mg/dL    Performed by [employer]*    METABOLIC PANEL, BASIC    Collection Time: 11/09/17  3:24 AM   Result Value Ref Range    Sodium 133 (L) 136 - 145 mmol/L    Potassium 4.1 3.5 - 5.1 mmol/L    Chloride 98 97 - 108 mmol/L    CO2 27 21 - 32 mmol/L    Anion gap 8 5 - 15 mmol/L    Glucose 68 65 - 100 mg/dL    BUN 54 (H) 6 - 20 MG/DL    Creatinine 2.02 (H) 0.70 - 1.30 MG/DL    BUN/Creatinine ratio 27 (H) 12 - 20      GFR est AA 39 (L) >60 ml/min/1.73m2    GFR est non-AA 32 (L) >60 ml/min/1.73m2    Calcium 8.2 (L) 8.5 - 10.1 MG/DL   MAGNESIUM    Collection Time: 11/09/17  3:24 AM   Result Value Ref Range    Magnesium 2.1 1.6 - 2.4 mg/dL   CBC W/O DIFF    Collection Time: 11/09/17  3:24 AM   Result Value Ref Range    WBC 25.9 (H) 4.1 - 11.1 K/uL    RBC 3.27 (L) 4.10 - 5.70 M/uL    HGB 8.1 (L) 12.1 - 17.0 g/dL    HCT 25.6 (L) 36.6 - 50.3 %    MCV 78.3 (L) 80.0 - 99.0 FL    MCH 24.8 (L) 26.0 - 34.0 PG    MCHC 31.6 30.0 - 36.5 g/dL    RDW 17.7 (H) 11.5 - 14.5 %    PLATELET 302 775 - 489 K/uL   GLUCOSE, POC    Collection Time: 11/09/17  8:05 AM   Result Value Ref Range    Glucose (POC) 82 65 - 100 mg/dL    Performed by Rockford Simmonds    GLUCOSE, POC    Collection Time: 11/09/17 11:04 AM   Result Value Ref Range    Glucose (POC) 95 65 - 100 mg/dL    Performed by Felicita Centeno          Assessment and Plan:   Metastatic RCC - had evidence of progressive disease on last scan. Dr. Braulio Gavin was trying to get cabozantinib approved for him. The opdivo (nivolumab) is being stopped for lack of significant efficacy. Have read earlier notes and appreciate conundrum of his poor cardiac status and overall poor prognosis relative to his progressive cancer. We can certainly refer to hospice as outpatient if in fact, he improves enough to be discharged and get to the office reliably to be monitored on a new oral molecularly targeted therapy. Acute on chronic systolic heart failure - on dobutamine    Has BIV-ICD    Type 2 DM    Anemia with prior GI bleed. Off heparin for now given drop in hgb. Nausea - will add compazine to regimen.  May need to increase zofran from 4 mg to 8 mg to be a bit more efficacious    Diarrhea - try immodium

## 2017-11-09 NOTE — PROGRESS NOTES
NAME: Lee Kohler. :  1940        MRN:  [id number]        Assessment :    Plan:  --BARBARA  Anasarca  Hyponatremia  DM 2  Hypoalbuminemia  Anemia  Hypotension  Metastatic RCCa - worsened  Acute on chronic systolic HF (EF 25-84%) --Creatinine a bit better (2.2 to 2.1 to 2) (a fair amount of fluctuation - as high as 3.2 on 10/27); ~ 500 mg proteinuria; bernard     hgb 8.1     Albumin 2.2; sodium better, stable (now 133)     Weight is trending down (231 #'s on admission, now 216 #'s). Edema is better     On Dobutamine; A bit improved UOP with Lasix gtt (continue on 10 mg/day) - continue for now     BP is marginal, but holding with lasix gtt (SBP 90 to 100)     I do not think Mr. Joseph Mott is a HD candidate b/c of his very poor EF and metastatic RCC    Holding flomax while bernard is in place (continue bernard for accurate I&O and LOPEZ symptoms on admission).     Will follow closely; if fails to continue to diurese with Lasix gtt or if poorly tolerates b/c low BP then prognosis is very poor and again, I see no role for HD/UF in Mr. Alicia Victoria:     Chief Complaint:  oob in chair. Sob a bit better. Edema a bit better. We discussed the above. Review of Systems:    Symptom Y/N Comments  Symptom Y/N Comments   Fever/Chills    Chest Pain n    Poor Appetite y   Edema y    Cough    Abdominal Pain     Sputum    Joint Pain     SOB/WILSON y   Pruritis/Rash     Nausea/vomit n   Tolerating PT/OT     Diarrhea    Tolerating Diet     Constipation    Other       Could not obtain due to:      Objective:     VITALS:   Last 24hrs VS reviewed since prior progress note.  Most recent are:  Visit Vitals    /41    Pulse 74    Temp 98.4 °F (36.9 °C)    Resp 18    Ht 5' 9\" (1.753 m)    Wt 98.2 kg (216 lb 9.6 oz)    SpO2 96%    BMI 31.99 kg/m2       Intake/Output Summary (Last 24 hours) at 17 0658  Last data filed at 17 4479   Gross per 24 hour   Intake              4.4 ml   Output             1600 ml   Net          -1595.6 ml      Telemetry Reviewed:     PHYSICAL EXAM:  General: Ill appearing elderly gentleman, nad  Resp:  Rales. No access. muscle use  CV:  Regular  rhythm,  No murmur (), No Rubs, No Gallops. +++ edema  GI:  Soft,  distended, Non tender.  +Bowel sounds, no HSM      Lab Data Reviewed: (see below)    Medications Reviewed: (see below)    PMH/SH reviewed - no change compared to H&P  ________________________________________________________________________  Care Plan discussed with:  Patient y    Family      RN     Care Manager                    Consultant:          Comments   >50% of visit spent in counseling and coordination of care       ________________________________________________________________________  Carl Douglass MD     Procedures: see electronic medical records for all procedures/Xrays and details which  were not copied into this note but were reviewed prior to creation of Plan. LABS:  Recent Labs      11/09/17   0324  11/08/17   0425   WBC  25.9*  28.1*   HGB  8.1*  8.1*   HCT  25.6*  24.5*   PLT  317  296     Recent Labs      11/09/17   0324  11/08/17   0425  11/07/17   0253   NA  133*  133*  129*   K  4.1  4.1  5.0   CL  98  98  97   CO2  27  24  23   BUN  54*  54*  56*   CREA  2.02*  2.11*  2.22*   GLU  68  125*  178*   CA  8.2*  8.8  8.7   MG  2.1  2.1  2.1   PHOS   --   3.8   --      Recent Labs      11/06/17   1043   SGOT  20   AP  292*   TP  6.6   ALB  2.2*   GLOB  4.4*     Recent Labs      11/06/17   1043   INR  1.6*   PTP  16.7*      No results for input(s): FE, TIBC, PSAT, FERR in the last 72 hours. No results found for: FOL, RBCF   No results for input(s): PH, PCO2, PO2 in the last 72 hours. No results for input(s): CPK, CKMB in the last 72 hours.     No lab exists for component: TROPONINI  No components found for: [place]  Lab Results   Component Value Date/Time    Color DARK YELLOW 11/07/2017 02:53 AM    Appearance CLOUDY 11/07/2017 02:53 AM    Specific gravity 1.016 11/07/2017 02:53 AM    pH (UA) 5.5 11/07/2017 02:53 AM    Protein TRACE 11/07/2017 02:53 AM    Glucose NEGATIVE  11/07/2017 02:53 AM    Ketone NEGATIVE  11/07/2017 02:53 AM    Bilirubin NEGATIVE  05/21/2017 10:35 PM    Urobilinogen 1.0 11/07/2017 02:53 AM    Nitrites NEGATIVE  11/07/2017 02:53 AM    Leukocyte Esterase MODERATE 11/07/2017 02:53 AM    Epithelial cells FEW 11/07/2017 02:53 AM    Bacteria NEGATIVE  11/07/2017 02:53 AM    WBC 5-10 11/07/2017 02:53 AM    RBC 5-10 11/07/2017 02:53 AM       MEDICATIONS:  Current Facility-Administered Medications   Medication Dose Route Frequency    apixaban (ELIQUIS) tablet 5 mg  5 mg Oral BID    furosemide (LASIX) 10 mg/ml infusion  10 mg/hr IntraVENous CONTINUOUS    sodium chloride (NS) flush 5-10 mL  5-10 mL IntraVENous Q8H    sodium chloride (NS) flush 5-10 mL  5-10 mL IntraVENous PRN    acetaminophen (TYLENOL) tablet 650 mg  650 mg Oral Q4H PRN    ondansetron (ZOFRAN) injection 4 mg  4 mg IntraVENous Q4H PRN    bisacodyl (DULCOLAX) suppository 10 mg  10 mg Rectal DAILY PRN    amiodarone (CORDARONE) tablet 200 mg  200 mg Oral DAILY    DOBUTamine (DOBUTREX) 1,000 mg/250 mL (4,000 mcg/mL) infusion  5 mcg/kg/min IntraVENous TITRATE    insulin lispro (HUMALOG) injection   SubCUTAneous AC&HS    glucose chewable tablet 16 g  4 Tab Oral PRN    dextrose (D50W) injection syrg 12.5-25 g  12.5-25 g IntraVENous PRN    glucagon (GLUCAGEN) injection 1 mg  1 mg IntraMUSCular PRN    insulin lispro (HUMALOG) injection 5 Units  5 Units SubCUTAneous TIDAC    insulin glargine (LANTUS) injection 20 Units  20 Units SubCUTAneous DAILY

## 2017-11-09 NOTE — PROGRESS NOTES
PCU SHIFT NURSING NOTE      Bedside and Verbal shift change report given to Tammy Persaud (oncoming nurse) by Faviola Guaman RN (offgoing nurse). Report included the following information SBAR, Intake/Output, MAR, Recent Results, Med Rec Status and Cardiac Rhythm paced]. Shift Summary:  0800: Pt request to get up into chair, assisted to chair without difficulty. 1000: Pt assisted to bathroom, had large BM, assisted back to chair, denies any needs at this time. 1200: Pt assisted to bathroom, c/o diarrhea. Admission Date 11/6/2017   Admission Diagnosis Heart failure ([facility] [address]   Consults IP CONSULT TO CARDIOLOGY  IP CONSULT TO HEMATOLOGY  IP CONSULT TO PALLIATIVE CARE - PROVIDER  IP CONSULT TO GASTROENTEROLOGY  IP CONSULT TO UROLOGY  IP CONSULT TO NEPHROLOGY        Consults   []PT   []OT   []Speech   []Case Management      [] Palliative      Cardiac Monitoring Order   []Yes   []No     IV drips   []Yes    Drip:                            Dose:  Drip:                            Dose:  Drip:                            Dose:   []No     GI Prophylaxis   []Yes   []No         DVT Prophylaxis   SCDs:  Sequential Compression Device: Bilateral     Patient Refused VTE Prophylaxis: Yes    Maycol stockings:  Graduated Compression Stockings: Bilateral      [] Medication   []Contraindicated   []None      Activity Level Activity Level: Bed Rest     Activity Assistance: Complete care   Purposeful Rounding every 1-2 hour? []Yes   Abernathy Score  Total Score: 3   Bed Alarm (If score 3 or >)   []Yes   [] Refused (See signed refusal form in chart)   Panfilo Score  Panfilo Score: 16   Panfilo Score (if score 14 or less)   []PMT consult   []Wound Care consult      []Specialty bed   [] Nutrition consult          Needs prior to discharge:   Home O2 required:    []Yes   []No    If yes, how much O2 required?     Other:    Last Bowel Movement: Last Bowel Movement Date: 11/08/17      Influenza Vaccine          Pneumonia Vaccine           Diet Active Orders   Diet    DIET DIABETIC CONSISTENT CARB Regular; 2 GM NA (House Low NA); FR 1500ML      LDAs               Peripheral IV 11/06/17 Right Antecubital (Active)   Site Assessment Clean, dry, & intact 11/9/2017  7:16 AM   Phlebitis Assessment 0 11/9/2017  7:16 AM   Infiltration Assessment 0 11/9/2017  7:16 AM   Dressing Status Clean, dry, & intact 11/9/2017  7:16 AM   Dressing Type Transparent 11/9/2017  7:16 AM   Hub Color/Line Status Infusing 11/9/2017  7:16 AM   Action Taken Open ports on tubing capped 11/8/2017 12:00 AM   Alcohol Cap Used Yes 11/8/2017 12:00 AM       Peripheral IV 11/07/17 Right (Active)   Site Assessment Clean, dry, & intact 11/9/2017  7:16 AM   Phlebitis Assessment 0 11/9/2017  7:16 AM   Infiltration Assessment 0 11/9/2017  7:16 AM   Dressing Status Clean, dry, & intact 11/9/2017  7:16 AM   Dressing Type Transparent 11/9/2017  7:16 AM   Hub Color/Line Status Infusing 11/9/2017  7:16 AM   Action Taken Open ports on tubing capped 11/7/2017  7:49 PM   Alcohol Cap Used Yes 11/8/2017 12:00 AM                      Urinary Catheter Urinary Catheter 11/06/17 Coude-Criteria for Appropriate Use: Medically/surgically unstable    Intake & Output   Date 11/08/17 0700 - 11/09/17 0659 11/09/17 0700 - 11/10/17 0659   Shift 0700-1859 1900-0659 24 Hour Total 0700-1859 1900-0659 24 Hour Total   I  N  T  A  K  E   I.V.  (mL/kg/hr) 4.4  (0)  4.4  (0)         Lasix Volume 0.3  0.3         DOBUTamine Volume 4.1  4.1       Shift Total  (mL/kg) 4.4  (0)  4.4  (0)      O  U  T  P  U  T   Urine  (mL/kg/hr) 700  (0.6) 900  (0.8) 1600  (0.7)         Urine Voided 700  700         Urine Output (mL) (Urinary Catheter 11/06/17 Coude)  900 900       Shift Total  (mL/kg) 700  (7.1) 900  (9.2) 1600  (16.3)      NET -695.6 -900 -1595.6      Weight (kg) 98 98.2 98.2 98.2 98.2 98.2         Readmission Risk Assessment Tool Score High Risk            26       Total Score        3 Has Seen PCP in Last 6 Months (Yes=3, No=0)    5 Pt. Coverage (Medicare=5 , Medicaid, or Self-Pay=4)    18 Charlson Comorbidity Score (Age + Comorbid Conditions)        Criteria that do not apply:    . Living with Significant Other. Assisted Living. LTAC. SNF.  or   Rehab    Patient Length of Stay (>5 days = 3)    IP Visits Last 12 Months (1-3=4, 4=9, >4=11)       Expected Length of Stay 4d 12h   Actual Length of Stay 3

## 2017-11-09 NOTE — PROGRESS NOTES
Gastroenterology Progress Note    11/9/2017    Admit Date: 11/6/2017    Subjective: Follow up for: anemia, pancreatic head mass      Feels weak but better. Denies any GI issues. Hgb is stable. Patient was seen in rounds by me today. RN is at bedside. Current Facility-Administered Medications   Medication Dose Route Frequency    apixaban (ELIQUIS) tablet 5 mg  5 mg Oral BID    furosemide (LASIX) 10 mg/ml infusion  10 mg/hr IntraVENous CONTINUOUS    sodium chloride (NS) flush 5-10 mL  5-10 mL IntraVENous Q8H    sodium chloride (NS) flush 5-10 mL  5-10 mL IntraVENous PRN    acetaminophen (TYLENOL) tablet 650 mg  650 mg Oral Q4H PRN    ondansetron (ZOFRAN) injection 4 mg  4 mg IntraVENous Q4H PRN    bisacodyl (DULCOLAX) suppository 10 mg  10 mg Rectal DAILY PRN    amiodarone (CORDARONE) tablet 200 mg  200 mg Oral DAILY    DOBUTamine (DOBUTREX) 1,000 mg/250 mL (4,000 mcg/mL) infusion  5 mcg/kg/min IntraVENous TITRATE    insulin lispro (HUMALOG) injection   SubCUTAneous AC&HS    glucose chewable tablet 16 g  4 Tab Oral PRN    dextrose (D50W) injection syrg 12.5-25 g  12.5-25 g IntraVENous PRN    glucagon (GLUCAGEN) injection 1 mg  1 mg IntraMUSCular PRN    insulin lispro (HUMALOG) injection 5 Units  5 Units SubCUTAneous TIDAC    insulin glargine (LANTUS) injection 20 Units  20 Units SubCUTAneous DAILY        Objective:     Blood pressure (!) 102/36, pulse 72, temperature 97.8 °F (36.6 °C), resp. rate 20, height 5' 9\" (1.753 m), weight 98.2 kg (216 lb 9.6 oz), SpO2 93 %. 11/07 1901 - 11/09 0700  In: 404.8 [P.O.:120;  I.V.:284.8]  Out: 3418 [Urine:2430]        Physical Examination:       General:AAO x 3,   HEENT:  EOMI,   Chest NA  Heart: S1, S2,   GI: Soft, NT, ND + bowel sounds  Extremities: edema    Data Review    Recent Results (from the past 24 hour(s))   GLUCOSE, POC    Collection Time: 11/08/17 11:08 AM   Result Value Ref Range    Glucose (POC) 142 (H) 65 - 100 mg/dL Performed by Bill Espinal    GLUCOSE, POC    Collection Time: 11/08/17  3:30 PM   Result Value Ref Range    Glucose (POC) 119 (H) 65 - 100 mg/dL    Performed by Bill Espinal    GLUCOSE, POC    Collection Time: 11/08/17 10:09 PM   Result Value Ref Range    Glucose (POC) 93 65 - 100 mg/dL    Performed by Nelda Fernandez*    METABOLIC PANEL, BASIC    Collection Time: 11/09/17  3:24 AM   Result Value Ref Range    Sodium 133 (L) 136 - 145 mmol/L    Potassium 4.1 3.5 - 5.1 mmol/L    Chloride 98 97 - 108 mmol/L    CO2 27 21 - 32 mmol/L    Anion gap 8 5 - 15 mmol/L    Glucose 68 65 - 100 mg/dL    BUN 54 (H) 6 - 20 MG/DL    Creatinine 2.02 (H) 0.70 - 1.30 MG/DL    BUN/Creatinine ratio 27 (H) 12 - 20      GFR est AA 39 (L) >60 ml/min/1.73m2    GFR est non-AA 32 (L) >60 ml/min/1.73m2    Calcium 8.2 (L) 8.5 - 10.1 MG/DL   MAGNESIUM    Collection Time: 11/09/17  3:24 AM   Result Value Ref Range    Magnesium 2.1 1.6 - 2.4 mg/dL   CBC W/O DIFF    Collection Time: 11/09/17  3:24 AM   Result Value Ref Range    WBC 25.9 (H) 4.1 - 11.1 K/uL    RBC 3.27 (L) 4.10 - 5.70 M/uL    HGB 8.1 (L) 12.1 - 17.0 g/dL    HCT 25.6 (L) 36.6 - 50.3 %    MCV 78.3 (L) 80.0 - 99.0 FL    MCH 24.8 (L) 26.0 - 34.0 PG    MCHC 31.6 30.0 - 36.5 g/dL    RDW 17.7 (H) 11.5 - 14.5 %    PLATELET 324 021 - 668 K/uL   GLUCOSE, POC    Collection Time: 11/09/17  8:05 AM   Result Value Ref Range    Glucose (POC) 82 65 - 100 mg/dL    Performed by Bill Espinal      Recent Labs      11/09/17   0324  11/08/17   0425   WBC  25.9*  28.1*   HGB  8.1*  8.1*   HCT  25.6*  24.5*   PLT  317  296     Recent Labs      11/09/17   0324  11/08/17   0425  11/07/17   0253   NA  133*  133*  129*   K  4.1  4.1  5.0   CL  98  98  97   CO2  27  24  23   BUN  54*  54*  56*   CREA  2.02*  2.11*  2.22*   GLU  68  125*  178*   CA  8.2*  8.8  8.7   MG  2.1  2.1  2.1   PHOS   --   3.8   --      Recent Labs      11/06/17   1043   SGOT  20   AP  292*   TP  6.6   ALB  2.2*   GLOB 4.4*     Recent Labs      11/06/17   1043   INR  1.6*   PTP  16.7*      No results for input(s): FE, TIBC, PSAT, FERR in the last 72 hours. No results found for: FOL, RBCF   No results for input(s): PH, PCO2, PO2 in the last 72 hours. Recent Labs      11/06/17   1043   TROIQ  0.04     Lab Results   Component Value Date/Time    Cholesterol, total 74 08/18/2017 09:08 AM    HDL Cholesterol 32 08/18/2017 09:08 AM    LDL, calculated 26.4 08/18/2017 09:08 AM    Triglyceride 78 08/18/2017 09:08 AM    CHOL/HDL Ratio 2.3 08/18/2017 09:08 AM     No components found for: [place]  Lab Results   Component Value Date/Time    Color DARK YELLOW 11/07/2017 02:53 AM    Appearance CLOUDY 11/07/2017 02:53 AM    Specific gravity 1.016 11/07/2017 02:53 AM    pH (UA) 5.5 11/07/2017 02:53 AM    Protein TRACE 11/07/2017 02:53 AM    Glucose NEGATIVE  11/07/2017 02:53 AM    Ketone NEGATIVE  11/07/2017 02:53 AM    Bilirubin NEGATIVE  05/21/2017 10:35 PM    Urobilinogen 1.0 11/07/2017 02:53 AM    Nitrites NEGATIVE  11/07/2017 02:53 AM    Leukocyte Esterase MODERATE 11/07/2017 02:53 AM    Epithelial cells FEW 11/07/2017 02:53 AM    Bacteria NEGATIVE  11/07/2017 02:53 AM    WBC 5-10 11/07/2017 02:53 AM    RBC 5-10 11/07/2017 02:53 AM        ROS: -CP, SOB, Dysuria, palpitations, cough. Assessment:  Anemia  Mass in pancreas with elevated CA 19-9    Active Problems:    Chronic systolic heart failure ([facility] (4/27/2012)      DM (diabetes mellitus) ([facility] (4/27/2012)      A-fib ([facility] (12/17/2015)      Metastatic cancer ([facility] (11/6/2017)      CKD (chronic kidney disease) (11/6/2017)      Hyponatremia (11/6/2017)             Plan/Discussion:     1. Anemia is stable on Eliquis. Hgb is low but has not dropped. He denies any bleeding. 2. Pancreatic mass with very high CEA suggestive of malignancy. Question is whether this is a hypernephroma with mets or a primary pancreatic cancer with mets?  In any event his prognosis is limited with his multiple co-morbidities. Any invasive procedure/s at this juncture will be fraught with risk. I reviewed palliative care notes and help. His code status is a DNR. EUS with FNA can be done as the mass is very large but will be risky and not absolutely indicated as he is in very poor general health. Need oncology input, inpatient or as OP, to see if we will have any gain from tissue diagnosis of the pancreatic head mass. 3. Disposition as per cards/IM. Signed By: Marvel Brown MD    11/9/2017  8:10 AM

## 2017-11-09 NOTE — PROGRESS NOTES
[address] [place] [address] [facility]  [phone]      Cardiology Progress Note      11/9/2017 2:20 PM    Admit Date: 11/6/2017    Admit Diagnosis:   Heart failure (HCC)    Subjective:     Brandt Perez. is a 78 y.o. 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 Overnight events:  -BP intermittently low  - H/H steady; creat down to 2.02;   -weight stable; I/O incomplete, but increased urine output  -Mr. Azeem Ritchie states he's feeling quite a bit better today. He has been able to walk to the bathroom with a walker without much increased WILSON. Still some SOB. No chest pain or palpitations.       Visit Vitals    BP (!) 86/40 (BP 1 Location: Left arm, BP Patient Position: At rest)    Pulse 70    Temp 97.6 °F (36.4 °C)    Resp 19    Ht 5' 9\" (1.753 m)    Wt 98.2 kg (216 lb 9.6 oz)    SpO2 95%    BMI 31.99 kg/m2       Current Facility-Administered Medications   Medication Dose Route Frequency    apixaban (ELIQUIS) tablet 5 mg  5 mg Oral BID    furosemide (LASIX) 10 mg/ml infusion  10 mg/hr IntraVENous CONTINUOUS    sodium chloride (NS) flush 5-10 mL  5-10 mL IntraVENous Q8H    sodium chloride (NS) flush 5-10 mL  5-10 mL IntraVENous PRN    acetaminophen (TYLENOL) tablet 650 mg  650 mg Oral Q4H PRN    ondansetron (ZOFRAN) injection 4 mg  4 mg IntraVENous Q4H PRN    bisacodyl (DULCOLAX) suppository 10 mg  10 mg Rectal DAILY PRN    amiodarone (CORDARONE) tablet 200 mg  200 mg Oral DAILY    DOBUTamine (DOBUTREX) 1,000 mg/250 mL (4,000 mcg/mL) infusion  5 mcg/kg/min IntraVENous TITRATE    insulin lispro (HUMALOG) injection   SubCUTAneous AC&HS    glucose chewable tablet 16 g  4 Tab Oral PRN    dextrose (D50W) injection syrg 12.5-25 g  12.5-25 g IntraVENous PRN    glucagon (GLUCAGEN) injection 1 mg  1 mg IntraMUSCular PRN    insulin lispro (HUMALOG) injection 5 Units  5 Units SubCUTAneous TIDAC    insulin glargine (LANTUS) injection 20 Units  20 Units SubCUTAneous DAILY       Objective:      Physical Exam:  General: pleasant, obese AAM sitting in chair in NAD  Heart: RRR, 3/6 systolic murmur  Lungs: clear/dim   Abdomen: Soft, +BS, NTND   Extremities: LE rajan +DP/PT, 2-3+ pitting edema to waist BLE; 1+ pitting edema BUE   Neurologic: Grossly normal  Skin:  Warm and dry.     Data Review:   Recent Labs      11/09/17 0324 11/08/17 0425 11/07/17 0253   WBC  25.9*  28.1*  29.6*   HGB  8.1*  8.1*  8.1*   HCT  25.6*  24.5*  25.8*   PLT  317  296  321     Recent Labs      11/09/17 0324 11/08/17 0425 11/07/17 0253   NA  133*  133*  129*   K  4.1  4.1  5.0   CL  98  98  97   CO2  27  24  23   GLU  68  125*  178*   BUN  54*  54*  56*   CREA  2.02*  2.11*  2.22*   CA  8.2*  8.8  8.7   MG  2.1  2.1  2.1   PHOS   --   3.8   --        No results for input(s): TROIQ, CPK, CKMB in the last 72 hours. Intake/Output Summary (Last 24 hours) at 11/09/17 1129  Last data filed at 11/09/17 0525   Gross per 24 hour   Intake                0 ml   Output             1600 ml   Net            -1600 ml        Telemetry: AV paced  ECG: v-paced   Echocardiogram: EF 40-45%; mod diffuse hypokinesis; LA dilation; mild MR; mod TR; mod PHTN  CXRAY:small R effusion     Assessment:     Active Problems:    Chronic systolic heart failure ([facility] (4/27/2012)      DM (diabetes mellitus) ([facility] (4/27/2012)      A-fib ([facility] (12/17/2015)      Metastatic cancer ([facility] (11/6/2017)      CKD (chronic kidney disease) (11/6/2017)      Hyponatremia (11/6/2017)        Plan:     Chronic systolic heart failure: presented for worsening peripheral edema after being off diuretics for two weeks. Admission weight almost 20# above his regular home weight. With hyponatremia, hypotension, CKD, low albumin on admission.   Repeat ECHO with improved EF, however, patient was on dobutamine during exam  · Patient with improved diuresis on dobutamine and lasix drip and slight improvement in creatinine. · Hyponatremia improving with diuresis  · BB and ACI on hold  · Modified dobutamine orders so that nurses cannot titrate up as high, and I also encouraged attempting to wean. · Palliative following  · Poor prognosis if unable to wean down/off his dobutamine. A-fib:  Currently AV pacing  · Continue eliquis  · Watch H/H with initiation of eliquis  · amio PO      BARBARA:  Creatinine improving slightly  · On dobutamine and lasix as above  · Nephrology following:  See that patient is not a likely HD candidate        Per notes, patient has a very poor prognosis for multiple reasons. Patient is DNR with palliative following.        Miri Cooper NP  DNP, RN, AGACNP-BC

## 2017-11-09 NOTE — PROGRESS NOTES
Hospitalist Progress Note    NAME: Jessica Glass. :  1940   MRN:  [id number]       Assessment / Plan:  Hyponatremia, likely due to vol overload, monitor   CKD 4  Oligouric, improved  - on lasix gtt, outpt improved. Discussed case with Dr Fabiola Cat. Note reviewed. Pt is not a candidate for HD given metastatic CA with poor cardiomyopathy with poor EF. -appreciate nephro following, cont' to monitor while on lasix gtt, hope to transition to PO. Acute on chronic systolic heart failure s/p BIV-ICD  Ischemic cardiomyopathy  Persistent Atrial fibrillation  Hypotension   -diuretic discontinued 2 weeks ago due to worsening of renal failure  -cxr showed pulmonary edema with small R pleural effusion, trop neg, probnp 30K  -last Echo showed EF15-20%, repeat Echo EF improved to 40-45%  -cont' dobutamin gtt, lasix gtt as above  -off Eliquis due to hx of GI bleed, now re-started. No ecidence of active bleed  -palliative consultation appreciated.      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 Discussed with RN events overnight.       Review of Systems:  Symptom Y/N Comments  Symptom Y/N Comments   Fever/Chills n   Chest Pain n    Poor Appetite    Edema y    Cough    Abdominal Pain     Sputum    Joint Pain     SOB/WILSON y   Pruritis/Rash     Nausea/vomit    Tolerating PT/OT     Diarrhea    Tolerating Diet y    Constipation    Other       Could NOT obtain due to:      Objective:     VITALS:   Last 24hrs VS reviewed since prior progress note. Most recent are:  Patient Vitals for the past 24 hrs:   Temp Pulse Resp BP SpO2   11/09/17 0757 97.8 °F (36.6 °C) 72 20 (!) 102/36 93 %   11/09/17 0412 98.4 °F (36.9 °C) 74 18 104/41 96 %   11/09/17 0312 - - - 93/40 -   11/09/17 0306 - - - (!) 92/36 -   11/09/17 0003 98.4 °F (36.9 °C) 73 20 101/42 96 %   11/08/17 2229 - - - - 98 %   11/08/17 2022 98.4 °F (36.9 °C) 70 20 107/42 98 %   11/08/17 1527 97.7 °F (36.5 °C) 70 19 100/43 98 %   11/08/17 1130 - - - (!) 83/48 -   11/08/17 1129 - - - 103/42 -   11/08/17 1117 97.7 °F (36.5 °C) 74 21 101/45 97 %   11/08/17 0945 97.5 °F (36.4 °C) 73 17 91/43 95 %       Intake/Output Summary (Last 24 hours) at 11/09/17 0901  Last data filed at 11/09/17 0525   Gross per 24 hour   Intake                0 ml   Output             1600 ml   Net            -1600 ml        PHYSICAL EXAM:  General: WD, WN. Alert, cooperative, no acute distress    EENT:  EOMI. Anicteric sclerae. MMM  Resp:  CTA bilaterally, no wheezing or rales. No accessory muscle use  CV:  Regular  rhythm,  +2b/l LE edema  GI:  Soft, Non distended, Non tender.  +BS  Neurologic:  Alert and oriented X 3, normal speech  Psych:   Good insight. Not anxious nor agitated  Skin:  No rashes. No jaundice    Reviewed most current lab test results and cultures  YES  Reviewed most current radiology test results   YES  Review and summation of old records today    NO  Reviewed patient's current orders and MAR    YES  PMH/SH reviewed - no change compared to H&P  ________________________________________________________________________  Care Plan discussed with:    Comments   Patient x    Family      RN x    Care Manager     Consultant  x Nephro                     Multidiciplinary team rounds were held today with , nursing, pharmacist and clinical coordinator. Patient's plan of care was discussed; medications were reviewed and discharge planning was addressed. ________________________________________________________________________  Total NON critical care TIME:  35   Minutes    Total CRITICAL CARE TIME Spent:   Minutes non procedure based      Comments   >50% of visit spent in counseling and coordination of care     ________________________________________________________________________  Gilford Lack, MD     Procedures: see electronic medical records for all procedures/Xrays and details which were not copied into this note but were reviewed prior to creation of Plan. LABS:  I reviewed today's most current labs and imaging studies.   Pertinent labs include:  Recent Labs      11/09/17   0324 11/08/17 0425 11/07/17   0253   WBC  25.9*  28.1*  29.6*   HGB  8.1*  8.1*  8.1*   HCT  25.6*  24.5*  25.8*   PLT  317  296  321     Recent Labs      11/09/17   0324  11/08/17   0425  11/07/17   0253  11/06/17   1043   NA  133*  133*  129*  129*   K  4.1  4.1  5.0  4.3   CL  98  98  97  93*   CO2  27  24  23  28   GLU  68  125*  178*  358*   BUN  54*  54*  56*  52*   CREA  2.02*  2.11*  2.22*  2.31*   CA  8.2*  8.8  8.7  8.9   MG  2.1  2.1  2.1  2.3   PHOS   --   3.8   --    --    ALB   --    --    --   2.2*   TBILI   --    --    --   2.7*   SGOT   --    --    --   20   ALT   --    --    --   12   INR   --    --    --   1.6*       Signed: Gilford Lack, MD

## 2017-11-09 NOTE — PROGRESS NOTES
Problem: Self Care Deficits Care Plan (Adult)  Goal: *Acute Goals and Plan of Care (Insert Text)  Occupational Therapy Goals  Initiated 11/7/2017  1. Patient will perform grooming in standing with minimal assistance/contact guard assist within 7 day(s). 2.  Patient will perform bathing with moderate assistance  within 7 day(s). 3.  Patient will perform upper body dressing and lower body dressing with moderate assistance  within 7 day(s). 4.  Patient will perform toilet transfers with minimal assistance/contact guard assist within 7 day(s). 5.  Patient will perform all aspects of toileting with minimal assistance/contact guard assist within 7 day(s). 6.  Patient will participate in upper extremity therapeutic exercise/activities with supervision/set-up for 10 minutes within 7 day(s). 7.  Patient will perform standing adls for at least 8 minutes with CGA within 7 day(s). Occupational Therapy TREATMENT  Patient: Anna Quintana (71 y.o. male)  Date: 11/9/2017  Diagnosis: Heart failure (HCC) <principal problem not specified>       Precautions: Fall, Skin    ASSESSMENT:  Pt initially declined therapy, but was encouraged by his family members to participate. Pt was agreeable to perform standing and standing marching for about one minute-he complained of feeling dizzy/lightheaded. Pt required less assistance to perform sit to and from standing this date (Adrienne). Pt returned to sitting in the chair and performed seated exercises, his tolerance was limited to about 5 repetitions in UEs and 10 in BLEs. Noted pt's LLE to be weeping and pt's LEs were propped up in the recliner (pillow and absorbant pad placed) and socks removed heels floated. Pt verbalized comfort and smiled at the end of the tx session. Improved tolerance for activity, however, continues to demonstrate poor endurance. He will need 24 hour assistance at discharge and may benefit from [facility] therapies, depending on his progress. Progression toward goals:  []       Improving appropriately and progressing toward goals  [x]       Improving slowly and progressing toward goals  []       Not making progress toward goals and plan of care will be adjusted     PLAN:  Patient continues to benefit from skilled intervention to address the above impairments. Continue treatment per established plan of care. Discharge Recommendations:  24 hour assistance for adls and mobility  Further Equipment Recommendations for Discharge:  tbd     SUBJECTIVE:   Patient stated I feel dizzy.   (upon standing)    OBJECTIVE DATA SUMMARY:   Cognitive/Behavioral Status:  Neurologic State: Alert  Orientation Level: Oriented X4  Cognition: Follows commands             Functional Mobility and Transfers for ADLs:  Bed Mobility:  Scooting: Supervision    Transfers:  Sit to Stand: Minimum assistance;Assist x1;Additional time (from bedside chair)       Balance:  Sitting: Intact  Sitting - Static: Good (unsupported)  Sitting - Dynamic:   Standing: Impaired; With support  Standing - Static: Good;Constant support  Standing - Dynamic : Fair    ADL Intervention:   pt declined self care tasks and was agreeable to standing and seated exercises in bedside recliner chair. Therapeutic Exercises:   Standing for approx. One minute. Performed standing marching for approx 15 seconds then complained of fatigue. Seated exercises: R IV lines in wrist and elbow limit flexion in RUE -- prevent his drip from occluding. Exercises performed slowly and with rest breaks after each exercise.      EXERCISE   Sets   Reps   Active Active Assist   Passive   Comments   Ankle pumps 1 10 [x]           []           []              Heel raises 1 10 [x]           []           []              Knee flex/ext 1 10 [x]           []           []           R and L   Seated marching 1 10 [x]           []           []              B shoulder shrugs 1 10 [x]           [] []              Head/neck rotation 1 3 [x]           []           []           R and L   B shoulder rotation 1 5 [x]           []           []           Ant/post   Deep breathing/pursed lip 1 10 [x]           []           []           difficult for pt - will need further practice and encouragement      []           []           []                 []           []           []                 []           []           []              Pt alert, oriented and conversing with his family at the end of tx session verbalized comfort and 0 dizziness post seated exercises.   NA in to take BS reading which was at 90  Pain:  Pain Scale 1: Numeric (0 - 10)  Pain Intensity 1: 0              Activity Tolerance:   Fair,  Pt reporting fatigue with the exercise reps and standing tolerance as well as dizziness in standing    After treatment:   [x] Patient left in no apparent distress sitting up in chair  [] Patient left in no apparent distress in bed  [x] Call bell left within reach  [x] Nursing notified  [x] Caregiver present  [] Bed alarm activated    COMMUNICATION/COLLABORATION:   The patients plan of care was discussed with: Registered Nurse and Certified [facility] [address] [facility], OTR/L  Time Calculation: 22 mins

## 2017-11-10 NOTE — PROGRESS NOTES
Problem: Mobility Impaired (Adult and Pediatric)  Goal: *Acute Goals and Plan of Care (Insert Text)  Physical Therapy Goals  Initiated 11/7/2017  1. Patient will move from supine to sit and sit to supine  and roll side to side in bed with independence within 7 day(s). 2.  Patient will transfer from bed to chair and chair to bed with independence using the least restrictive device within 7 day(s). 3.  Patient will perform sit to stand with independence within 7 day(s). 4.  Patient will ambulate with modified independence for 100 feet with the least restrictive device within 7 day(s). 5.  Patient will ascend/descend 3 stairs with 2 handrail(s) with modified independence within 7 day(s). physical Therapy TREATMENT  Patient: Mickey Rosas (71 y.o. male)  Date: 11/10/2017  Diagnosis: Heart failure (HCC)        Precautions: Fall, Skin  Chart, physical therapy assessment, plan of care and goals were reviewed. ASSESSMENT: pt progressing towards goals, no LOB, needed several standing rest breaks, no SOBdid well with transfers and ther-ex, vc's for safety and proper RW use. Progression toward goals:  []      Improving appropriately and progressing toward goals  [x]      Improving slowly and progressing toward goals  []      Not making progress toward goals and plan of care will be adjusted     PLAN:  Patient continues to benefit from skilled intervention to address the above impairments. Continue treatment per established plan of care.   Discharge Recommendations:  Home Health with 24/7 supervision  Further Equipment Recommendations for Discharge:  rolling walker     OBJECTIVE DATA SUMMARY:     Critical Behavior:  Neurologic State: Alert, Eyes open spontaneously  Orientation Level: Oriented X4  Cognition: Appropriate decision making, Appropriate for age attention/concentration, Appropriate safety awareness, Follows commands  Safety/Judgement: Decreased awareness of need for assistance, Decreased awareness of need for safety, Fall prevention, Insight into deficits     Functional Mobility Training:  Bed Mobility: pt sitting in chair on arrival    Transfers:  Sit to Stand: Minimum assistance;Assist x2; Additional time  Stand to Sit: Contact guard assistance  Bed to Chair: Contact guard assistance  Interventions: Tactile cues; Verbal cues  Level of Assistance: Minimum assistance     Balance:  Sitting: Intact; Without support  Sitting - Static: Good (unsupported)  Sitting - Dynamic: Not tested  Standing: Impaired; Without support  Standing - Static: Good;Constant support  Standing - Dynamic : Fair     Ambulation/Gait Training:  Distance (ft): 140 Feet (ft)  Assistive Device: Gait belt;Walker, rolling  Ambulation - Level of Assistance: Contact guard assistance  Gait Abnormalities: Decreased step clearance  Right Side Weight Bearing: Full  Left Side Weight Bearing: Full  Base of Support: Widened  Speed/Paulina: Pace decreased (<100 feet/min)  Step Length: Left shortened;Right shortened    Therapeutic Exercises:   sitting  EXERCISE   Sets   Reps   Active Active Assist   Passive   Comments   Ankle pumps 1 10 [x] [] [] bilat   Heel raises 1 10 [x] [] [] \"   Toe tap 1 10 [x] [] [] \"   Knee ext 1 10 [x] [] [] \"   Hip flex 1 10 [x] [] [] \"     Pain:  Pain Scale 1: Numeric (0 - 10)  Pain Intensity 1: 0    Activity Tolerance: fair    After treatment:   [x] Patient left in no apparent distress sitting up in chair  [] Patient left in no apparent distress in bed  [x] Call bell left within reach  [x] Nursing notified  [] Caregiver present  [] Bed alarm activated    COMMUNICATION/COLLABORATION:   The patients plan of care was discussed with: Registered Nurse    Brian Gavin PTA   Time Calculation: 25 mins

## 2017-11-10 NOTE — PROGRESS NOTES
Hospitalist Progress Note    NAME: Earle Torres. :  1940   MRN:  [id number]       Assessment / Plan:  Hyponatremia, likely due to vol overload, monitor   CKD 4  Oligouric, improved  - on lasix gtt, cr and urine outpt improved.  -pt is not a candidate for HD given metastatic CA with poor cardiomyopathy with poor EF. -appreciate nephro following, cont' to monitor while on lasix gtt, hope to transition to PO soon    Acute on chronic systolic heart failure s/p BIV-ICD  Ischemic cardiomyopathy  Persistent Atrial fibrillation  Hypotension   -diuretic discontinued 2 weeks ago due to worsening of renal failure  -cxr showed pulmonary edema with small R pleural effusion, trop neg, probnp 30K  -last Echo showed EF15-20%, repeat Echo EF improved to 40-45% while on dobutamine gtt.  -cont' dobutamin gtt, lasix gtt as above, will plan to keep both gtt to continue diuresing over the weekend. 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 Discussed with RN events overnight.       Review of Systems:  Symptom Y/N Comments  Symptom Y/N Comments   Fever/Chills n   Chest Pain n    Poor Appetite    Edema y    Cough    Abdominal Pain     Sputum    Joint Pain     SOB/WILSON y   Pruritis/Rash     Nausea/vomit    Tolerating PT/OT     Diarrhea    Tolerating Diet y    Constipation    Other       Could NOT obtain due to:      Objective:     VITALS:   Last 24hrs VS reviewed since prior progress note. Most recent are:  Patient Vitals for the past 24 hrs:   Temp Pulse Resp BP SpO2   11/10/17 1208 - 73 - (!) 98/39 -   11/10/17 0700 97.8 °F (36.6 °C) 70 20 102/44 95 %   11/10/17 0400 97.8 °F (36.6 °C) 70 20 101/44 93 %   11/10/17 0200 - 70 - 107/40 97 %   11/10/17 0030 - 70 - 110/43 95 %   11/10/17 0001 - 71 - 108/44 93 %   11/09/17 2330 - 70 - 102/44 95 %   11/09/17 2300 97.8 °F (36.6 °C) 70 - (!) 114/37 98 %   11/09/17 2200 - 72 - 105/43 98 %   11/09/17 2130 - 74 - 106/46 94 %   11/09/17 1921 97.7 °F (36.5 °C) 75 20 (!) 98/35 96 %   11/09/17 1727 97.6 °F (36.4 °C) 70 20 (!) 95/38 98 %       Intake/Output Summary (Last 24 hours) at 11/10/17 1247  Last data filed at 11/10/17 0648   Gross per 24 hour   Intake          1491.72 ml   Output             2465 ml   Net          -973.28 ml        PHYSICAL EXAM:  General: WD, WN. Alert, cooperative, no acute distress    EENT:  EOMI. Anicteric sclerae. MMM  Resp:  CTA bilaterally, no wheezing or rales. No accessory muscle use  CV:  Regular  rhythm,  +2 b/l LE edema  GI:  Soft, Non distended, Non tender.  +BS  Neurologic:  Alert and oriented X 3, normal speech  Psych:   Good insight. Not anxious nor agitated  Skin:  No rashes. No jaundice    Reviewed most current lab test results and cultures  YES  Reviewed most current radiology test results   YES  Review and summation of old records today    NO  Reviewed patient's current orders and MAR    YES  PMH/SH reviewed - no change compared to H&P  ________________________________________________________________________  Care Plan discussed with:    Comments   Patient x    Family      RN x    Care Manager     Consultant  x Nephro/cardiology                     Multidiciplinary team rounds were held today with , nursing, pharmacist and clinical coordinator. Patient's plan of care was discussed; medications were reviewed and discharge planning was addressed. ________________________________________________________________________  Total NON critical care TIME:  35   Minutes    Total CRITICAL CARE TIME Spent:   Minutes non procedure based      Comments   >50% of visit spent in counseling and coordination of care     ________________________________________________________________________  Tayo Preciado MD     Procedures: see electronic medical records for all procedures/Xrays and details which were not copied into this note but were reviewed prior to creation of Plan. LABS:  I reviewed today's most current labs and imaging studies.   Pertinent labs include:  Recent Labs      11/10/17   0317  11/09/17   0324  11/08/17   0425   WBC  28.9*  25.9*  28.1*   HGB  8.4*  8.1*  8.1*   HCT  26.8*  25.6*  24.5*   PLT  311  317  296     Recent Labs      11/10/17   0317  11/09/17   0324  11/08/17   0425   NA  134*  133*  133*   K  3.7  4.1  4.1   CL  98  98  98   CO2  27  27  24   GLU  76  68  125*   BUN  47*  54*  54*   CREA  1.88*  2.02*  2.11*   CA  8.3*  8.2*  8.8   MG   --   2.1  2.1   PHOS   --    --   3.8       Signed: Tayo Preciado MD

## 2017-11-10 NOTE — DIABETES MGMT
DTC Progress Note    Recommendations/ Comments: If appropriate, please consider changing correctional insulin scale to high sensitivity given pt current renal status. Also noted Lantus dose decrease to 15 units daily. Will continue to follow as needed. Chart reviewed on [facility] DR ROBLES. for hypoglycemia. Patient is a 78 y.o. male with known history of Type 2 Diabetes on Novolin 70/30 - 40 units acB and 30 units acD at home. A1c:   No results found for: HBA1C, HGBE8, QCQ1XBKB    Recent Glucose Results:   Lab Results   Component Value Date/Time    GLU 76 11/10/2017 03:17 AM    GLUCPOC 131 (H) 11/10/2017 11:37 AM    GLUCPOC 96 11/10/2017 07:56 AM    GLUCPOC 85 11/10/2017 01:38 AM        Lab Results   Component Value Date/Time    Creatinine 1.88 11/10/2017 03:17 AM     Estimated Creatinine Clearance: 39.6 mL/min (based on Cr of 1.88). Active Orders   Diet    DIET DIABETIC CONSISTENT CARB Regular; 2 GM NA (House Low NA); FR 1500ML        PO intake: No data found. Current hospital DM medication:   -Lantus 15 units daily   -Humalog normal sensitivity correction    Will continue to follow as needed.     Thank you    Erwin Goodwin, [address], [address]  Office: [phone]

## 2017-11-10 NOTE — PROGRESS NOTES
Initial Nutrition Assessment:    INTERVENTIONS/RECOMMENDATIONS:   · Meals/Snacks: General/healthful diet: Continue current diet  · Supplements: Commercial supplement: Change to ensure enlive TID per patient preference    ASSESSMENT:   Patient medically noted for CKD, metastatic renal cell carcinoma, heart failure, cardiomyopathy, AFIB, HTN, and DM. Patient reports an okay appetite; lunch in front of him basically untouched. MD ordered ensure clear with all meals today and patient had already drank one; reports liking it just fine. Multiple ensure original bottles at bedside brought from home; patient reports drinking these chocolate shakes 2-3x/day PTA. When asked which he liked better he stated he liked the chocolate better. Will change order to reflect patient preference. BG have been on the low side. Will monitor for now; discussed Glucerna shake with patient if needed. Patient reports poor appetite PTA as well as a 15# weight loss with UBW of ~207#. Current weight 221#; patient with edema to all extremities and being diuresed. Currently meets criteria for acute moderate malnutrition. Meets Criteria for Acute Malnutrition   [] Severe Malnutrition, as evidenced by:   [] Moderate muscle wasting, loss of subcutaneous fat   [] Nutritional intake of <50% of recommended intake for >5 days   [] Weight loss of >1-2% in 1 week, >5% in 1 month, >7.5% in 3 months, or >10% in 6 months   [x] Moderate-severe edema   []Moderate Malnutrition, as evidenced by:   [] Mild muscle wasting, loss of subcutaneous fat   [x] Nutritional intake <75% of recommended intake for >1 week   [] Weight loss of 1-2% in 1 week, 5% in 1 month, 7.5% in 3 months, or 10% in 6 months   [] Mild edema        Diet Order: Consistent carb (2g Na, 1500 mL fluid restriction)  % Eaten:  No data found.     Pertinent Medications: [x]Reviewed []Other: Amiodarone, Eliquis, Lantus, Humalog, Lasix  Pertinent Labs: [x]Reviewed []Other: Na 134, BUN 47, Cr 1.88, BG 249-74-10-20  Food Allergies: [x]None []Other   Last BM: 11/8   [x]Active     []Hyperactive  []Hypoactive       [] Absent BS  Skin:    [x] Intact   [] Incision  [] Breakdown  [] Other:    Anthropometrics:   Height: 5' 9\" (175.3 cm) Weight: 100.3 kg (221 lb 1.9 oz)   IBW (%IBW):   ( ) UBW (%UBW): 93.9 kg (207 lb) (  %)   Last Weight Metrics:  Weight Loss Metrics 11/9/2017 10/27/2017 9/29/2017 9/8/2017 8/28/2017 8/14/2017 8/11/2017   Today's Wt 221 lb 1.9 oz 227 lb 3.2 oz 222 lb 1 oz 218 lb 14.4 oz 208 lb 207 lb 12.8 oz 207 lb 9.6 oz   BMI 32.65 kg/m2 34.55 kg/m2 33.76 kg/m2 33.28 kg/m2 30.72 kg/m2 31.6 kg/m2 31.57 kg/m2       BMI: Body mass index is 32.65 kg/(m^2). This BMI is indicative of:   []Underweight    []Normal    []Overweight    [x] Obesity   [] Extreme Obesity (BMI>40)     Estimated Nutrition Needs (Based on):   1993 Kcals/day (BMR (1725) x 1. 3AF -250 kcal) , 80 g (0.8 g/kg bw) Protein  Carbohydrate: At Least 130 g/day  Fluids: 1500 mL/day    Pt expected to meet estimated nutrient needs: [x]Yes []No    NUTRITION DIAGNOSES:   Problem:  Inadequate oral intake      Etiology: related to poor appetite     Signs/Symptoms: as evidenced by refusal of lunch today      NUTRITION INTERVENTIONS:  Meals/Snacks: General/healthful diet   Supplements: Commercial supplement              GOAL:   PO intake >50% of meals/supplements next 2-4 days    LEARNING NEEDS (Diet, Food/Nutrient-Drug Interaction):    [x] None Identified   [] Identified and Education Provided/Documented   [] Identified and Pt declined/was not appropriate     Cultureal, Gnosticism, OR Ethnic Dietary Needs:    [x] None Identified   [] Identified and Addressed     [x] Interdisciplinary Care Plan Reviewed/Documented    [x] Discharge Planning:  CCD/low sodium diet     MONITORING /EVALUATION:   Food/Nutrient Intake Outcomes:  Total energy intake  Physical Signs/Symptoms Outcomes: Weight/weight change, Electrolyte and renal profile, GI profile, Glucose profile    NUTRITION RISK:    [] High              [x] Moderate           []  Low  []  Minimal/Uncompromised    PT SEEN FOR:    []  MD Consult: []Calorie Count      []Diabetic Diet Education        []Diet Education     []Electrolyte Management     []General Nutrition Management and Supplements     []Management of Tube Feeding     []TPN Recommendations    [x]  RN Referral:  [x]MST score >=2     []Enteral/Parenteral Nutrition PTA     []Pregnant: Gestational DM or Multigestation     []Pressure Ulcer/Wound Care needs        []  Low BMI  []  HUMBLE Jacobson Anderson  Pager [phone]                 Weekend Pager [phone]

## 2017-11-10 NOTE — PROGRESS NOTES
NAME: Fletcher Chacon. :  1940        MRN:  [id number]        Assessment :    Plan:  --BARBARA  Anasarca  Hyponatremia  DM 2  Hypoalbuminemia  Anemia  Hypotension  Metastatic RCCa - worsened  Acute on chronic systolic HF (EF 53-54%) --Creatinine slowly better (now 1.9) (a fair amount of fluctuation - as high as 3.2 on 10/27); ~ 500 mg proteinuria     hgb 8.1, will give epo 10 k sc weekly     Albumin 2.2; sodium improving (now 4)     Weight trending down     On Dobutamine; Lasix gtt (continue on 10 mg/day)      BP is marginal, but holding with lasix gtt (SBP 90 to 100)     I do not think Mr. Grey Kimble is a HD candidate b/c of his very poor EF and metastatic RCC    Restart flomax.     Will follow closely       Subjective:     Chief Complaint:  In bed eating breakfast. Denies difficulty with urination. States SOB and edema a bit better. We discussed the above. Review of Systems:    Symptom Y/N Comments  Symptom Y/N Comments   Fever/Chills    Chest Pain n    Poor Appetite y   Edema y    Cough    Abdominal Pain     Sputum    Joint Pain     SOB/WILSON y   Pruritis/Rash     Nausea/vomit n   Tolerating PT/OT     Diarrhea    Tolerating Diet     Constipation    Other       Could not obtain due to:      Objective:     VITALS:   Last 24hrs VS reviewed since prior progress note. Most recent are:  Visit Vitals    /44 (BP 1 Location: Left arm, BP Patient Position: At rest)    Pulse 70    Temp 97.8 °F (36.6 °C)    Resp 20    Ht 5' 9\" (1.753 m)    Wt 100.3 kg (221 lb 1.9 oz)    SpO2 93%    BMI 32.65 kg/m2       Intake/Output Summary (Last 24 hours) at 11/10/17 4131  Last data filed at 11/10/17 0456   Gross per 24 hour   Intake          1100.36 ml   Output             1815 ml   Net          -714.64 ml      Telemetry Reviewed:     PHYSICAL EXAM:  General: Ill appearing elderly gentleman, nad  Resp:  Basilar Rales. No access.  muscle use  CV:  Regular  rhythm,  No murmur (), No Rubs, No Gallops. +++ edema  GI:  Soft,  distended, Non tender.  +Bowel sounds, no HSM      Lab Data Reviewed: (see below)    Medications Reviewed: (see below)    PMH/SH reviewed - no change compared to H&P  ________________________________________________________________________  Care Plan discussed with:  Patient y    Family      RN     Care Manager                    Consultant:          Comments   >50% of visit spent in counseling and coordination of care       ________________________________________________________________________  Melissa Vidales MD     Procedures: see electronic medical records for all procedures/Xrays and details which  were not copied into this note but were reviewed prior to creation of Plan. LABS:  Recent Labs      11/10/17   0317  11/09/17   0324   WBC  28.9*  25.9*   HGB  8.4*  8.1*   HCT  26.8*  25.6*   PLT  311  317     Recent Labs      11/10/17   0317  11/09/17   0324  11/08/17   0425   NA  134*  133*  133*   K  3.7  4.1  4.1   CL  98  98  98   CO2  27  27  24   BUN  47*  54*  54*   CREA  1.88*  2.02*  2.11*   GLU  76  68  125*   CA  8.3*  8.2*  8.8   MG   --   2.1  2.1   PHOS   --    --   3.8     No results for input(s): SGOT, GPT, AP, TBIL, TP, ALB, GLOB, GGT, AML, LPSE in the last 72 hours. No lab exists for component: AMYP, HLPSE  No results for input(s): INR, PTP, APTT in the last 72 hours. No lab exists for component: INREXT, INREXT   No results for input(s): FE, TIBC, PSAT, FERR in the last 72 hours. No results found for: FOL, RBCF   No results for input(s): PH, PCO2, PO2 in the last 72 hours. No results for input(s): CPK, CKMB in the last 72 hours.     No lab exists for component: TROPONINI  No components found for: [place]  Lab Results   Component Value Date/Time    Color DARK YELLOW 11/07/2017 02:53 AM    Appearance CLOUDY 11/07/2017 02:53 AM    Specific gravity 1.016 11/07/2017 02:53 AM    pH (UA) 5.5 11/07/2017 02:53 AM    Protein TRACE 11/07/2017 02:53 AM    Glucose NEGATIVE  11/07/2017 02:53 AM    Ketone NEGATIVE  11/07/2017 02:53 AM    Bilirubin NEGATIVE  05/21/2017 10:35 PM    Urobilinogen 1.0 11/07/2017 02:53 AM    Nitrites NEGATIVE  11/07/2017 02:53 AM    Leukocyte Esterase MODERATE 11/07/2017 02:53 AM    Epithelial cells FEW 11/07/2017 02:53 AM    Bacteria NEGATIVE  11/07/2017 02:53 AM    WBC 5-10 11/07/2017 02:53 AM    RBC 5-10 11/07/2017 02:53 AM       MEDICATIONS:  Current Facility-Administered Medications   Medication Dose Route Frequency    DOBUTamine (DOBUTREX) 1,000 mg/250 mL (4,000 mcg/mL) infusion  0-10 mcg/kg/min IntraVENous TITRATE    prochlorperazine (COMPAZINE) with saline injection 10 mg  10 mg IntraVENous Q6H PRN    loperamide (IMODIUM) capsule 2 mg  2 mg Oral PRN    apixaban (ELIQUIS) tablet 5 mg  5 mg Oral BID    furosemide (LASIX) 10 mg/ml infusion  10 mg/hr IntraVENous CONTINUOUS    sodium chloride (NS) flush 5-10 mL  5-10 mL IntraVENous Q8H    sodium chloride (NS) flush 5-10 mL  5-10 mL IntraVENous PRN    acetaminophen (TYLENOL) tablet 650 mg  650 mg Oral Q4H PRN    ondansetron (ZOFRAN) injection 4 mg  4 mg IntraVENous Q4H PRN    bisacodyl (DULCOLAX) suppository 10 mg  10 mg Rectal DAILY PRN    amiodarone (CORDARONE) tablet 200 mg  200 mg Oral DAILY    insulin lispro (HUMALOG) injection   SubCUTAneous AC&HS    glucose chewable tablet 16 g  4 Tab Oral PRN    dextrose (D50W) injection syrg 12.5-25 g  12.5-25 g IntraVENous PRN    glucagon (GLUCAGEN) injection 1 mg  1 mg IntraMUSCular PRN    insulin lispro (HUMALOG) injection 5 Units  5 Units SubCUTAneous TIDAC    insulin glargine (LANTUS) injection 20 Units  20 Units SubCUTAneous DAILY

## 2017-11-10 NOTE — ACP (ADVANCE CARE PLANNING)
Palliative LCSW met with Pt in presence of his sister (melissa) and a male relative about AMD. Pt only wanted to complete the health care agent appointment section and appoint his brother, [facility] and no successor. AMD completed and original and copy given to Pt. No questions asked. Informed Pt he may update at anytime.

## 2017-11-10 NOTE — PROGRESS NOTES
Spoke with patient and his brother and informed them the uniform assessment instrument has been submitted and successfully completed. Copy mailed to [facility]uk[facility] Olivo and they will be following up with the family.

## 2017-11-10 NOTE — PROGRESS NOTES
[address] [address] [address] [facility]  [phone]      Cardiology Progress Note      11/10/2017 1130AM    Admit Date: 11/6/2017    Admit Diagnosis:   Heart failure (HCC)    Subjective:     Melanie Mcleod is a 78 y.o. 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 Overnight events:  -VSS  - creat down to 1.88;   -weight shows 5# gain, but patient was on standing scale today and bed scales previously, so unable to accurately compare. I/O neg 1L yesterday  -Mr. [address] states he's feeling fairly well today. He wants to get out of the bed and is waiting for the nurse. He denies pain. His breathing is feeling steady and he denies palpitations.       Visit Vitals    /44 (BP 1 Location: Left arm, BP Patient Position: At rest)    Pulse 70    Temp 97.8 °F (36.6 °C)    Resp 20    Ht 5' 9\" (1.753 m)    Wt 100.3 kg (221 lb 1.9 oz)    SpO2 95%    BMI 32.65 kg/m2       Current Facility-Administered Medications   Medication Dose Route Frequency    tamsulosin (FLOMAX) capsule 0.4 mg  0.4 mg Oral QHS    insulin glargine (LANTUS) injection 15 Units  15 Units SubCUTAneous DAILY    DOBUTamine (DOBUTREX) 1,000 mg/250 mL (4,000 mcg/mL) infusion  0-10 mcg/kg/min IntraVENous TITRATE    prochlorperazine (COMPAZINE) with saline injection 10 mg  10 mg IntraVENous Q6H PRN    loperamide (IMODIUM) capsule 2 mg  2 mg Oral PRN    apixaban (ELIQUIS) tablet 5 mg  5 mg Oral BID    furosemide (LASIX) 10 mg/ml infusion  10 mg/hr IntraVENous CONTINUOUS    sodium chloride (NS) flush 5-10 mL  5-10 mL IntraVENous Q8H    sodium chloride (NS) flush 5-10 mL  5-10 mL IntraVENous PRN    acetaminophen (TYLENOL) tablet 650 mg  650 mg Oral Q4H PRN    ondansetron (ZOFRAN) injection 4 mg  4 mg IntraVENous Q4H PRN    bisacodyl (DULCOLAX) suppository 10 mg  10 mg Rectal DAILY PRN    amiodarone (CORDARONE) tablet 200 mg  200 mg Oral DAILY    insulin lispro (HUMALOG) injection   SubCUTAneous AC&HS    glucose chewable tablet 16 g  4 Tab Oral PRN    dextrose (D50W) injection syrg 12.5-25 g  12.5-25 g IntraVENous PRN    glucagon (GLUCAGEN) injection 1 mg  1 mg IntraMUSCular PRN       Objective:      Physical Exam:  General: pleasant, obese AAM resting in bed in NAD  Heart: RRR, 3/6 systolic murmur  Lungs: clear/dim   Abdomen: Soft, +BS, NTND   Extremities: LE rajan +DP/PT, 2+ pitting edema to waist BLE; trace pitting edema BUE   Neurologic: Grossly normal  Skin:  Warm and dry.     Data Review:   Recent Labs      11/10/17   0317  11/09/17   0324  11/08/17   0425   WBC  28.9*  25.9*  28.1*   HGB  8.4*  8.1*  8.1*   HCT  26.8*  25.6*  24.5*   PLT  311  317  296     Recent Labs      11/10/17   0317  11/09/17   0324  11/08/17   0425   NA  134*  133*  133*   K  3.7  4.1  4.1   CL  98  98  98   CO2  27  27  24   GLU  76  68  125*   BUN  47*  54*  54*   CREA  1.88*  2.02*  2.11*   CA  8.3*  8.2*  8.8   MG   --   2.1  2.1   PHOS   --    --   3.8       No results for input(s): TROIQ, CPK, CKMB in the last 72 hours. Intake/Output Summary (Last 24 hours) at 11/10/17 1234  Last data filed at 11/10/17 0648   Gross per 24 hour   Intake          1491.72 ml   Output             2465 ml   Net          -973.28 ml        Telemetry: AV paced  ECG: v-paced   Echocardiogram: EF 40-45%; mod diffuse hypokinesis; LA dilation; mild MR; mod TR; mod PHTN  CXRAY:small R effusion     Assessment:     Active Problems:    Chronic systolic heart failure ([facility] (4/27/2012)      DM (diabetes mellitus) ([facility] (4/27/2012)      A-fib ([facility] (12/17/2015)      Metastatic cancer ([address] (11/6/2017)      CKD (chronic kidney disease) (11/6/2017)      Hyponatremia (11/6/2017)        Plan:     Chronic systolic heart failure: presented for worsening peripheral edema after being off diuretics for two weeks.   Admission weight almost 20# above his regular home weight. With hyponatremia, hypotension, CKD, low albumin on admission. Repeat ECHO with improved EF, however, patient was on dobutamine during exam.  · Creatinine and diuresis continue to improve, however, remains on high dose dobutamine. · Hyponatremia improving with diuresis  · BB and ACI on hold  · Discussed weaning dobutamine with RN again. · Palliative following  · Poor prognosis if unable to wean down/off his dobutamine. A-fib:  Currently AV pacing  · Continue eliquis and amio      BARBARA:  Creatinine improving slightly  · On dobutamine and lasix as above  · Nephrology following:  See that patient is not a likely HD candidate        Poor prognosis. Patient is DNR with palliative following.        Hood Aguilar, JIMMY  DNP, RN, AGACNP-BC

## 2017-11-10 NOTE — PROGRESS NOTES
Hematology Oncology Progress Note         Follow up for: metastatic RCC     Chart notes reviewed since last visit. Case discussed with following: patient.  at bedside. Patient complains of the following: doing better at present. Nausea cleared up. No more diarrhea. Additional concerns noted by the staff:     Patient Vitals for the past 24 hrs:   BP Temp Pulse Resp SpO2 Height Weight   11/10/17 1600 97/43 97.7 °F (36.5 °C) 71 20 - - -   11/10/17 1208 (!) 98/39 97.8 °F (36.6 °C) 73 20 - - -   11/10/17 0700 102/44 97.8 °F (36.6 °C) 70 20 95 % - -   11/10/17 0400 101/44 97.8 °F (36.6 °C) 70 20 93 % - -   11/10/17 0200 107/40 - 70 - 97 % - -   11/10/17 0030 110/43 - 70 - 95 % - -   11/10/17 0001 108/44 - 71 - 93 % - -   11/09/17 2330 102/44 - 70 - 95 % - -   11/09/17 2300 (!) 114/37 97.8 °F (36.6 °C) 70 - 98 % - -   11/09/17 2200 105/43 - 72 - 98 % - -   11/09/17 2130 106/46 - 74 - 94 % - -   11/09/17 2036 - - - - - 5' 9\" (1.753 m) 100.3 kg (221 lb 1.9 oz)   11/09/17 1921 (!) 98/35 97.7 °F (36.5 °C) 75 20 96 % - -     ROS negative for 11 organ systems except as noted. Physical Examination:  Constitutional Alert, cooperative, oriented. Mood and affect appropriate. Appears close to chronological age. Well nourished. Well developed. Head Normocephalic; no scars   Eyes Conjunctivae and sclerae are clear and without icterus. Pupils are reactive and equal.   ENMT Sinuses are nontender. No oral exudates, ulcers, masses, thrush or mucositis. Oropharynx clear. Tongue normal.   Neck Supple without masses or thyromegaly. No jugular venous distension. Hematologic/Lymphatic No petechiae or purpura. No tender or palpable lymph nodes noted   Respiratory Lungs are clear to auscultation   Cardiovascular Regular rate and rhythm of heart without murmurs, gallops or rubs. Chest / Line Site Chest is symmetric with no chest wall deformities.    Abdomen Non-tender, non-distended, no masses, ascites or hepatosplenomegaly. Good bowel sounds. No guarding or rebound tenderness. Musculoskeletal No tenderness or swelling, normal range of motion without obvious weakness. Extremities No visible deformities, no cyanosis, clubbing or edema. Skin No rashes, scars, or lesions suggestive of malignancy. No petechiae, purpura, or ecchymoses. No excoriations. Neurologic No sensory or motor deficits noted but not specifically tested. Sitting up in wheelchair comfortably. Psychiatric Alert and oriented. Coherent speech. Verbalizes understanding of our discussions today.        Labs:  Recent Results (from the past 24 hour(s))   GLUCOSE, POC    Collection Time: 11/09/17 10:08 PM   Result Value Ref Range    Glucose (POC) 63 (L) 65 - 100 mg/dL    Performed by Wendy Valentine    GLUCOSE, POC    Collection Time: 11/09/17 11:28 PM   Result Value Ref Range    Glucose (POC) 66 65 - 100 mg/dL    Performed by Wendy Valentine    GLUCOSE, POC    Collection Time: 11/10/17 12:31 AM   Result Value Ref Range    Glucose (POC) 89 65 - 100 mg/dL    Performed by [email or web address] Valentine    GLUCOSE, POC    Collection Time: 11/10/17  1:38 AM   Result Value Ref Range    Glucose (POC) 85 65 - 100 mg/dL    Performed by [address] [place], [place] [address], BASIC    Collection Time: 11/10/17  3:17 AM   Result Value Ref Range    Sodium 134 (L) 136 - 145 mmol/L    Potassium 3.7 3.5 - 5.1 mmol/L    Chloride 98 97 - 108 mmol/L    CO2 27 21 - 32 mmol/L    Anion gap 9 5 - 15 mmol/L    Glucose 76 65 - 100 mg/dL    BUN 47 (H) 6 - 20 MG/DL    Creatinine 1.88 (H) 0.70 - 1.30 MG/DL    BUN/Creatinine ratio 25 (H) 12 - 20      GFR est AA 43 (L) >60 ml/min/1.73m2    GFR est non-AA 35 (L) >60 ml/min/1.73m2    Calcium 8.3 (L) 8.5 - 10.1 MG/DL   CBC WITH AUTOMATED DIFF    Collection Time: 11/10/17  3:17 AM   Result Value Ref Range    WBC 28.9 (H) 4.1 - 11.1 K/uL    RBC 3.45 (L) 4.10 - 5.70 M/uL    HGB 8.4 (L) 12.1 - 17.0 g/dL    HCT 26.8 (L) 36.6 - 50.3 %    MCV 77.7 (L) 80.0 - 99.0 FL    MCH 24.3 (L) 26.0 - 34.0 PG    MCHC 31.3 30.0 - 36.5 g/dL    RDW 17.6 (H) 11.5 - 14.5 %    PLATELET 221 328 - 307 K/uL    NEUTROPHILS 92 (H) 32 - 75 %    LYMPHOCYTES 4 (L) 12 - 49 %    MONOCYTES 4 (L) 5 - 13 %    EOSINOPHILS 0 0 - 7 %    BASOPHILS 0 0 - 1 %    ABS. NEUTROPHILS 26.5 (H) 1.8 - 8.0 K/UL    ABS. LYMPHOCYTES 1.2 0.8 - 3.5 K/UL    ABS. MONOCYTES 1.2 (H) 0.0 - 1.0 K/UL    ABS. EOSINOPHILS 0.0 0.0 - 0.4 K/UL    ABS. BASOPHILS 0.0 0.0 - 0.1 K/UL    RBC COMMENTS ANISOCYTOSIS  1+        RBC COMMENTS MICROCYTOSIS  1+        RBC COMMENTS HYPOCHROMIA  1+        WBC COMMENTS TOXIC GRANULATION      DF SMEAR SCANNED     GLUCOSE, POC    Collection Time: 11/10/17  7:56 AM   Result Value Ref Range    Glucose (POC) 96 65 - 100 mg/dL    Performed by [facility], POC    Collection Time: 11/10/17 11:37 AM   Result Value Ref Range    Glucose (POC) 131 (H) 65 - 100 mg/dL    Performed by [address] [place] [address], POC    Collection Time: 11/10/17  4:38 PM   Result Value Ref Range    Glucose (POC) 187 (H) 65 - 100 mg/dL    Performed by Edvin Voss          Assessment and Plan:   Metastatic RCC - had evidence of progressive disease on last scan. Dr. Gideon Camarillo is trying to get cabozantinib approved for him. The opdivo (nivolumab) is being stopped for lack of significant efficacy. Have read earlier notes and appreciate conundrum of his poor cardiac status and overall poor prognosis relative to his progressive cancer. We can certainly refer to hospice as outpatient if he does not improve but if he does, can ponder a new oral molecularly targeted therapy. Acute on chronic systolic heart failure - on dobutamine    Has BIV-ICD    Type 2 DM    Anemia with prior GI bleed. Off heparin for now given drop in hgb. Nausea -  added compazine to regimen. May need to increase zofran from 4 mg to 8 mg if he does not continue to respond.      Diarrhea - try immodium

## 2017-11-10 NOTE — PROGRESS NOTES
PCU SHIFT NURSING NOTE      Bedside and Verbal shift change report given to Lona Patel RN (oncoming nurse) by Nica Monson RN (offgoing nurse). Report included the following information SBAR, Kardex, Accordion and Recent Results. Shift Summary: Pt received resting in bed; A & O x 4; room air with no acute distress noted. Admission Date 11/6/2017   Admission Diagnosis Heart failure ([facility] [address]   Consults IP CONSULT TO CARDIOLOGY  IP CONSULT TO HEMATOLOGY  IP CONSULT TO PALLIATIVE CARE - PROVIDER  IP CONSULT TO GASTROENTEROLOGY  IP CONSULT TO UROLOGY  IP CONSULT TO NEPHROLOGY        Consults   []PT   []OT   []Speech   []Case Management      [] Palliative      Cardiac Monitoring Order   []Yes   []No     IV drips   []Yes    Drip:                            Dose:  Drip:                            Dose:  Drip:                            Dose:   []No     GI Prophylaxis   []Yes   []No         DVT Prophylaxis   SCDs:  Sequential Compression Device: Bilateral     Patient Refused VTE Prophylaxis: Yes    Maycol stockings:  Graduated Compression Stockings: Bilateral      [] Medication   []Contraindicated   []None      Activity Level Activity Level: Up with Assistance     Activity Assistance: Partial (one person)   Purposeful Rounding every 1-2 hour? []Yes   Abernathy Score  Total Score: 3   Bed Alarm (If score 3 or >)   []Yes   [] Refused (See signed refusal form in chart)   Panfilo Score  Panfilo Score: 16   Panfilo Score (if score 14 or less)   []PMT consult   []Wound Care consult      []Specialty bed   [] Nutrition consult          Needs prior to discharge:   Home O2 required:    []Yes   []No    If yes, how much O2 required?     Other:    Last Bowel Movement: Last Bowel Movement Date: 11/08/17      Influenza Vaccine Received Flu Vaccine for Current Season (usually Sept-March): Yes        Pneumonia Vaccine           Diet Active Orders   Diet    DIET DIABETIC CONSISTENT CARB Regular; 2 GM NA (House Low NA); FR 1500ML      LDAs Peripheral IV 11/07/17 Right (Active)   Site Assessment Clean, dry, & intact 11/10/2017  3:00 AM   Phlebitis Assessment 0 11/10/2017  3:00 AM   Infiltration Assessment 0 11/10/2017  3:00 AM   Dressing Status Clean, dry, & intact 11/10/2017  3:00 AM   Dressing Type Tape;Transparent 11/10/2017  3:00 AM   Hub Color/Line Status Infusing 11/10/2017  3:00 AM   Action Taken Open ports on tubing capped 11/10/2017  3:00 AM   Alcohol Cap Used Yes 11/10/2017  3:00 AM       Peripheral IV 11/10/17 Left; Anterior Forearm (Active)   Site Assessment Clean, dry, & intact 11/10/2017  3:00 AM   Phlebitis Assessment 0 11/10/2017  3:00 AM   Infiltration Assessment 0 11/10/2017  3:00 AM   Dressing Status Clean, dry, & intact 11/10/2017  3:00 AM   Dressing Type Tape 11/10/2017  3:00 AM   Hub Color/Line Status Pink; Infusing;Capped 11/10/2017  3:00 AM   Action Taken Blood drawn;Dressing changed; Open ports on tubing capped 11/10/2017  3:00 AM   Alcohol Cap Used Yes 11/10/2017  3:00 AM                      Urinary Catheter Urinary Catheter 11/06/17 Coude-Criteria for Appropriate Use: Medically/surgically unstable, Strict I/Os    Intake & Output   Date 11/09/17 0700 - 11/10/17 0659 11/10/17 0700 - 11/11/17 0659   Shift [id number] 1900-0659 24 Hour Total 0700-1859 1900-0659 24 Hour Total   I  N  T  A  K  E   P. O.  720 720         P. O.  720 720       I.V.  (mL/kg/hr) 573.2  (0.5) 198.5  (0.2) 771.7  (0.3)         Lasix Volume 36 11.8 47.8         DOBUTamine Volume 537.2 186.7 723.9       Shift Total  (mL/kg) 573.2  (5.8) 918.5  (9.2) 1491.7  (14.9)      O  U  T  P  U  T   Urine  (mL/kg/hr) 1000  (0.8) 1465  (1.2) 2465  (1)         Urine Output (mL) (Urinary Catheter 11/06/17 Coude) 1000 1465 2465       Stool            Stool Occurrence(s) 2 x  2 x       Shift Total  (mL/kg) 1000  (10.2) 1465  (14.6) 2465  (24.6)      NET -426.8 -546.5 -973.3      Weight (kg) 98.2 100.3 100.3 100.3 100.3 100.3         Readmission Risk Assessment Tool Score High Risk            23       Total Score        5 Pt. Coverage (Medicare=5 , Medicaid, or Self-Pay=4)    18 Charlson Comorbidity Score (Age + Comorbid Conditions)        Criteria that do not apply:    Has Seen PCP in Last 6 Months (Yes=3, No=0)    . Living with Significant Other. Assisted Living. LTAC. SNF.  or   Rehab    Patient Length of Stay (>5 days = 3)    IP Visits Last 12 Months (1-3=4, 4=9, >4=11)       Expected Length of Stay 4d 12h   Actual Length of Stay 4

## 2017-11-10 NOTE — CDMP QUERY
Account Number: [de-identified]  MRN: [id number]  Patient: Xavier Stinson   Created: [id number]  Clinician Name: Emerson Cohen RN, CCDS   Dr. Marely Samaniego :  Please clarify if this patient is being treated/managed for:    => Malnutrition, moderate in the setting of mets renal CA, HF, CM w/ poor appetite  =>Other Explanation of clinical findings  =>Unable to Determine (no explanation of clinical findings)    The medical record reflects the following clinical findings, treatment, and risk factors:    Risk Factors: 77yoM w/ CKD, metastatic renal cell carcinoma, heart failure, cardiomyopathy, AFIB, HTN, and DM  Clinical Indicators: RD eval note \"Meets criteria for Acute Moderate Malnutrition                 Moderate-severe LE edema   Nutritional intake <75% of recommended intake for >1 week\"  Treatment: RD recommendations & follow up, nutrition supplements      Please clarify and document your clinical opinion in the progress notes and discharge summary including the definitive and/or presumptive diagnosis, (suspected or probable), related to the above clinical findings. Please include clinical findings supporting your diagnosis.     Thank Ousmane Montalvo RN, CCDS

## 2017-11-10 NOTE — CARDIO/PULMONARY
C/P Rehab Note:    Chart reviewed and pt visited. Pt 79 yo admitting diagnoses: acute on chronic systolic HF, s/p BIV-ICD, ICM, a-fib, EF 15-20%, renal CA with mets, former tobacco use.        This was a follow-up visit to answer questions and reinforce prior teaching re: CHF, S&Ss, medication management, Low NA diet, daily weights, when to call the doctor and balancing rest/activity. Patient understands teaching and has no questions at this time. Will continue to follow.

## 2017-11-10 NOTE — PROGRESS NOTES
Palliative Medicine     Received call from Pt's brother/mpoa Iman Arvizu) requesting family meeting with Palliative Medicine to discuss option of home with hospice. Pt's sister/where pt resides (vera) and Pt's brother/deacon (iván) will also be in meeting. LCSW recommended hospice information session but Homer would like to meet with Palliative first. Explained if decision is home with hospice then would have hospice agency meet with them to provide info about services and coordinate dc dme items and plan with CM. LCSW spoke with Dr. Genaro Hearn MD and CM. Addendum 15:30: LCSW met with Pt's brother/mpoa (Homer), Pt's brother Luisito Higgins) and Pt's sister/where Pt lives(Niki) and answered their questions as they are thinking through options for Pt. Pt's brother said Pt wants to use hospice services. All family is on board with home with hospice and expect the next step is to meet with hospice agency about their services and explore what DME would be needed in home. They are working with CM and DSS to apply for [facility] [address] Spoke with RN and lm for CM. Plan:  Palliative SW meet with Pt's family to answer goc questions. Pt and family want Pt to go home with hospice. Left msg for CM and Palliative provider placed order. AMd done  DDNR order completed. Thank you for including Palliative Medicine in Mr. Hernandez's care.       Mark Wiley, [address] ([id number])  Work cell: [phone]

## 2017-11-11 NOTE — PROGRESS NOTES
PCU SHIFT NURSING NOTE      Bedside shift change report given to Ave Hinojosa RN (oncoming nurse) by Shannan Khanna RN (offgoing nurse). Report included the following information SBAR, Kardex, Intake/Output, Recent Results and Cardiac Rhythm Paced . Shift Summary:   1915: Bedside report given to night shift nurse. No complaints of pain   1945: Assessment complete. Pt alert and oriented. Open area noted to sacrum. Pt turned and repositioned   2009: On call paged. Spoke with Dr. Monserrat Arriaga regarding pts low blood pressure. Order to hold lasix for 1hr and recheck blood pressure   2050: On call paged. Dr. Ke Valdes made aware of pts blood pressures. Orders to continue to hold lasix drip and recheck blood pressure in 1hr. Melatonin 3mg ordered   2236: On call paged. Dr. Ubaldo Dia aware of pts pressures. Orders received   1388: On call paged. Dr. Ubaldo Dia aware of pts pressures. Orders to hold lasix drip for 4 hours and restart   0340: Lasix drip restarted per physician order  8008: No changes to previous assessment   0715: Report given to [facility]. Call bell in reach         Admission Date 11/6/2017   Admission Diagnosis Heart failure ([address]   Consults IP CONSULT TO CARDIOLOGY  IP CONSULT TO HEMATOLOGY  IP CONSULT TO PALLIATIVE CARE - PROVIDER  IP CONSULT TO GASTROENTEROLOGY  IP CONSULT TO UROLOGY  IP CONSULT TO NEPHROLOGY        Consults   []PT   []OT   []Speech   []Case Management      [] Palliative      Cardiac Monitoring Order   []Yes   []No     IV drips   []Yes    Drip:                            Dose:  Drip:                            Dose:  Drip:                            Dose:   []No     GI Prophylaxis   []Yes   []No         DVT Prophylaxis   SCDs:  Sequential Compression Device: Bilateral     Patient Refused VTE Prophylaxis: Yes    Maycol stockings:  Graduated Compression Stockings: Bilateral      [] Medication   []Contraindicated   []None      Activity Level Activity Level:  Up with Assistance     Activity Assistance: Partial (one person)   Purposeful Rounding every 1-2 hour? []Yes   Abernathy Score  Total Score: 3   Bed Alarm (If score 3 or >)   []Yes   [] Refused (See signed refusal form in chart)   Panfilo Score  Panfilo Score: 16   Panfilo Score (if score 14 or less)   []PMT consult   []Wound Care consult      []Specialty bed   [] Nutrition consult          Needs prior to discharge:   Home O2 required:    []Yes   []No    If yes, how much O2 required? Other:    Last Bowel Movement: Last Bowel Movement Date: 11/08/17      Influenza Vaccine Received Flu Vaccine for Current Season (usually Sept-March): Yes        Pneumonia Vaccine           Diet Active Orders   Diet    DIET DIABETIC CONSISTENT CARB Regular; 2 GM NA (House Low NA); FR 1500ML      LDAs               Peripheral IV 11/07/17 Right (Active)   Site Assessment Clean, dry, & intact 11/10/2017  3:00 AM   Phlebitis Assessment 0 11/10/2017  3:00 AM   Infiltration Assessment 0 11/10/2017  3:00 AM   Dressing Status Clean, dry, & intact 11/10/2017  3:00 AM   Dressing Type Tape;Transparent 11/10/2017  3:00 AM   Hub Color/Line Status Infusing 11/10/2017  3:00 AM   Action Taken Open ports on tubing capped 11/10/2017  3:00 AM   Alcohol Cap Used Yes 11/10/2017  3:00 AM       Peripheral IV 11/10/17 Left; Anterior Forearm (Active)   Site Assessment Clean, dry, & intact 11/10/2017  3:00 AM   Phlebitis Assessment 0 11/10/2017  3:00 AM   Infiltration Assessment 0 11/10/2017  3:00 AM   Dressing Status Clean, dry, & intact 11/10/2017  3:00 AM   Dressing Type Tape 11/10/2017  3:00 AM   Hub Color/Line Status Pink; Infusing;Capped 11/10/2017  3:00 AM   Action Taken Blood drawn;Dressing changed; Open ports on tubing capped 11/10/2017  3:00 AM   Alcohol Cap Used Yes 11/10/2017  3:00 AM                      Urinary Catheter Urinary Catheter 11/06/17 Coude-Criteria for Appropriate Use: Medically/surgically unstable, Strict I/Os    Intake & Output   Date 11/09/17 1900 - 11/10/17 0659 11/10/17 0700 - 11/11/17 P.O. [place] [address] 5156-8600 24 Hour Total 2139-3106 0958-8842 24 Hour Total   I  N  T  A  K  E   P. O. 720 720         P. O. 720 720       I.V.  (mL/kg/hr) 198.5 771.7         Lasix Volume 11.8 47.8         DOBUTamine Volume 186.7 723.9       Shift Total  (mL/kg) 918.5  (9.2) 1491.7  (14.9)      O  U  T  P  U  T   Urine  (mL/kg/hr) 1465 2465 800  (0.7)  800      Urine Output (mL) (Urinary Catheter 11/06/17 Coude) 1465 2465 800  800    Stool           Stool Occurrence(s)  2 x       Shift Total  (mL/kg) 1465  (14.6) 2465  (24.6) 800  (8)  800  (8)   NET -546.5 -973.3 -800  -800   Weight (kg) 100.3 100.3 100.3 100.3 100.3         Readmission Risk Assessment Tool Score High Risk            23       Total Score        5 Pt. Coverage (Medicare=5 , Medicaid, or Self-Pay=4)    18 Charlson Comorbidity Score (Age + Comorbid Conditions)        Criteria that do not apply:    Has Seen PCP in Last 6 Months (Yes=3, No=0)    . Living with Significant Other. Assisted Living. LTAC. SNF.  or   Rehab    Patient Length of Stay (>5 days = 3)    IP Visits Last 12 Months (1-3=4, 4=9, >4=11)       Expected Length of Stay 4d 12h   Actual Length of Stay 4

## 2017-11-11 NOTE — PROGRESS NOTES
Hospitalist Progress Note    NAME: Yon Chavez. :  1940   MRN:  [id number]       Assessment / Plan:  Acute on chronic systolic heart failure s/p BIV-ICD  Ischemic cardiomyopathy  Persistent Atrial fibrillation  Hypotension   Hyponatremia, likely due to vol overload, monitor   CKD 4  Oligouric, improved   -diuretic discontinued 2 weeks PTA due to worsening of renal failure  -cxr showed pulmonary edema with small R pleural effusion, trop neg, probnp 30K  -last Echo showed EF15-20%, repeat Echo EF improved to 40-45% while on dobutamine gtt.   -pt hypotensive overnight, was given 250c NS bolus and lasix gtt held for several hrs, now re-started. He also received 250cc bolus of NS due to hypotension  -cont' dobutamin gtt while on lasix gtt as above-pt is not a candidate for HD given metastatic CA with poor cardiomyopathy with poor EF  -palliative consultation appreciated, likely home with hospice on Monday  -nephrology/cardiology consultation appreciated     Metastatic hypernephroma to bone, possibly nodes in the chest   Leukocytosis   -follows by Dr Karely Peacock   -appreciate oncology following    T2DM  -SSI, cont' lantus, titrate prn    Anemia  Hx of GI bleed  -no evidence of bleed  -monitor     Code Status: full  Surrogate Decision Maker: pt's brother  DVT Prophylaxis: Eliquis  GI Prophylaxis: not indicated     Subjective:     Chief Complaint / Reason for Physician Visit  Pt seen at bedside, no acute complaints. Discussed with RN events overnight. Review of Systems:  Symptom Y/N Comments  Symptom Y/N Comments   Fever/Chills n   Chest Pain n    Poor Appetite    Edema y    Cough    Abdominal Pain     Sputum    Joint Pain     SOB/WILSON n   Pruritis/Rash     Nausea/vomit    Tolerating PT/OT     Diarrhea    Tolerating Diet y    Constipation    Other       Could NOT obtain due to:      Objective:     VITALS:   Last 24hrs VS reviewed since prior progress note.  Most recent are:  Patient Vitals for the past 24 hrs:   Temp Pulse Resp BP SpO2   11/11/17 0700 98.3 °F (36.8 °C) 84 20 (!) 108/38 93 %   11/11/17 0600 - 84 - 120/43 94 %   11/11/17 0530 - 81 - (!) 114/32 94 %   11/11/17 0417 - 79 - (!) 115/34 95 %   11/11/17 0352 - 77 20 (!) 112/34 -   11/11/17 0245 98.2 °F (36.8 °C) 79 20 (!) 106/31 95 %   11/11/17 0200 - 78 - (!) 111/32 95 %   11/11/17 0119 - 78 - (!) 106/34 95 %   11/11/17 0030 - 75 - (!) 111/39 94 %   11/11/17 0028 - 75 - (!) 110/32 96 %   11/11/17 0001 - 75 - (!) 102/36 96 %   11/10/17 2330 - 74 - (!) 103/34 95 %   11/10/17 2314 98.3 °F (36.8 °C) 76 20 107/41 96 %   11/10/17 2300 - 74 - (!) 99/32 94 %   11/10/17 2247 - 74 - (!) 99/38 96 %   11/10/17 2225 - 75 - (!) 80/37 96 %   11/10/17 2200 - 71 - (!) 82/34 95 %   11/10/17 2130 - 77 - (!) 95/32 96 %   11/10/17 2100 - 76 - (!) 93/36 95 %   11/10/17 2005 - 77 - (!) 91/24 97 %   11/1940 - 74 - (!) 100/38 95 %   11/10/17 1931 - 77 - (!) 97/37 95 %   11/10/17 1921 - 77 - (!) 99/38 96 %   11/10/17 1913 97.8 °F (36.6 °C) 78 20 (!) 103/32 98 %   11/10/17 1600 97.7 °F (36.5 °C) 71 20 97/43 -   11/10/17 1208 97.8 °F (36.6 °C) 73 20 (!) 98/39 -       Intake/Output Summary (Last 24 hours) at 11/11/17 0818  Last data filed at 11/11/17 0559   Gross per 24 hour   Intake                0 ml   Output             1850 ml   Net            -1850 ml        PHYSICAL EXAM:  General: WD, WN. Alert, cooperative, no acute distress    EENT:  EOMI. Anicteric sclerae. MMM  Resp:  CTA bilaterally, no wheezing or rales. No accessory muscle use  CV:  Regular  rhythm,  +2 b/l LE edema  GI:  Soft, Non distended, Non tender.  +BS  Neurologic:  Alert and oriented X 3, normal speech  Psych:   Good insight. Not anxious nor agitated  Skin:  No rashes.   No jaundice    Reviewed most current lab test results and cultures  YES  Reviewed most current radiology test results   YES  Review and summation of old records today    NO  Reviewed patient's current orders and MAR    YES  PMH/SH reviewed - no change compared to H&P  ________________________________________________________________________  Care Plan discussed with:    Comments   Patient x    Family      RN x    Care Manager     Consultant  x Nephro                     Multidiciplinary team rounds were held today with , nursing, pharmacist and clinical coordinator. Patient's plan of care was discussed; medications were reviewed and discharge planning was addressed. ________________________________________________________________________  Total NON critical care TIME:  35   Minutes    Total CRITICAL CARE TIME Spent:   Minutes non procedure based      Comments   >50% of visit spent in counseling and coordination of care     ________________________________________________________________________  Ray Boyce MD     Procedures: see electronic medical records for all procedures/Xrays and details which were not copied into this note but were reviewed prior to creation of Plan. LABS:  I reviewed today's most current labs and imaging studies.   Pertinent labs include:  Recent Labs      11/11/17   0215  11/10/17   0317  11/09/17   0324   WBC  30.6*  28.9*  25.9*   HGB  8.9*  8.4*  8.1*   HCT  28.6*  26.8*  25.6*   PLT  304  311  317     Recent Labs      11/11/17   0215  11/10/17   0317  11/09/17   0324   NA  131*  134*  133*   K  3.6  3.7  4.1   CL  96*  98  98   CO2  24  27  27   GLU  142*  76  68   BUN  44*  47*  54*   CREA  1.90*  1.88*  2.02*   CA  8.0*  8.3*  8.2*   MG   --    --   2.1   ALB  1.8*   --    --    TBILI  2.6*   --    --    SGOT  23   --    --    ALT  14   --    --        Signed: Ray Boyce MD

## 2017-11-11 NOTE — PROGRESS NOTES
NAME: Ward Shirley. :  1940        MRN:  [id number]        Assessment :    Plan:  --BARBARA  Anasarca  Hyponatremia  DM 2  Hypoalbuminemia  Anemia  Hypotension  Metastatic RCCa - worsened  Acute on chronic systolic HF (EF 25-26%) --Creatinine stable (now 1.9)     hgb 8.9, epo 10 k sc weekly     Albumin 1.8    Sodium low 130's     Weight trending down and edema has improved    On Dobutamine; Lasix gtt (continue on 10 mg/day)      I do not think Mr. Merlin Dike is a HD candidate b/c of his very poor EF and metastatic RCC    Sherrill d/yolie flomax (may contribute to low BP).    He is to go home on hospice. Will continue Lasix gtt for now as he seems more comfortable with diuresis. Subjective:     Chief Complaint:  In bed talking on phone. States SOB and edema a bit better again. We discussed the above. Review of Systems:    Symptom Y/N Comments  Symptom Y/N Comments   Fever/Chills    Chest Pain n    Poor Appetite y   Edema y    Cough    Abdominal Pain     Sputum    Joint Pain     SOB/WILSON y   Pruritis/Rash     Nausea/vomit n   Tolerating PT/OT     Diarrhea    Tolerating Diet     Constipation    Other       Could not obtain due to:      Objective:     VITALS:   Last 24hrs VS reviewed since prior progress note. Most recent are:  Visit Vitals    BP (!) 108/38 (BP 1 Location: Right arm, BP Patient Position: At rest)    Pulse 84    Temp 98.3 °F (36.8 °C)    Resp 20    Ht 5' 9\" (1.753 m)    Wt 117.9 kg (260 lb)    SpO2 93%    BMI 38.4 kg/m2       Intake/Output Summary (Last 24 hours) at 17 0820  Last data filed at 17 05   Gross per 24 hour   Intake                0 ml   Output             1850 ml   Net            -1850 ml      Telemetry Reviewed:     PHYSICAL EXAM:  General: Ill appearing elderly gentleman, nad  Resp:  cta rajan, ant. No access.  muscle use  CV:  Regular  rhythm,  No murmur (), No Rubs, No Gallops. ++ edema (better)  GI:  Soft,  distended, Non tender.  +Bowel sounds, no HSM      Lab Data Reviewed: (see below)    Medications Reviewed: (see below)    PMH/SH reviewed - no change compared to H&P  ________________________________________________________________________  Care Plan discussed with:  Patient y    Family      RN     Care Manager                    Consultant:          Comments   >50% of visit spent in counseling and coordination of care       ________________________________________________________________________  Ender Amor MD     Procedures: see electronic medical records for all procedures/Xrays and details which  were not copied into this note but were reviewed prior to creation of Plan. LABS:  Recent Labs      11/11/17   0215  11/10/17   0317   WBC  30.6*  28.9*   HGB  8.9*  8.4*   HCT  28.6*  26.8*   PLT  304  311     Recent Labs      11/11/17   0215  11/10/17   0317  11/09/17   0324   NA  131*  134*  133*   K  3.6  3.7  4.1   CL  96*  98  98   CO2  24  27  27   BUN  44*  47*  54*   CREA  1.90*  1.88*  2.02*   GLU  142*  76  68   CA  8.0*  8.3*  8.2*   MG   --    --   2.1     Recent Labs      11/11/17 0215   SGOT  23   AP  261*   TP  5.5*   ALB  1.8*   GLOB  3.7     No results for input(s): INR, PTP, APTT in the last 72 hours. No lab exists for component: INREXT, INREXT   No results for input(s): FE, TIBC, PSAT, FERR in the last 72 hours. No results found for: FOL, RBCF   No results for input(s): PH, PCO2, PO2 in the last 72 hours. No results for input(s): CPK, CKMB in the last 72 hours.     No lab exists for component: TROPONINI  No components found for: [place]  Lab Results   Component Value Date/Time    Color DARK YELLOW 11/07/2017 02:53 AM    Appearance CLOUDY 11/07/2017 02:53 AM    Specific gravity 1.016 11/07/2017 02:53 AM    pH (UA) 5.5 11/07/2017 02:53 AM    Protein TRACE 11/07/2017 02:53 AM    Glucose NEGATIVE  11/07/2017 02:53 AM    Ketone NEGATIVE  11/07/2017 02:53 AM    Bilirubin NEGATIVE  05/21/2017 10:35 PM    Urobilinogen 1.0 11/07/2017 02:53 AM    Nitrites NEGATIVE  11/07/2017 02:53 AM    Leukocyte Esterase MODERATE 11/07/2017 02:53 AM    Epithelial cells FEW 11/07/2017 02:53 AM    Bacteria NEGATIVE  11/07/2017 02:53 AM    WBC 5-10 11/07/2017 02:53 AM    RBC 5-10 11/07/2017 02:53 AM       MEDICATIONS:  Current Facility-Administered Medications   Medication Dose Route Frequency    tamsulosin (FLOMAX) capsule 0.4 mg  0.4 mg Oral QHS    insulin glargine (LANTUS) injection 15 Units  15 Units SubCUTAneous DAILY    melatonin tablet 3 mg  3 mg Oral QHS PRN    DOBUTamine (DOBUTREX) 1,000 mg/250 mL (4,000 mcg/mL) infusion  0-10 mcg/kg/min IntraVENous TITRATE    prochlorperazine (COMPAZINE) with saline injection 10 mg  10 mg IntraVENous Q6H PRN    loperamide (IMODIUM) capsule 2 mg  2 mg Oral PRN    apixaban (ELIQUIS) tablet 5 mg  5 mg Oral BID    furosemide (LASIX) 10 mg/ml infusion  10 mg/hr IntraVENous CONTINUOUS    sodium chloride (NS) flush 5-10 mL  5-10 mL IntraVENous Q8H    sodium chloride (NS) flush 5-10 mL  5-10 mL IntraVENous PRN    acetaminophen (TYLENOL) tablet 650 mg  650 mg Oral Q4H PRN    ondansetron (ZOFRAN) injection 4 mg  4 mg IntraVENous Q4H PRN    bisacodyl (DULCOLAX) suppository 10 mg  10 mg Rectal DAILY PRN    amiodarone (CORDARONE) tablet 200 mg  200 mg Oral DAILY    insulin lispro (HUMALOG) injection   SubCUTAneous AC&HS    glucose chewable tablet 16 g  4 Tab Oral PRN    dextrose (D50W) injection syrg 12.5-25 g  12.5-25 g IntraVENous PRN    glucagon (GLUCAGEN) injection 1 mg  1 mg IntraMUSCular PRN

## 2017-11-12 NOTE — PROGRESS NOTES
NAME: Latoya Mcneil. :  1940        MRN:  [id number]        Assessment :    Plan:  --BARBARA  Anasarca  Hyponatremia  DM 2  Hypoalbuminemia  Anemia  Hypotension  Metastatic RCCa - worsened  Acute on chronic systolic HF (EF 72-86%) --Creatinine better (now 1.9 to 1. 6)     hgb 8.9, epo 10 k sc weekly     Albumin 1.8    Sodium low 130's     Weight trending down and edema has improved    On Dobutamine; Lasix gtt (continue on 10 mg/day)      I do not think Mr. Suellen Kaplan is a HD candidate b/c of his very poor EF and metastatic RCC    [place]     He is to go home on hospice. Will continue Lasix gtt for now as he seems more comfortable with diuresis. Subjective:     Chief Complaint:  In bed; family in the room. States SOB and edema a better again. We discussed the above. Review of Systems:    Symptom Y/N Comments  Symptom Y/N Comments   Fever/Chills    Chest Pain n    Poor Appetite y   Edema y    Cough    Abdominal Pain     Sputum    Joint Pain     SOB/WILSON y   Pruritis/Rash     Nausea/vomit n   Tolerating PT/OT     Diarrhea    Tolerating Diet     Constipation    Other       Could not obtain due to:      Objective:     VITALS:   Last 24hrs VS reviewed since prior progress note. Most recent are:  Visit Vitals    BP (!) 96/39 (BP 1 Location: Right arm, BP Patient Position: At rest)    Pulse 83    Temp 97.8 °F (36.6 °C)    Resp 20    Ht 5' 9\" (1.753 m)    Wt 97.7 kg (215 lb 4.8 oz)    SpO2 92%    BMI 31.79 kg/m2       Intake/Output Summary (Last 24 hours) at 17 0835  Last data filed at 17 0654   Gross per 24 hour   Intake              240 ml   Output             2100 ml   Net            -1860 ml      Telemetry Reviewed:     PHYSICAL EXAM:  General: Ill appearing elderly gentleman, nad  Resp:  cta rajan, ant. No access. muscle use  CV:  Regular  rhythm,  No murmur (), No Rubs, No Gallops.  ++ edema (better)  GI:  Soft,  distended, Non tender.  +Bowel sounds, no HSM      Lab Data Reviewed: (see below)    Medications Reviewed: (see below)    PMH/SH reviewed - no change compared to H&P  ________________________________________________________________________  Care Plan discussed with:  Patient y    Family      RN     Care Manager                    Consultant:          Comments   >50% of visit spent in counseling and coordination of care       ________________________________________________________________________  Yanira Chopra MD     Procedures: see electronic medical records for all procedures/Xrays and details which  were not copied into this note but were reviewed prior to creation of Plan. LABS:  Recent Labs      11/11/17   0215  11/10/17   0317   WBC  30.6*  28.9*   HGB  8.9*  8.4*   HCT  28.6*  26.8*   PLT  304  311     Recent Labs      11/12/17 0245  11/11/17   0215  11/10/17   0317   NA  134*  131*  134*   K  3.3*  3.6  3.7   CL  96*  96*  98   CO2  31  24  27   BUN  38*  44*  47*   CREA  1.64*  1.90*  1.88*   GLU  68  142*  76   CA  8.3*  8.0*  8.3*     Recent Labs      11/11/17 0215   SGOT  23   AP  261*   TP  5.5*   ALB  1.8*   GLOB  3.7     No results for input(s): INR, PTP, APTT in the last 72 hours. No lab exists for component: INREXT, INREXT   No results for input(s): FE, TIBC, PSAT, FERR in the last 72 hours. No results found for: FOL, RBCF   No results for input(s): PH, PCO2, PO2 in the last 72 hours. No results for input(s): CPK, CKMB in the last 72 hours.     No lab exists for component: TROPONINI  No components found for: [place]  Lab Results   Component Value Date/Time    Color DARK YELLOW 11/07/2017 02:53 AM    Appearance CLOUDY 11/07/2017 02:53 AM    Specific gravity 1.016 11/07/2017 02:53 AM    pH (UA) 5.5 11/07/2017 02:53 AM    Protein TRACE 11/07/2017 02:53 AM    Glucose NEGATIVE  11/07/2017 02:53 AM    Ketone NEGATIVE  11/07/2017 02:53 AM    Bilirubin NEGATIVE 05/21/2017 10:35 PM    Urobilinogen 1.0 11/07/2017 02:53 AM    Nitrites NEGATIVE  11/07/2017 02:53 AM    Leukocyte Esterase MODERATE 11/07/2017 02:53 AM    Epithelial cells FEW 11/07/2017 02:53 AM    Bacteria NEGATIVE  11/07/2017 02:53 AM    WBC 5-10 11/07/2017 02:53 AM    RBC 5-10 11/07/2017 02:53 AM       MEDICATIONS:  Current Facility-Administered Medications   Medication Dose Route Frequency    insulin glargine (LANTUS) injection 15 Units  15 Units SubCUTAneous DAILY    melatonin tablet 3 mg  3 mg Oral QHS PRN    DOBUTamine (DOBUTREX) 1,000 mg/250 mL (4,000 mcg/mL) infusion  0-10 mcg/kg/min IntraVENous TITRATE    prochlorperazine (COMPAZINE) with saline injection 10 mg  10 mg IntraVENous Q6H PRN    loperamide (IMODIUM) capsule 2 mg  2 mg Oral PRN    apixaban (ELIQUIS) tablet 5 mg  5 mg Oral BID    furosemide (LASIX) 10 mg/ml infusion  10 mg/hr IntraVENous CONTINUOUS    sodium chloride (NS) flush 5-10 mL  5-10 mL IntraVENous Q8H    sodium chloride (NS) flush 5-10 mL  5-10 mL IntraVENous PRN    acetaminophen (TYLENOL) tablet 650 mg  650 mg Oral Q4H PRN    ondansetron (ZOFRAN) injection 4 mg  4 mg IntraVENous Q4H PRN    bisacodyl (DULCOLAX) suppository 10 mg  10 mg Rectal DAILY PRN    amiodarone (CORDARONE) tablet 200 mg  200 mg Oral DAILY    insulin lispro (HUMALOG) injection   SubCUTAneous AC&HS    glucose chewable tablet 16 g  4 Tab Oral PRN    dextrose (D50W) injection syrg 12.5-25 g  12.5-25 g IntraVENous PRN    glucagon (GLUCAGEN) injection 1 mg  1 mg IntraMUSCular PRN

## 2017-11-12 NOTE — PROGRESS NOTES
Hospitalist Progress Note    NAME: Lizbeth Wyman. :  1940   MRN:  [id number]       Assessment / Plan:  Acute on chronic systolic heart failure s/p BIV-ICD  Ischemic cardiomyopathy  Persistent Atrial fibrillation  Hypotension   Hyponatremia, likely due to vol overload, monitor   CKD 4  Oligouric, improved   -diuretic discontinued 2 weeks PTA due to worsening of renal failure  -cxr showed pulmonary edema with small R pleural effusion, trop neg, probnp 30K  -last Echo showed EF15-20%, repeat Echo EF improved to 40-45% while on dobutamine gtt.   -cont' dobutamin gtt while on lasix gtt as above-pt is not a candidate for HD given metastatic CA with poor cardiomyopathy with poor EF  -nephrology/cardiology consultation appreciated  -discussed with pt's sister at bedside regarding home with hospice per pt's wish. I also called CM to confirm needs prior to discharge home with hospice     Metastatic hypernephroma to bone, possibly nodes in the chest   Leukocytosis   -follows by Dr Neil Javier   -appreciate oncology following    T2DM  -SSI, cont' lantus, titrate prn    Anemia  Hx of GI bleed  -no evidence of bleed  -monitor     Met with pt's sister at bedside. Discussed diagnosis and plans of hospice as per pt's wish. Sister was teary, would like to meet with CM regarding transitioning pt to her home for hospice. Code Status: full  Surrogate Decision Maker: pt's brother  DVT Prophylaxis: Eliquis  GI Prophylaxis: not indicated     Subjective:     Chief Complaint / Reason for Physician Visit  Pt seen at bedside, no acute complaints. Sleeping in bed. Discussed with RN events overnight.       Review of Systems:  Symptom Y/N Comments  Symptom Y/N Comments   Fever/Chills n   Chest Pain n    Poor Appetite    Edema y    Cough    Abdominal Pain     Sputum    Joint Pain     SOB/WILSON n   Pruritis/Rash     Nausea/vomit    Tolerating PT/OT     Diarrhea    Tolerating Diet y    Constipation    Other       Could NOT obtain due to:      Objective:     VITALS:   Last 24hrs VS reviewed since prior progress note. Most recent are:  Patient Vitals for the past 24 hrs:   Temp Pulse Resp BP SpO2   11/12/17 0703 97.8 °F (36.6 °C) 83 20 (!) 96/39 92 %   11/12/17 0249 98.2 °F (36.8 °C) 82 20 107/62 91 %   11/12/17 0200 - 85 - (!) 100/37 92 %   11/12/17 0115 - 86 - 119/47 91 %   11/12/17 0100 - 92 - 115/50 94 %   11/12/17 0019 - 85 - 104/53 94 %   11/11/17 2300 - 92 - 117/44 91 %   11/11/17 2246 - 94 20 106/41 91 %   11/11/17 2000 - 91 20 109/57 91 %   11/11/17 1915 98.2 °F (36.8 °C) 87 20 91/55 93 %   11/11/17 1514 98.3 °F (36.8 °C) 80 20 99/47 92 %       Intake/Output Summary (Last 24 hours) at 11/12/17 0941  Last data filed at 11/12/17 0654   Gross per 24 hour   Intake              240 ml   Output             2100 ml   Net            -1860 ml        PHYSICAL EXAM:  General: WD, WN. Alert, cooperative, no acute distress    EENT:  EOMI. Anicteric sclerae. MMM  Resp:  CTA bilaterally, no wheezing or rales. No accessory muscle use  CV:  Regular  rhythm,  +2 b/l LE edema  GI:  Soft, Non distended, Non tender.  +BS  Neurologic:  Alert and oriented X 3, normal speech  Psych:   Good insight. Not anxious nor agitated  Skin:  No rashes. No jaundice    Reviewed most current lab test results and cultures  YES  Reviewed most current radiology test results   YES  Review and summation of old records today    NO  Reviewed patient's current orders and MAR    YES  PMH/SH reviewed - no change compared to H&P  ________________________________________________________________________  Care Plan discussed with:    Comments   Patient x    Family  x sister   RN x    Care Manager x    Consultant                        Multidiciplinary team rounds were held today with , nursing, pharmacist and clinical coordinator. Patient's plan of care was discussed; medications were reviewed and discharge planning was addressed. ________________________________________________________________________  Total NON critical care TIME:  35   Minutes    Total CRITICAL CARE TIME Spent:   Minutes non procedure based      Comments   >50% of visit spent in counseling and coordination of care     ________________________________________________________________________  Huber Brady MD     Procedures: see electronic medical records for all procedures/Xrays and details which were not copied into this note but were reviewed prior to creation of Plan. LABS:  I reviewed today's most current labs and imaging studies.   Pertinent labs include:  Recent Labs      11/11/17   0215  11/10/17   0317   WBC  30.6*  28.9*   HGB  8.9*  8.4*   HCT  28.6*  26.8*   PLT  304  311     Recent Labs      11/12/17   0245  11/11/17   0215  11/10/17   0317   NA  134*  131*  134*   K  3.3*  3.6  3.7   CL  96*  96*  98   CO2  31  24  27   GLU  68  142*  76   BUN  38*  44*  47*   CREA  1.64*  1.90*  1.88*   CA  8.3*  8.0*  8.3*   ALB   --   1.8*   --    TBILI   --   2.6*   --    SGOT   --   23   --    ALT   --   14   --        Signed: Huber Brady MD

## 2017-11-12 NOTE — PROGRESS NOTES
PCU SHIFT NURSING NOTE      Bedside shift change report given to Evelia RN (oncoming nurse) by Dawn Rucker RN (offgoing nurse). Report included the following information SBAR, Kardex, Intake/Output, Recent Results and Cardiac Rhythm paced. Shift Summary:   1915: Bedside report given to night shift nurse. No complaints of pain   1951: Assessment complete. VSS. Pt alert and oriented. Pt turned and repositioned  2341: Call bell answered. Peripheral IV pulled out by patient. Skin tears noted to right hand   0315: Changed noted to previous assessment   0430:BG check of 70. Pt given 25ml of D50  0500: BG recheck [id number]: Pt assisted from bed to recliner chair   0710: Report given to Laura. Call thomas in reach      Admission Date 11/6/2017   Admission Diagnosis Heart failure ([facility] [address]   Consults IP CONSULT TO CARDIOLOGY  IP CONSULT TO HEMATOLOGY  IP CONSULT TO PALLIATIVE CARE - PROVIDER  IP CONSULT TO GASTROENTEROLOGY  IP CONSULT TO UROLOGY  IP CONSULT TO NEPHROLOGY        Consults   []PT   []OT   []Speech   []Case Management      [] Palliative      Cardiac Monitoring Order   []Yes   []No     IV drips   []Yes    Drip:                            Dose:  Drip:                            Dose:  Drip:                            Dose:   []No     GI Prophylaxis   []Yes   []No         DVT Prophylaxis   SCDs:  Sequential Compression Device: Bilateral     Patient Refused VTE Prophylaxis: Yes    Maycol stockings:  Graduated Compression Stockings: Bilateral      [] Medication   []Contraindicated   []None      Activity Level Activity Level: Up with Assistance     Activity Assistance: Partial (two people)   Purposeful Rounding every 1-2 hour?    []Yes   Abernathy Score  Total Score: 3   Bed Alarm (If score 3 or >)   []Yes   [] Refused (See signed refusal form in chart)   Panfilo Score  Panfilo Score: 17   Panfilo Score (if score 14 or less)   []PMT consult   []Wound Care consult      []Specialty bed   [] Nutrition consult          Needs prior to discharge:   Home O2 required:    []Yes   []No    If yes, how much O2 required? Other:    Last Bowel Movement: Last Bowel Movement Date: 11/08/17      Influenza Vaccine Received Flu Vaccine for Current Season (usually Sept-March): Yes        Pneumonia Vaccine           Diet Active Orders   Diet    DIET DIABETIC CONSISTENT CARB Regular; 2 GM NA (House Low NA); FR 1500ML      LDAs               Peripheral IV 11/07/17 Right (Active)   Site Assessment Clean, dry, & intact 11/11/2017  3:56 AM   Phlebitis Assessment 0 11/11/2017  3:56 AM   Infiltration Assessment 0 11/11/2017  3:56 AM   Dressing Status Clean, dry, & intact 11/11/2017  3:56 AM   Dressing Type Disk with Chlorhexadine gluconate (CHG); Tape;Transparent 11/11/2017  3:56 AM   Hub Color/Line Status Pink; Infusing 11/11/2017  3:56 AM   Action Taken Open ports on tubing capped 11/11/2017  3:56 AM   Alcohol Cap Used Yes 11/11/2017  3:56 AM       Peripheral IV 11/10/17 Left; Anterior Forearm (Active)   Site Assessment Clean, dry, & intact 11/11/2017  3:56 AM   Phlebitis Assessment 0 11/11/2017  3:56 AM   Infiltration Assessment 0 11/11/2017  3:56 AM   Dressing Status Clean, dry, & intact 11/11/2017  3:56 AM   Dressing Type Disk with Chlorhexadine gluconate (CHG); Tape;Transparent 11/11/2017  3:56 AM   Hub Color/Line Status Pink; Infusing 11/11/2017  3:56 AM   Action Taken Open ports on tubing capped 11/11/2017  3:56 AM   Alcohol Cap Used Yes 11/11/2017  3:56 AM                      Urinary Catheter Urinary Catheter 11/06/17 Coude-Criteria for Appropriate Use: Medically/surgically unstable    Intake & Output   Date 11/10/17 1900 - 11/11/17 0659 11/11/17 0700 - 11/12/17 0659   Shift 2613-1031 24 Hour Total 7283-2114 0727-4280 24 Hour Total   I  N  T  A  K  E   Shift Total  (mL/kg)        O  U  T  P  U  T   Urine  (mL/kg/hr) 1050 1850 750  (0.5)  750      Urine Voided   750  750      Urine Output (mL) (Urinary Catheter 11/06/17 Coude) 1050 1850       Shift Total  (mL/kg) 1050  (8.9) 1850  (15.7) 750  (6.4)  750  (6.4)   NET -1050 -1850 -750  -750   Weight (kg) 117.9 117.9 117.9 117.9 117.9         Readmission Risk Assessment Tool Score High Risk            26       Total Score        3 Patient Length of Stay (>5 days = 3)    5 Pt. Coverage (Medicare=5 , Medicaid, or Self-Pay=4)    18 Charlson Comorbidity Score (Age + Comorbid Conditions)        Criteria that do not apply:    Has Seen PCP in Last 6 Months (Yes=3, No=0)    . Living with Significant Other. Assisted Living. LTAC. SNF.  or   Rehab    IP Visits Last 12 Months (1-3=4, 4=9, >4=11)       Expected Length of Stay 4d 12h   Actual Length of Stay 5

## 2017-11-12 NOTE — PROGRESS NOTES
URIEL met with the brother Tosha Brewster who is his POA  Who chose [facility]. He and has sister met with the liaison for [facility] and they agreed for discharge tomorrow around noon once DME is in place.    Kisha Leavitt RN #[id number]

## 2017-11-12 NOTE — PROGRESS NOTES
11/12/2017 10:46 AM    Admit Date: 11/6/2017    Admit Diagnosis:   Heart failure (HCC)    Subjective:     Abi Rivas. continues to feel better and diurese well. Current Facility-Administered Medications   Medication Dose Route Frequency    potassium chloride SR (KLOR-CON 10) tablet 40 mEq  40 mEq Oral NOW    insulin glargine (LANTUS) injection 15 Units  15 Units SubCUTAneous DAILY    melatonin tablet 3 mg  3 mg Oral QHS PRN    DOBUTamine (DOBUTREX) 1,000 mg/250 mL (4,000 mcg/mL) infusion  0-10 mcg/kg/min IntraVENous TITRATE    prochlorperazine (COMPAZINE) with saline injection 10 mg  10 mg IntraVENous Q6H PRN    loperamide (IMODIUM) capsule 2 mg  2 mg Oral PRN    apixaban (ELIQUIS) tablet 5 mg  5 mg Oral BID    furosemide (LASIX) 10 mg/ml infusion  10 mg/hr IntraVENous CONTINUOUS    sodium chloride (NS) flush 5-10 mL  5-10 mL IntraVENous Q8H    sodium chloride (NS) flush 5-10 mL  5-10 mL IntraVENous PRN    acetaminophen (TYLENOL) tablet 650 mg  650 mg Oral Q4H PRN    ondansetron (ZOFRAN) injection 4 mg  4 mg IntraVENous Q4H PRN    bisacodyl (DULCOLAX) suppository 10 mg  10 mg Rectal DAILY PRN    amiodarone (CORDARONE) tablet 200 mg  200 mg Oral DAILY    insulin lispro (HUMALOG) injection   SubCUTAneous AC&HS    glucose chewable tablet 16 g  4 Tab Oral PRN    dextrose (D50W) injection syrg 12.5-25 g  12.5-25 g IntraVENous PRN    glucagon (GLUCAGEN) injection 1 mg  1 mg IntraMUSCular PRN         Objective:      Physical Exam:    Visit Vitals    BP (!) 96/39 (BP 1 Location: Right arm, BP Patient Position: At rest)    Pulse 83    Temp 97.8 °F (36.6 °C)    Resp 20    Ht 5' 9\" (1.753 m)    Wt 215 lb 4.8 oz (97.7 kg)    SpO2 92%    BMI 31.79 kg/m2     Gen:  NAD  Mental Status - Alert. General Appearance - Not in acute distress. Chest and Lung Exam   Inspection: Accessory muscles - No use of accessory muscles in breathing.    Auscultation:   Breath sounds: - Normal.   Cardiovascular Inspection: Jugular vein - Bilateral - Inspection Normal.   Palpation/Percussion:   Apical Impulse: - Normal.   Auscultation: Rhythm - Regular. Heart Sounds - S1 WNL and S2 WNL. No S3 or S4. Murmurs & Other Heart Sounds: Auscultation of the heart reveals - No Murmurs. Peripheral Vascular   Upper Extremity: Inspection - Bilateral - No Cyanotic nailbeds or Digital clubbing. Lower Extremity:   Palpation: Edema - Bilateral - No edema. Abdomen:   Soft, non-tender, bowel sounds are active. Neuro: A&O times 3, CN and motor grossly WNL    Data Review:   Recent Labs      11/11/17   0215  11/10/17   0317   WBC  30.6*  28.9*   HGB  8.9*  8.4*   HCT  28.6*  26.8*   PLT  304  311     Recent Labs      11/12/17   0245  11/11/17   0215  11/10/17   0317   NA  134*  131*  134*   K  3.3*  3.6  3.7   CL  96*  96*  98   CO2  31  24  27   GLU  68  142*  76   BUN  38*  44*  47*   CREA  1.64*  1.90*  1.88*   CA  8.3*  8.0*  8.3*   ALB   --   1.8*   --    TBILI   --   2.6*   --    SGOT   --   23   --    ALT   --   14   --        No results for input(s): TROIQ, CPK, CKMB in the last 72 hours.       Intake/Output Summary (Last 24 hours) at 11/12/17 1046  Last data filed at 11/12/17 0654   Gross per 24 hour   Intake              240 ml   Output             2100 ml   Net            -1860 ml        Telemetry: AV paced    Assessment:     Active Problems:    Chronic systolic heart failure ([facility] [address] (4/27/2012)      DM (diabetes mellitus) ([facility] [address] (4/27/2012)      A-fib ([facility] [address] (12/17/2015)      Metastatic cancer ([facility] [address] (11/6/2017)      CKD (chronic kidney disease) (11/6/2017)      Hyponatremia (11/6/2017)        Plan:     Chronic systolic heart failure: presented for worsening peripheral edema after being off diuretics for two weeks.  Admission weight almost 20# above his regular home weight.  With hyponatremia, hypotension, CKD, low albumin on admission.  Repeat ECHO with improved EF, however, patient was on dobutamine during exam.  · Creatinine and diuresis continue to improve, however, remains on high dose dobutamine.   · Hyponatremia mild  · BB and ACI on hold  · About 7L negative on lasix and dobutamine drips with stable creat- continue for now  · Palliative following- goal is comfort and home hospice      A-fib:  Currently AV pacing  · Continue eliquis and amio      BARBARA:  Creatinine improving slightly  · On dobutamine and lasix as above  · Nephrology following:  See that patient is not a  HD candidate

## 2017-11-12 NOTE — PROGRESS NOTES
URIEL called the patient's brother Orville Lobo who is listed as his POA. He stated he will be at the hospital around 12:30 and 1:00pm. URIEL will meet with him regarding order for hospice.  Dwight Pratt RN #[id number]

## 2017-11-13 NOTE — PROGRESS NOTES
Per md patient is being weaned off Dobutamine drip. Transport time changed to 1500pm now. Hospice nurse aware and she will let the brother know. [facility] aware and will come at 1500pm now.

## 2017-11-13 NOTE — PROGRESS NOTES
Renal-pt seen/examined earlier today on rounds. Home with hospice. Need to clarify if pt to be a \"readmission to hospital\" or not. It is my understanding pt wants to keep bernard on dc.   Tatyana Fuller MD

## 2017-11-13 NOTE — PROGRESS NOTES
[address] [place] [address] [address] [facility]  [phone]      Cardiology Progress Note      11/13/2017 1230PM    Admit Date: 11/6/2017    Admit Diagnosis:   Heart failure (HCC)    Subjective:     Abi Rivas. is a 78 y.o. 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 Overnight events:  -VSS  -no new labs   -weight down 2#; neg 1L   -Mr. Carmelo Davies states he's feeling fairly well today. He denies pain. His breathing is feeling steady and he denies palpitations. He wants to go home and hopes everything falls into place.      Visit Vitals    /41 (BP 1 Location: Right arm, BP Patient Position: At rest)    Pulse 81    Temp 98.3 °F (36.8 °C)    Resp 18    Ht 5' 9\" (1.753 m)    Wt 96.9 kg (213 lb 10 oz)    SpO2 99%    BMI 31.55 kg/m2       Current Facility-Administered Medications   Medication Dose Route Frequency    insulin glargine (LANTUS) injection 15 Units  15 Units SubCUTAneous DAILY    melatonin tablet 3 mg  3 mg Oral QHS PRN    DOBUTamine (DOBUTREX) 1,000 mg/250 mL (4,000 mcg/mL) infusion  0-10 mcg/kg/min IntraVENous TITRATE    prochlorperazine (COMPAZINE) with saline injection 10 mg  10 mg IntraVENous Q6H PRN    loperamide (IMODIUM) capsule 2 mg  2 mg Oral PRN    apixaban (ELIQUIS) tablet 5 mg  5 mg Oral BID    sodium chloride (NS) flush 5-10 mL  5-10 mL IntraVENous Q8H    sodium chloride (NS) flush 5-10 mL  5-10 mL IntraVENous PRN    acetaminophen (TYLENOL) tablet 650 mg  650 mg Oral Q4H PRN    ondansetron (ZOFRAN) injection 4 mg  4 mg IntraVENous Q4H PRN    bisacodyl (DULCOLAX) suppository 10 mg  10 mg Rectal DAILY PRN    amiodarone (CORDARONE) tablet 200 mg  200 mg Oral DAILY    insulin lispro (HUMALOG) injection   SubCUTAneous AC&HS    glucose chewable tablet 16 g  4 Tab Oral PRN    dextrose (D50W) injection syrg 12.5-25 g  12.5-25 g IntraVENous PRN    glucagon (GLUCAGEN) injection 1 mg  1 mg IntraMUSCular PRN       Objective:      Physical Exam:  General: pleasant, obese AAM sitting in chair in NAD  Heart: RRR, 3/6 systolic murmur  Lungs: clear/dim   Abdomen: Soft, +BS, NTND   Extremities: LE rajan +DP/PT, 2+ pitting edema to knee;   Neurologic: Grossly normal  Skin:  Warm and dry.     Data Review:   Recent Labs      11/11/17 0215   WBC  30.6*   HGB  8.9*   HCT  28.6*   PLT  304     Recent Labs      11/12/17   0245  11/11/17 0215   NA  134*  131*   K  3.3*  3.6   CL  96*  96*   CO2  31  24   GLU  68  142*   BUN  38*  44*   CREA  1.64*  1.90*   CA  8.3*  8.0*   ALB   --   1.8*   TBILI   --   2.6*   SGOT   --   23   ALT   --   14       No results for input(s): TROIQ, CPK, CKMB in the last 72 hours. Intake/Output Summary (Last 24 hours) at 11/13/17 1309  Last data filed at 11/13/17 1018   Gross per 24 hour   Intake          1191.73 ml   Output             2625 ml   Net         -1433.27 ml        Telemetry: v-pacing, irregular appearing; occ PVC  ECG: v-paced   Echocardiogram: EF 40-45%; mod diffuse hypokinesis; LA dilation; mild MR; mod TR; mod PHTN  CXRAY:small R effusion     Assessment:     Active Problems:    Chronic systolic heart failure ([facility] (4/27/2012)      DM (diabetes mellitus) ([facility] (4/27/2012)      A-fib ([facility] (12/17/2015)      Metastatic cancer ([facility] (11/6/2017)      CKD (chronic kidney disease) (11/6/2017)      Hyponatremia (11/6/2017)        Plan:     Chronic systolic heart failure: presented for worsening peripheral edema after being off diuretics for two weeks. Admission weight almost 20# above his regular home weight. With hyponatremia, hypotension, CKD, low albumin on admission.   Repeat ECHO with improved EF, however, patient was on dobutamine during exam.  · See plans that patient is to discharge home with hospice today OFF of dobutamine  · Discussed with Dr. James Nash - RN to titrate down dobutamine quickly (patient with scheduled pick-up at 1430) and to turn lasix gtt off before titrating down. · Patient will potentially feel very poorly with the abrupt stopping of dobutamine. A-fib:  Currently V pacing and irregular rhythm  · amio continuing at discharge, but hospitalist stopping eliquis for hospice       BARBARA:  Steady improvement  · Being discharged on bumex         Discharging to home hospice today.           Jeremias Raphael NP  DNP, RN, AGACNP-BC

## 2017-11-13 NOTE — PROGRESS NOTES
[facility] responded and they can pick patient up at 1430pm today to transport him home. Patient and his brother aware and in agreement. Malini Yeager from [facility] [phone]  aware of transport time. Nursing informed and given the pcs with pertinent paperwork.

## 2017-11-13 NOTE — PROGRESS NOTES
I have reviewed discharge instructions with the patient. The {patient verbalized understanding. Going home on hospice care. Dobutamine and lasix gtt stopped. PIV discontinued with tip intact. Ambulance here to transport patient to sister's home. All patient belongings transported with patient. Care complete.

## 2017-11-13 NOTE — PROGRESS NOTES
Received call from Libertad sinha with [facility] and explained to her brother is here now and he states no one called him to let him know when the equipment would be delivered. He lives 40 miles away and will need to be at his sister's home to receive the equipment. Brother is here at the hospital at this time. Libertad sinha will call the office and will verify equipment will be there by noon and she will call the brother and myself back. Second medicare im letter given to patient with opportunity for questions and signed copy placed on chart.

## 2017-11-13 NOTE — PROGRESS NOTES
Awaiting call back from At [facility] re--if the equipment has been set up for patient at home.

## 2017-11-13 NOTE — PROGRESS NOTES
Received call from Donta Grover with hospice and she states the equipment should be there by noon. Verified address with her and patient's brother. Patient's brother has gone over to the place of residence so he can let the people in who are delivering the equipment. Referral sent via ecin to [facility] and awaiting their response.

## 2017-11-13 NOTE — PROGRESS NOTES
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  male w pmhs significant for systolic heart failure s/p BIV-ICD, renal CA with mets, present to the ED c/o of worsening of b/l LE edema and about ~20lbs wt gain in the last several days. As per pt, he was recently taken off bumex about 2 weeks ago due to worsening of renal failure. He started noticing wt gain, now with worsening of b/l LE edema. He states he feels sob intermittently, but denies any cp, palpitations, n/v/d. No melena/BRBPR recently. Pt also states he was recently taken off eliquis a few weeks ago. 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 Diuretics discontinued 2 weeks PTA due to worsening of renal failure. CXR showed pulmonary edema with small R pleural effusion, trop neg, probnp 30K,  Llast Echo showed EF15-20%, repeat Echo EF improved to 40-45% while on dobutamine gtt.  Pt was admitted and started on dobutamine gtt with IV lasix. He received 120mg IV lasix the first 12 hrs of admission but remains oliguric. Pt was then started on lasix gtt with bernard insertion. His urine outpt improved, however pt remains hypotensive with sbp ~90 while on dobutamine gtt. Nephrology evaluated. Pt is not a candidate for HD. Given advance malignancy and cardiomyopathy with poor EF, pt and his family opted for home with comfort care. Medications changed to reflex home with hospice. T2DM, resume home medications.   Anemia  Hx of GI bleed    Discharge Exam:  Patient seen and examined by me on discharge day. Pertinent Findings:  Visit Vitals    /41 (BP 1 Location: Right arm, BP Patient Position: At rest)    Pulse 81    Temp 98.3 °F (36.8 °C)    Resp 18    Ht 5' 9\" (1.753 m)    Wt 96.9 kg (213 lb 10 oz)    SpO2 99%    BMI 31.55 kg/m2     Gen:    Not in distress  Chest: Clear lungs  CVS:   Regular rhythm. No edema  Abd:  Soft, not distended, not tender    Discharge/Recent Laboratory Results:  Recent Labs      11/12/17   0245   NA  134*   K  3.3*   CL  96*   CO2  31   BUN  38*   GLU  68   CA  8.3*     Recent Labs      11/11/17   0215   HGB  8.9*   HCT  28.6*   WBC  30.6*   PLT  304       Discharge Medications:  Current Discharge Medication List      CONTINUE these medications which have NOT CHANGED    Details   carvedilol (COREG) 25 mg tablet Take 25 mg by mouth two (2) times daily (with meals). insulin NPH/insulin regular (NOVOLIN 70/30, HUMULIN 70/30) 100 unit/mL (70-30) injection 30 Units by SubCUTAneous route every evening.  Insulin Administration Instructions:    40 units SUBQ daily (morning before breakfast)  30 units SUBQ evening (before dinner)      amiodarone (CORDARONE) 200 mg tablet TAKE ONE TABLET BY MOUTH ONCE DAILY  Qty: 30 Tab, Refills: 6    Associated Diagnoses: Atrial flutter, unspecified type (HCC)      bumetanide (BUMEX) 1 mg tablet Take 1 tab daily, increase to one tab 2x a day if weight up 2lbs or more in 24hrs, 5lbs or more in a week  Qty: 180 Tab, Refills: 3    Associated Diagnoses: Chronic systolic heart failure ([facility] [address]  Atrial fibrillation, unspecified type ([facility] [address]         STOP taking these medications       spironolactone (ALDACTONE) 25 mg tablet Comments:   Reason for Stopping:         amoxicillin (AMOXIL) 500 mg capsule Comments:   Reason for Stopping:         tamsulosin (FLOMAX) 0.4 mg capsule Comments:   Reason for Stopping:         epoetin marianela (EPOGEN;PROCRIT) 10,000 unit/mL injection Comments:   Reason for Stopping:         nivolumab (OPDIVO) 40 mg/4 mL soln chemo injection Comments:   Reason for Stopping:         apixaban (ELIQUIS) 5 mg tablet Comments:   Reason for Stopping:         lisinopril (PRINIVIL, ZESTRIL) 5 mg tablet Comments:   Reason for Stopping:               DISPOSITION:    Home with Family:    Home with HH/PT/OT/RN:    SNF/LTC: x   TREVOR:    OTHER:          Follow up with:   PCP : Homero Louis MD  Follow-up Information     Follow up With Details Comments Contact Info    Homero Louis MD   [facility]  [phone]            Diet: regular    Total time in minutes spent coordinating this discharge (includes going over instructions, follow-up, prescriptions, and preparing report for sign off to her PCP) :  35 minutes

## 2017-11-20 NOTE — DISCHARGE SUMMARY
Discharge Summary      Name: Cathy Harrington  [id number]  YOB: 1940 (Age: 78 y.o.)   Date of Admission: 11/6/2017  Date of Discharge: 11/13/2017  Attending Physician: No att. 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  male w pmhs significant for systolic heart failure s/p BIV-ICD, renal CA with mets, present to the ED c/o of worsening of b/l LE edema and about ~20lbs wt gain in the last several days. As per pt, he was recently taken off bumex about 2 weeks ago due to worsening of renal failure. He started noticing wt gain, now with worsening of b/l LE edema. He states he feels sob intermittently, but denies any cp, palpitations, n/v/d. No melena/BRBPR recently. Pt also states he was recently taken off eliquis a few weeks ago. 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 Diuretics discontinued 2 weeks PTA due to worsening of renal failure. CXR showed pulmonary edema with small R pleural effusion, trop neg, probnp 30K,  Llast Echo showed EF15-20%, repeat Echo EF improved to 40-45% while on dobutamine gtt.  Pt was admitted and started on dobutamine gtt with IV lasix. He received 120mg IV lasix the first 12 hrs of admission but remains oliguric. Pt was then started on lasix gtt with bernard insertion. His urine outpt improved, however pt remains hypotensive with sbp ~90 while on dobutamine gtt. Nephrology evaluated. Pt is not a candidate for HD. Given advance malignancy and cardiomyopathy with poor EF, pt and his family opted for home with comfort care. Medications changed to reflex home with hospice. T2DM, resume home medications.   Anemia  Hx of GI bleed    Discharge Exam:  Patient seen and examined by me on discharge day. Pertinent Findings:  Visit Vitals    /53    Pulse 70    Temp 98 °F (36.7 °C)    Resp 18    Ht 5' 9\" (1.753 m)    Wt 96.9 kg (213 lb 10 oz)    SpO2 99%    BMI 31.55 kg/m2     Gen:    Not in distress  Chest: Clear lungs  CVS:   Regular rhythm. No edema  Abd:  Soft, not distended, not tender    Discharge/Recent Laboratory Results:  No results for input(s): NA, K, CL, CO2, BUN, GLU, CA, PHOS, MG in the last 72 hours. No lab exists for component: CREATININE  No results for input(s): HGB, HCT, WBC, PLT, HGBEXT, HCTEXT, PLTEXT, HGBEXT, HCTEXT, PLTEXT in the last 72 hours. Discharge Medications:  Discharge Medication List as of 11/13/2017  9:14 AM      CONTINUE these medications which have NOT CHANGED    Details   carvedilol (COREG) 25 mg tablet Take 25 mg by mouth two (2) times daily (with meals). , Historical Med      insulin NPH/insulin regular (NOVOLIN 70/30, HUMULIN 70/30) 100 unit/mL (70-30) injection 30 Units by SubCUTAneous route every evening.  Insulin Administration Instructions:    40 units SUBQ daily (morning before breakfast)  30 units SUBQ evening (before dinner), Historical Med      amiodarone (CORDARONE) 200 mg tablet TAKE ONE TABLET BY MOUTH ONCE DAILY, Normal, Disp-30 Tab, R-6      bumetanide (BUMEX) 1 mg tablet Take 1 tab daily, increase to one tab 2x a day if weight up 2lbs or more in 24hrs, 5lbs or more in a week, No Print, Disp-180 Tab, R-3         STOP taking these medications       spironolactone (ALDACTONE) 25 mg tablet Comments:   Reason for Stopping:         amoxicillin (AMOXIL) 500 mg capsule Comments:   Reason for Stopping:         tamsulosin (FLOMAX) 0.4 mg capsule Comments:   Reason for Stopping:         epoetin marianela (EPOGEN;PROCRIT) 10,000 unit/mL injection Comments:   Reason for Stopping:         NIVOLUMAB (OPDIVO IV) Comments:   Reason for Stopping:         nivolumab (OPDIVO) 40 mg/4 mL soln chemo injection Comments:   Reason for Stopping:         apixaban (ELIQUIS) 5 mg tablet Comments:   Reason for Stopping:         lisinopril (PRINIVIL, ZESTRIL) 5 mg tablet Comments:   Reason for Stopping:         spironolactone (ALDACTONE) 25 mg tablet Comments:   Reason for Stopping:               DISPOSITION:    Home with Family:    Home with HH/PT/OT/RN:    SNF/LTC: x   TREVOR:    OTHER:          Follow up with:   PCP : Dena Taylor MD  Follow-up Information     Follow up With Details Comments [address] S[address]iqui MD In 5 weeks  [place] [id number]  [phone]            Diet: regular    Total time in minutes spent coordinating this discharge (includes going over instructions, follow-up, prescriptions, and preparing report for sign off to her PCP) :  35 minutes

## 2017-11-24 ENCOUNTER — APPOINTMENT (OUTPATIENT)
Dept: INFUSION THERAPY | Age: 77
End: 2017-11-24
Payer: MEDICARE

## 2017-12-01 ENCOUNTER — APPOINTMENT (OUTPATIENT)
Dept: INFUSION THERAPY | Age: 77
End: 2017-12-01

## 2017-12-08 ENCOUNTER — APPOINTMENT (OUTPATIENT)
Dept: INFUSION THERAPY | Age: 77
End: 2017-12-08

## 2024-01-30 NOTE — CONSULTS
[address] [place] [address], [address] [facility]     Date of  Admission: 11/6/2017 10:04 AM     Admission type:Emergency    Consult for: HF  Consult by: hospitalist      Subjective:     Karely De Guzman. is a 78 y.o. male with PMH systolic HF with BiV ICD, CAD s/p CABG, DM, a-fib s/p ablation, metastatic hypernephoma to bone who was admitted for Heart failure ([facility] Per ED note, Mr. Viki Rodriguez presented to the ED with c/o worsening extremity swelling and SOB. He states that he was taken off of his diuretic two weeks ago due to worsening kidney function. Mr. Viki Rodriguez states his dry weight at home is 207#. He had been taken off his eliquis due to GIB, but he states he restarted it, but has been without it for the past week while waiting for a refill - he is unclear on if GI restarted his eliquis or not. On assessment, Mr. Viki Rodriguez endorses extremity swelling, SOB, WILSON, orthopnea. He states that his SOB/WILSON/orthopnea have been present for a long time are at his baseline Class III HF symptoms. He denies chest pain, dizziness, palpitations. Mr. Viki Rodriguez follows with Dr. Xena Hammond and Dr. Arvin Morgan for cardiology/EP. Lase ECHO 10/16 with EF 15-20%; severe diffuse hypokinesis; increased wall thickness; moderate MR. Stress test 04/15 without signs of ischemia. Cardiac risk factors: smoking/ tobacco exposure, family history, diabetes mellitus, sedentary life style, male gender, hypertension.       Patient Active Problem List    Diagnosis Date Noted    Heart failure ([address] 11/06/2017    Metastatic cancer ([facility] 11/06/2017    CKD (chronic kidney disease) 11/06/2017    Presence of biventricular automatic cardioverter/defibrillator (AICD) 05/02/2016    S/P ablation of atrial fibrillation 01/08/2016    SOBOE (shortness of breath on exertion) 01/06/2016    A-fib ([facility] 12/17/2015    Atrial flutter ([address] 12/17/2015    WILSON (dyspnea on I called Julita at [phone] and left a voicemail asking her to call me back about her [facility] NIPS results. The test was unable to return a result for the second time, so I am available to speak with the patient about other testing options at her earliest convenience. Callback number of [phone] provided.    Christine Chavez, CGC     exertion) 04/21/2015    [facility] systolic [facility] 04/27/2012    Postsurgical aortocoronary bypass status 04/27/2012    Essential hypertension, benign 04/27/2012    DM (diabetes mellitus) ([facility] 04/27/2012    Mixed hyperlipidemia 04/27/2012    Coronary atherosclerosis of native coronary artery 04/27/2012      Juan C Fierro MD  Past Medical History:   Diagnosis Date    CAD (coronary artery disease)     h/o CABG    Cancer ([facility]     kidney cancer diagnosed Feb 2017    Chronic systolic heart failure (HCC)     Coronary atherosclerosis of native coronary artery     Diabetes ([facility]     Heart failure ([facility]     Hypertension     Other acute and subacute form of ischemic heart disease     Other ill-defined conditions(799.89)     elevated cholesterol    Paroxysmal ventricular tachycardia (HCC)     Presence of biventricular automatic cardioverter/defibrillator (AICD) 5/2/2016 5/2/16 [employer] upgrade to biventricular AICD implant    S/P ablation of atrial fibrillation 1/8/2016      Social History     Social History    Marital status:      Spouse name: N/A    Number of children: N/A    Years of education: N/A     Social History Main Topics    Smoking status: Former Smoker     Quit date: 1/8/2005    Smokeless tobacco: Former User     Quit date: 4/9/2011    Alcohol use No    Drug use: No    Sexual activity: Yes     Partners: Female     Other Topics Concern    None     Social History Narrative     No Known Allergies   Family History   Problem Relation Age of Onset    Heart Disease Mother     Hypertension Mother     Diabetes Mother     Diabetes Father     Heart Disease Father     Hypertension Father       Current Facility-Administered Medications   Medication Dose Route Frequency    sodium chloride (NS) flush 5-10 mL  5-10 mL IntraVENous Q8H    sodium chloride (NS) flush 5-10 mL  5-10 mL IntraVENous PRN    acetaminophen (TYLENOL) tablet 650 mg  650 mg Oral Q4H PRN    ondansetron (ZOFRAN) injection 4 mg  4 mg IntraVENous Q4H PRN    bisacodyl (DULCOLAX) suppository 10 mg  10 mg Rectal DAILY PRN    [START ON 11/7/2017] amiodarone (CORDARONE) tablet 200 mg  200 mg Oral DAILY    atorvastatin (LIPITOR) tablet 20 mg  20 mg Oral QHS    DOBUTamine (DOBUTREX) 500 mg/250 mL (2,000 mcg/mL) infusion  5 mcg/kg/min IntraVENous TITRATE    furosemide (LASIX) injection 40 mg  40 mg IntraVENous BID    insulin lispro (HUMALOG) injection   SubCUTAneous AC&HS    glucose chewable tablet 16 g  4 Tab Oral PRN    dextrose (D50W) injection syrg 12.5-25 g  12.5-25 g IntraVENous PRN    glucagon (GLUCAGEN) injection 1 mg  1 mg IntraMUSCular PRN    heparin (porcine) injection 5,000 Units  5,000 Units SubCUTAneous Q12H    [START ON 11/7/2017] insulin glargine (LANTUS) injection 36 Units  36 Units SubCUTAneous DAILY    insulin lispro (HUMALOG) injection 5 Units  5 Units SubCUTAneous TIDAC        Review of Symptoms:   Constitutional: negative  Eyes: negative   Ears, nose, mouth, throat, and face: negative  Respiratory: SOB, WILSON, orthopnea  Cardiovascular: negative   Gastrointestinal: negative  Genitourinary:negative   Musculoskeletal:extremity swelling   Neurological: negative   Endocrine: negative          Objective:      Visit Vitals    BP (!) 76/31 (BP 1 Location: Left arm, BP Patient Position: At rest)    Pulse 70    Temp 97.5 °F (36.4 °C)    Resp 22    Ht 5' 9\" (1.753 m)    Wt 102.9 kg (226 lb 12.8 oz)    SpO2 95%    BMI 33.49 kg/m2       Physical:   General: pleasant, obese AAM resting in bed in NAD  Heart: RRR, 2/6 systolic murmur  Lungs: clear   Abdomen: Soft, +BS, NTND   Extremities: LE rajan +DP/PT, 3+ pitting edema to thighs BLE; 1+ pitting edema BUE   Neurologic: Grossly normal    Data Review:   Recent Labs      11/06/17   1043   WBC  27.3*   HGB  9.2*   HCT  30.5*   PLT  322     Recent Labs      11/06/17   1043   NA  129*   K  4.3   CL  93*   CO2  28   GLU  358*   BUN  52*   CREA 2.31*   CA  8.9   MG  2.3   ALB  2.2*   TBILI  2.7*   SGOT  20   ALT  12   INR  1.6*       Recent Labs      11/06/17   1043   TROIQ  0.04         Intake/Output Summary (Last 24 hours) at 11/06/17 1515  Last data filed at 11/06/17 1504   Gross per 24 hour   Intake               50 ml   Output                0 ml   Net               50 ml        Cardiographics    Telemetry: not currently on tele (transfering units)   ECG: v-paced   Echocardiogram: last as above; repeat ordered   CXRAY:small R effusion        Assessment:       Active Problems:    [facility] (4/27/2012)      DM (diabetes mellitus) ([facility] (4/27/2012)      A-fib ([facility] (12/17/2015)      Metastatic cancer ([facility] (11/6/2017)      CKD (chronic kidney disease) (11/6/2017)         Plan:     Melanie Mcleod is a 78 y.o. male who presented to the hospital for worsening peripheral edema. Recently off diuretics for two weeks due to kidney dysfunction. Patient's last chemo 2 weeks ago. Dry weight 207# per patient (admission weight 226#). Elevated WBC. Na 129. Creat 2.31. Pro-BNP>52699, trop negative. Albumin 2.2. Patient hypotensive. · Patient's increased peripheral edema multifactorial:  Holding of diuretics, low albumin, and possibly worsened heart function from chemo with his systolic HF. · Patient's hypotension possibly related to low albumin and/or worsened heart function, but would also worry about risk for infection. · Agree with dobutamine and IV diuresis for fluid overload. Na should correct with diuresis  · Amio for a-fib history. Will consult GI to inquire whether patient can safely take eliquis  · Lisinopril and BB on hold for hypotension/BARBARA  · Last lipid check showed very low lipids:  Will add ASA while waiting for GI's comments on eliquis for CAD history. BB as above.     · Diabetes and malignancy per hospitalist and oncology      Thank you for consulting [facility]    Dorothy NP  DNP, RN, AGACNP-BC      Pt seen and examined in details. Agree with NP A&P.  dobutamine and diuretic for now. F/u Echo. Will follow.      Sterling Garcia MD

## 2024-06-10 NOTE — PROGRESS NOTES
Quick check in visit to ask Mr. Linh Maciass if he found the brooklynn in the day. Pt was sitting up in chair with sister, brother, and niece on couch near chair.  acknowledged the love in the room and the good day that pt shared he was having. Pt shared that he did indeed find the brooklynn in the day and gave a chuckle and a smile. Acknowledged the healing power of that love from our families and our larger family. Provided blessing for a peaceful weekend. Family shared gratitude for the visit and wished this  a wonderful Thanksgiving as well as a nice weekend.  will follow as able/needed. For Spiritual Care paging please call [address] [place] [phone]). Ralph Hernandez M.Div.   Palliative  Fellow 15

## (undated) DEVICE — FCPS BX HOT LG 2.2MMX240CM

## (undated) DEVICE — 3M™ TEGADERM™ TRANSPARENT FILM DRESSING FRAME STYLE, 1624W, 2-3/8 IN X 2-3/4 IN (6 CM X 7 CM), 100/CT 4CT/CASE: Brand: 3M™ TEGADERM™

## (undated) DEVICE — SOLIDIFIER MEDC 1200ML -- CONVERT TO 356117

## (undated) DEVICE — ENDO CARRY-ON PROCEDURE KIT INCLUDES ENZYMATIC SPONGE, GAUZE, BIOHAZARD LABEL, TRAY, LUBRICANT, DIRTY SCOPE LABEL, WATER LABEL, TRAY, DRAWSTRING PAD, AND DEFENDO 4-PIECE KIT.: Brand: ENDO CARRY-ON PROCEDURE KIT

## (undated) DEVICE — (D)SYR 10ML 1/5ML GRAD NSAF -- PKGING CHANGE USE ITEM 338027

## (undated) DEVICE — CONTINU-FLO SOLUTION SET, 2 INJECTION SITES, MALE LUER LOCK ADAPTER WITH RETRACTABLE COLLAR, LARGE BORE STOPCOCK WITH ROTATING MALE LUER LOCK EXTENSION SET, 2 INJECTION SITES, MALE LUER LOCK ADAPTER WITH RETRACTABLE COLLAR: Brand: INTERLINK/CONTINU-FLO

## (undated) DEVICE — SOLUTION LACTATED RINGERS INJECTION USP

## (undated) DEVICE — KENDALL DL ECG CABLE AND LEAD WIRE SYSTEM, 3-LEAD, SINGLE PATIENT USE: Brand: KENDALL

## (undated) DEVICE — CLIP INT L235CM WRK CHAN DIA2.8MM OPN 11MM LCK MECHANISM MR

## (undated) DEVICE — 1200 GUARD II KIT W/5MM TUBE W/O VAC TUBE: Brand: GUARDIAN

## (undated) DEVICE — ESOPHAGEAL BALLOON DILATATION CATHETER: Brand: CRE FIXED WIRE

## (undated) DEVICE — SNARE ENDOSCP M L240CM W27MM SHTH DIA2.4MM CHN 2.8MM OVL